# Patient Record
Sex: FEMALE | Race: OTHER | NOT HISPANIC OR LATINO | Employment: FULL TIME | URBAN - METROPOLITAN AREA
[De-identification: names, ages, dates, MRNs, and addresses within clinical notes are randomized per-mention and may not be internally consistent; named-entity substitution may affect disease eponyms.]

---

## 2023-04-20 ENCOUNTER — OFFICE VISIT (OUTPATIENT)
Dept: PRIMARY CARE | Facility: CLINIC | Age: 58
End: 2023-04-20
Payer: COMMERCIAL

## 2023-04-20 VITALS — HEART RATE: 80 BPM | SYSTOLIC BLOOD PRESSURE: 106 MMHG | DIASTOLIC BLOOD PRESSURE: 74 MMHG

## 2023-04-20 DIAGNOSIS — Z98.890 HX OF BREAST REDUCTION, ELECTIVE: ICD-10-CM

## 2023-04-20 DIAGNOSIS — L03.011 PARONYCHIA OF FINGER OF RIGHT HAND: Primary | ICD-10-CM

## 2023-04-20 DIAGNOSIS — L03.011 PARONYCHIA OF FINGER, RIGHT: ICD-10-CM

## 2023-04-20 DIAGNOSIS — K42.9 UMBILICAL HERNIA WITHOUT OBSTRUCTION AND WITHOUT GANGRENE: ICD-10-CM

## 2023-04-20 PROCEDURE — 99213 OFFICE O/P EST LOW 20 MIN: CPT | Performed by: INTERNAL MEDICINE

## 2023-04-20 RX ORDER — UBIDECARENONE 75 MG
500 CAPSULE ORAL DAILY
COMMUNITY
Start: 2017-04-28

## 2023-04-20 RX ORDER — DEXTROMETHORPHAN HYDROBROMIDE, GUAIFENESIN 5; 100 MG/5ML; MG/5ML
650 LIQUID ORAL EVERY 8 HOURS PRN
COMMUNITY
Start: 2015-04-20

## 2023-04-20 RX ORDER — AZELASTINE 1 MG/ML
2 SPRAY, METERED NASAL 2 TIMES DAILY
COMMUNITY
Start: 2020-04-02

## 2023-04-20 RX ORDER — SULFAMETHOXAZOLE AND TRIMETHOPRIM 800; 160 MG/1; MG/1
TABLET ORAL
Qty: 20 TABLET | Refills: 0 | Status: SHIPPED | OUTPATIENT
Start: 2023-04-20 | End: 2023-08-02 | Stop reason: SDUPTHER

## 2023-04-20 RX ORDER — CALCIUM CARB/VITAMIN D3/VIT K1 500-500-40
TABLET,CHEWABLE ORAL
COMMUNITY
Start: 2018-05-07

## 2023-04-20 RX ORDER — PRASTERONE 6.5 MG/1
1 INSERT VAGINAL NIGHTLY
COMMUNITY
Start: 2023-03-06 | End: 2024-03-11 | Stop reason: SDUPTHER

## 2023-04-20 RX ORDER — ESTRADIOL 0.1 MG/G
2 CREAM VAGINAL DAILY
COMMUNITY
Start: 2022-07-01 | End: 2023-04-20 | Stop reason: ALTCHOICE

## 2023-04-20 RX ORDER — FLIBANSERIN 100 MG/1
1 TABLET, FILM COATED ORAL DAILY
COMMUNITY
Start: 2023-04-10 | End: 2024-03-11 | Stop reason: SDUPTHER

## 2023-04-20 NOTE — PROGRESS NOTES
Subjective   Patient ID: Kasey Duarte is a 58 y.o. female who presents for evaluation of green fingernail    HPI   The patient reports a history of constant pain and swelling located over the lateral and medial surfaces of the nailbed of the right second finger since April 17.  She reports that she has noted greenish discoloration over the lateral surface of the nailbed.  She does report an increase in the intensity of pain with any movement of the finger and when pressure is applied to the distal end of the finger.  She reports associated feverishness.  She reports no drainage from the site.  She reports that the aforementioned symptoms developed 2 days after she had a manicure.  Review of Systems    Objective   There were no vitals taken for this visit.    Physical Exam  Skin  Greenish discoloration and swelling is noted over the lateral surface of the nailbed right second finger.  Erythema and swelling are noted over the medial surface of the nailbed of the right second finger and over the palmar surface of the distal end of the finger.  Palpation of the aforementioned regions revealed increased tenderness and a slight increase in warmth  Assessment/Plan        Assessment  Constant pain located over the medial and lateral surfaces of the nailbed of the right second finger with associated constant swelling, constant presence of erythema over the distal end of the palmar surface of the finger as well as the medial surface of the nailbed, greenish discoloration over the lateral surface of the nailbed-probably represents a bacterial paronychia.  Plan  I have urged the patient to soak the finger in warm water 10 minutes at a time 3 times per day.  I also started the patient on Bactrim DS 1 tablet p.o. twice daily x10 days.  The patient will call me in 5 days with her condition or sooner if symptoms worsen or if she is unable to tolerate the Bactrim DS

## 2023-06-14 ENCOUNTER — HOSPITAL ENCOUNTER (OUTPATIENT)
Dept: DATA CONVERSION | Facility: HOSPITAL | Age: 58
End: 2023-06-14
Attending: SURGERY | Admitting: SURGERY
Payer: COMMERCIAL

## 2023-06-14 DIAGNOSIS — R10.33 PERIUMBILICAL PAIN: ICD-10-CM

## 2023-06-14 DIAGNOSIS — D12.5 BENIGN NEOPLASM OF SIGMOID COLON: ICD-10-CM

## 2023-06-14 DIAGNOSIS — Z86.010 PERSONAL HISTORY OF COLONIC POLYPS: ICD-10-CM

## 2023-06-14 DIAGNOSIS — Z12.11 ENCOUNTER FOR SCREENING FOR MALIGNANT NEOPLASM OF COLON: ICD-10-CM

## 2023-06-14 DIAGNOSIS — Z88.0 ALLERGY STATUS TO PENICILLIN: ICD-10-CM

## 2023-06-22 LAB
COMPLETE PATHOLOGY REPORT: NORMAL
CONVERTED CLINICAL DIAGNOSIS-HISTORY: NORMAL
CONVERTED FINAL DIAGNOSIS: NORMAL
CONVERTED FINAL REPORT PDF LINK TO COPY AND PASTE: NORMAL
CONVERTED GROSS DESCRIPTION: NORMAL

## 2023-08-02 ENCOUNTER — TELEPHONE (OUTPATIENT)
Dept: PRIMARY CARE | Facility: CLINIC | Age: 58
End: 2023-08-02

## 2023-08-02 DIAGNOSIS — L03.011 PARONYCHIA OF FINGER, RIGHT: ICD-10-CM

## 2023-08-02 RX ORDER — SULFAMETHOXAZOLE AND TRIMETHOPRIM 800; 160 MG/1; MG/1
TABLET ORAL
Qty: 20 TABLET | Refills: 0 | Status: SHIPPED | OUTPATIENT
Start: 2023-08-02 | End: 2023-10-07 | Stop reason: ALTCHOICE

## 2023-08-02 NOTE — PROGRESS NOTES
Patient called to report a history of urine urinary urgency and urinary frequency over the past few weeks increasing within the past few days.  She reports passage of cloudy strong odored urine since yesterday.  She reports a history of constant low-grade lower back pain since yesterday.  She reports a history of malaise today..  The patient reports no fever, chills, abdominal pain, nausea, vomiting, dysuria, hematuria.  I would wonder whether the patient has a complicated urinary tract infection.  I have prescribed Bactrim DS 1 tablet p.o. twice daily x10 days.  The patient will call me in 72 hours with her condition or sooner if symptoms worsen

## 2023-09-07 VITALS — WEIGHT: 173.28 LBS | BODY MASS INDEX: 32.72 KG/M2 | HEIGHT: 61 IN

## 2023-10-02 ENCOUNTER — APPOINTMENT (OUTPATIENT)
Dept: RADIOLOGY | Facility: CLINIC | Age: 58
End: 2023-10-02
Payer: COMMERCIAL

## 2023-10-03 PROBLEM — H52.13 MYOPIA OF BOTH EYES WITH ASTIGMATISM AND PRESBYOPIA: Status: ACTIVE | Noted: 2023-10-03

## 2023-10-03 PROBLEM — N95.2 VAGINAL ATROPHY: Status: ACTIVE | Noted: 2023-10-03

## 2023-10-03 PROBLEM — R30.0 DYSURIA: Status: ACTIVE | Noted: 2023-10-03

## 2023-10-03 PROBLEM — N12 PYELONEPHRITIS: Status: ACTIVE | Noted: 2023-10-03

## 2023-10-03 PROBLEM — J34.89 RHINORRHEA: Status: ACTIVE | Noted: 2023-10-03

## 2023-10-03 PROBLEM — F52.0 HYPOACTIVE SEXUAL DESIRE DISORDER: Status: ACTIVE | Noted: 2023-10-03

## 2023-10-03 PROBLEM — K46.9 ABDOMINAL HERNIA: Status: ACTIVE | Noted: 2023-10-03

## 2023-10-03 PROBLEM — H61.23 BILATERAL IMPACTED CERUMEN: Status: ACTIVE | Noted: 2023-10-03

## 2023-10-03 PROBLEM — F52.31 FEMALE ORGASMIC DISORDER: Status: ACTIVE | Noted: 2023-10-03

## 2023-10-03 PROBLEM — J34.3 NASAL TURBINATE HYPERTROPHY: Status: ACTIVE | Noted: 2023-10-03

## 2023-10-03 PROBLEM — H52.4 MYOPIA OF BOTH EYES WITH ASTIGMATISM AND PRESBYOPIA: Status: ACTIVE | Noted: 2023-10-03

## 2023-10-03 PROBLEM — E55.9 VITAMIN D DEFICIENCY: Status: ACTIVE | Noted: 2023-10-03

## 2023-10-03 PROBLEM — H52.203 MYOPIA OF BOTH EYES WITH ASTIGMATISM AND PRESBYOPIA: Status: ACTIVE | Noted: 2023-10-03

## 2023-10-03 PROBLEM — Z90.711 HISTORY OF HYSTERECTOMY, SUPRACERVICAL: Status: ACTIVE | Noted: 2023-10-03

## 2023-10-03 PROBLEM — J31.0 CHRONIC RHINITIS: Status: ACTIVE | Noted: 2023-10-03

## 2023-10-03 PROBLEM — R68.82 DECREASED SEX DRIVE: Status: ACTIVE | Noted: 2023-10-03

## 2023-10-03 RX ORDER — CITRULL/ARGIN/PINE XT/ROSE HIP 400-400 MG
1 TABLET ORAL DAILY
COMMUNITY
Start: 2022-09-29

## 2023-10-03 RX ORDER — BIOTIN 5 MG
TABLET ORAL
COMMUNITY
Start: 2023-08-31

## 2023-10-03 RX ORDER — CALCIUM/MAGNESIUM/ZINC 333-133-5
1 TABLET ORAL DAILY
COMMUNITY
Start: 2022-09-29

## 2023-10-03 RX ORDER — METRONIDAZOLE 7.5 MG/G
1 GEL VAGINAL NIGHTLY
COMMUNITY
Start: 2022-06-29 | End: 2024-02-23 | Stop reason: ALTCHOICE

## 2023-10-04 ENCOUNTER — HOSPITAL ENCOUNTER (OUTPATIENT)
Dept: RADIOLOGY | Facility: HOSPITAL | Age: 58
Discharge: HOME | End: 2023-10-04
Payer: COMMERCIAL

## 2023-10-04 DIAGNOSIS — Z90.711 ACQUIRED ABSENCE OF UTERUS WITH REMAINING CERVICAL STUMP: ICD-10-CM

## 2023-10-04 PROCEDURE — 76830 TRANSVAGINAL US NON-OB: CPT

## 2023-10-05 ENCOUNTER — APPOINTMENT (OUTPATIENT)
Dept: PHYSICAL THERAPY | Facility: CLINIC | Age: 58
End: 2023-10-05
Payer: COMMERCIAL

## 2023-10-05 ENCOUNTER — OFFICE VISIT (OUTPATIENT)
Dept: OBSTETRICS AND GYNECOLOGY | Facility: CLINIC | Age: 58
End: 2023-10-05
Payer: COMMERCIAL

## 2023-10-05 VITALS
BODY MASS INDEX: 35.08 KG/M2 | DIASTOLIC BLOOD PRESSURE: 68 MMHG | HEART RATE: 88 BPM | SYSTOLIC BLOOD PRESSURE: 106 MMHG | WEIGHT: 179.6 LBS

## 2023-10-05 DIAGNOSIS — N95.0 POST-MENOPAUSAL BLEEDING: ICD-10-CM

## 2023-10-05 DIAGNOSIS — Z90.711 STATUS POST ABDOMINAL SUPRACERVICAL SUBTOTAL HYSTERECTOMY: ICD-10-CM

## 2023-10-05 DIAGNOSIS — N89.8 VAGINAL DISCHARGE: ICD-10-CM

## 2023-10-05 DIAGNOSIS — R30.0 DYSURIA: Primary | ICD-10-CM

## 2023-10-05 PROCEDURE — 87186 SC STD MICRODIL/AGAR DIL: CPT | Performed by: STUDENT IN AN ORGANIZED HEALTH CARE EDUCATION/TRAINING PROGRAM

## 2023-10-05 PROCEDURE — 99212 OFFICE O/P EST SF 10 MIN: CPT | Performed by: STUDENT IN AN ORGANIZED HEALTH CARE EDUCATION/TRAINING PROGRAM

## 2023-10-05 ASSESSMENT — PAIN SCALES - GENERAL: PAINLEVEL: 0-NO PAIN

## 2023-10-05 NOTE — PROGRESS NOTES
Division of Minimally Invasive Gynecologic Surgery  [facility]    10/05/23 Gynecology Visit    CC: Watery discharge, light pink spotting      Kasey Duarte is a 58 y.o.  who presents in follow up for watery discharge w/ intermittent light pink vaginal bleeding.     Discharge has come back, but is not as productive as it was previously. She does occasionally have light pink vaginal spotting. Though less heavy, it is very bothersome to her as sometimes it goes through her underwear and onto her clothes.     She still strongly wishes to prefer surgery. Continues to deny pelvic/abdominal pain.     Also reports UTI x 2 in the past 6 months and thinks she may currently have another.     Recent pelvic US did not show any evidence of cervical mass/fluid collection.     From last note:   57 yo s/p supracervical hyst w/ PMB and intermittent but persistent watery discharge presents in referral from Yenny Lau to discuss management options.     She reports both bleeding and discharge are intermittent and typical light, but bleeding can sometimes be like a light period. She is currently using vaginal estrogen which seems to have improved bleeding for the past 3 weeks. She reports discharge improved after antibiotic treatment for UTI, but is beginning to return. Minimal currently and no vagina irritation, pain, or itching.     She would prefer to avoid further surgery if possible, but is very bothered by these symptoms. She is concerned about sexual function w/ removal of cervix.     Last pap: 2/2023 cytology negative/HRHPV + (other), denies hx of abnormal pap; no GC/CT/trich on pap   Mammogram up to date   Colnosocopy 2023, f/u in 5 years   PMHx: none   PSHx: ?open supracervical hyst w/ [place] 2013, lap gastric sleeve 2015, umbilical hernia repair w/ mesh    PMHx, PSHx, SHx, Allergies, and Medications updated in Epic.    ROS: reviewed and negative    PE:/68   Pulse 88   Wt 81.5 kg  (179 lb 9.6 oz)   BMI 35.08 kg/m²   Constitutional:  No acute distress, well-nourished and well-developed  HEENT: EOM grossly intact, MMM, neck supple and with full ROM  Pulm:  Effort normal. No accessory muscle usage.  No respiratory distress.  Neurological:  She is alert and oriented to person place and time.  Skin: Warm, no pallor.  Psychiatric:  She has normal mood and affect.    A/P:    Problem List Items Addressed This Visit          Genitourinary and Reproductive    Dysuria - Primary    Relevant Orders    Urine Culture     Other Visit Diagnoses       Post-menopausal bleeding        Vaginal discharge        Status post abdominal supracervical subtotal hysterectomy              Kasey Duarte is a 58 y.o.  who presents in follow up for watery discharge w/ intermittent light pink vaginal bleeding.     Plan:  - Plan for repeat testing of GC/CT/trich. Negative on pap 2/2023, but given persistence and now bleeding, re-testing is reasonable.  - Trial of boric acid suppositories   - UTI: UA in office today negative, will send for culture    Yanira Machuca MD  Division of Minimally Invasive Gynecologic Surgery  [facility]

## 2023-10-06 ENCOUNTER — TELEPHONE (OUTPATIENT)
Dept: OBSTETRICS AND GYNECOLOGY | Facility: CLINIC | Age: 58
End: 2023-10-06
Payer: COMMERCIAL

## 2023-10-06 ENCOUNTER — LAB (OUTPATIENT)
Dept: LAB | Facility: LAB | Age: 58
End: 2023-10-06
Payer: COMMERCIAL

## 2023-10-06 DIAGNOSIS — N93.9 VAGINAL BLEEDING: ICD-10-CM

## 2023-10-06 DIAGNOSIS — N93.9 VAGINAL BLEEDING: Primary | ICD-10-CM

## 2023-10-06 PROCEDURE — 87800 DETECT AGNT MULT DNA DIREC: CPT

## 2023-10-06 PROCEDURE — 87661 TRICHOMONAS VAGINALIS AMPLIF: CPT

## 2023-10-06 NOTE — TELEPHONE ENCOUNTER
Called the patient and let her know that she needs to give a urine sample at any  lab for GC/CT and trich. Verbalized understanding.

## 2023-10-06 NOTE — TELEPHONE ENCOUNTER
Please let Kasey know that Dr. Machuca had me order GC/CT and Trich. She can do it at any  lab. Yenny Lau, APRN-CNP

## 2023-10-07 DIAGNOSIS — N30.00 ACUTE CYSTITIS WITHOUT HEMATURIA: Primary | ICD-10-CM

## 2023-10-07 LAB
C TRACH RRNA SPEC QL NAA+PROBE: NEGATIVE
N GONORRHOEA DNA SPEC QL PROBE+SIG AMP: NEGATIVE
T VAGINALIS RRNA SPEC QL NAA+PROBE: NEGATIVE

## 2023-10-07 RX ORDER — SULFAMETHOXAZOLE AND TRIMETHOPRIM 800; 160 MG/1; MG/1
1 TABLET ORAL 2 TIMES DAILY
Qty: 6 TABLET | Refills: 0 | Status: SHIPPED | OUTPATIENT
Start: 2023-10-07 | End: 2023-10-10

## 2023-10-08 LAB — BACTERIA UR CULT: ABNORMAL

## 2023-10-12 ENCOUNTER — LAB (OUTPATIENT)
Dept: LAB | Facility: LAB | Age: 58
End: 2023-10-12
Payer: COMMERCIAL

## 2023-10-12 ENCOUNTER — TREATMENT (OUTPATIENT)
Dept: PHYSICAL THERAPY | Facility: CLINIC | Age: 58
End: 2023-10-12
Payer: COMMERCIAL

## 2023-10-12 DIAGNOSIS — R30.0 DYSURIA: ICD-10-CM

## 2023-10-12 DIAGNOSIS — M54.9 BACK PAIN, UNSPECIFIED BACK LOCATION, UNSPECIFIED BACK PAIN LATERALITY, UNSPECIFIED CHRONICITY: Primary | ICD-10-CM

## 2023-10-12 DIAGNOSIS — R30.0 DYSURIA: Primary | ICD-10-CM

## 2023-10-12 LAB
APPEARANCE UR: ABNORMAL
BACTERIA #/AREA URNS AUTO: ABNORMAL /HPF
BILIRUB UR STRIP.AUTO-MCNC: NEGATIVE MG/DL
COLOR UR: YELLOW
GLUCOSE UR STRIP.AUTO-MCNC: NEGATIVE MG/DL
KETONES UR STRIP.AUTO-MCNC: NEGATIVE MG/DL
LEUKOCYTE ESTERASE UR QL STRIP.AUTO: ABNORMAL
MUCOUS THREADS #/AREA URNS AUTO: ABNORMAL /LPF
NITRITE UR QL STRIP.AUTO: NEGATIVE
PH UR STRIP.AUTO: 6 [PH]
PROT UR STRIP.AUTO-MCNC: NEGATIVE MG/DL
RBC # UR STRIP.AUTO: ABNORMAL /UL
RBC #/AREA URNS AUTO: ABNORMAL /HPF
SP GR UR STRIP.AUTO: 1.02
SQUAMOUS #/AREA URNS AUTO: ABNORMAL /HPF
UROBILINOGEN UR STRIP.AUTO-MCNC: <2 MG/DL
WBC #/AREA URNS AUTO: ABNORMAL /HPF

## 2023-10-12 PROCEDURE — 87086 URINE CULTURE/COLONY COUNT: CPT

## 2023-10-12 PROCEDURE — 81001 URINALYSIS AUTO W/SCOPE: CPT

## 2023-10-12 PROCEDURE — 97110 THERAPEUTIC EXERCISES: CPT | Mod: GP | Performed by: PHYSICAL MEDICINE & REHABILITATION

## 2023-10-12 ASSESSMENT — PAIN - FUNCTIONAL ASSESSMENT: PAIN_FUNCTIONAL_ASSESSMENT: 0-10

## 2023-10-12 ASSESSMENT — PAIN SCALES - GENERAL: PAINLEVEL_OUTOF10: 6

## 2023-10-12 NOTE — PROGRESS NOTES
"  Physical Therapy Treatment    Patient Name: Kasey Duarte  MRN: [id number]  Today's Date: 10/12/2023  Time Calculation  Start Time: 1523  Stop Time: 1605  Time Calculation (min): 42 min  Current Problem  1. Back pain, unspecified back location, unspecified back pain laterality, unspecified chronicity            Insurance:  Visit number: 8 of 60      Subjective   General  General Comment: Patient reports having slightly higher pain levels this visit. She attributes this to the weather outside.      Precautions  Precautions  Precautions Comment: None  Pain  Pain Assessment: 0-10  Pain Score: 6    Objective   Treatments:  Therapeutic Exercise  Therapeutic Exercise Activity 1: SciFit: L3x5'  Therapeutic Exercise Activity 2: Supine Shoulder flexion: x20 w dowel  Therapeutic Exercise Activity 3: Open Book Stretch: 10x10\" holds each side  Therapeutic Exercise Activity 4: Seated thoracic Ext: x20 against 1/2 foam roll  Therapeutic Exercise Activity 5: Pec Stretch: x1' low-mid  Therapeutic Exercise Activity 6: Lumbar rotations: x20 each side w legs on red ball  Therapeutic Exercise Activity 7: DKTC: x20 w legs on red ball  Therapeutic Exercise Activity 8: Seated Row: x20 w 10# at Matrix  Therapeutic Exercise Activity 9: Shoulder Ext: x20 w 10# at Matrix    Assessment:   Today's session focused on incorporating thoracic mobility activities to address patient's symptoms. Patient reported a slight increased in pain at the conclusion of the session. She was instructed to rest for the remainder of the day and monitor her response until her next visit.     Plan:   Continue with current POC. Progress strengthening, mobility and stability as tolerated.    Jomar Christianson, PT  "

## 2023-10-13 ENCOUNTER — TELEPHONE (OUTPATIENT)
Dept: OBSTETRICS AND GYNECOLOGY | Facility: CLINIC | Age: 58
End: 2023-10-13
Payer: COMMERCIAL

## 2023-10-13 LAB — BACTERIA UR CULT: NORMAL

## 2023-10-19 ENCOUNTER — TREATMENT (OUTPATIENT)
Dept: PHYSICAL THERAPY | Facility: CLINIC | Age: 58
End: 2023-10-19
Payer: COMMERCIAL

## 2023-10-19 DIAGNOSIS — M54.9 BACK PAIN, UNSPECIFIED BACK LOCATION, UNSPECIFIED BACK PAIN LATERALITY, UNSPECIFIED CHRONICITY: Primary | ICD-10-CM

## 2023-10-19 PROCEDURE — 97110 THERAPEUTIC EXERCISES: CPT | Mod: GP,CQ

## 2023-10-19 ASSESSMENT — PAIN SCALES - GENERAL: PAINLEVEL_OUTOF10: 4

## 2023-10-19 ASSESSMENT — PAIN - FUNCTIONAL ASSESSMENT: PAIN_FUNCTIONAL_ASSESSMENT: 0-10

## 2023-10-19 NOTE — PROGRESS NOTES
"  Physical Therapy Treatment    Patient Name: Kasey Duarte  MRN: [id number]  Today's Date: 10/19/2023  Time Calculation  Start Time: 1657  Stop Time: 1732  Time Calculation (min): 35 min  Current Problem  1. Back pain, unspecified back location, unspecified back pain laterality, unspecified chronicity            Insurance:  Number of Treatments Authorized: 9/60          Subjective   General  General Comment: PT STATES SHE IS DOING OK.  SHE KNOWS IT'S GOING TO RAIN.  NEW HEP IS GOING WELL. PT LATE    Performing HEP?: Yes    Precautions  Precautions  Precautions Comment: None  Pain  Pain Assessment: 0-10  Pain Score: 4    Objective   FWD POSTURE          Treatments:    Therapeutic Exercise  Therapeutic Exercise Activity 1: [place] L2 X 5 MIN  Therapeutic Exercise Activity 2: GASTROC STRETCH X 1 MIN  Therapeutic Exercise Activity 3: DYNAMICS  BUTT KICK, MARCH  Therapeutic Exercise Activity 4: Seated thoracic Ext: x 2 MIN against 1/2 foam roll  Therapeutic Exercise Activity 5: Pec Stretch: x1' low-mid  Therapeutic Exercise Activity 6: Lumbar rotations: x20 each side w legs on red ball  Therapeutic Exercise Activity 7: DKTC: x20 w legs on red ball  Therapeutic Exercise Activity 8: LIGHT BAND SH EXT AND ROWS X 1 MIN EACH  Therapeutic Exercise Activity 9: Open Book Stretch: HOLD 10\" X 1 MIN each side         Manual Therapy  Manual Therapy Performed: Yes  Manual Therapy Activity 1: SEATED LEANING ON TABLE GREEN ROLLER STM C-T-L PARA, RHOM                   Assessment:  PT Assessment  Assessment Comment: PT BETI EX'S WELL.  SHE IS PROGRESSING WITH HER FLEXIBILITY IN HER SPINE AND SHOULDERS. SHE WAS CHALLENGED WITH HER BALANCE DURING DYNAMICS. PT FELT BETTER AFTER SESSION.    Plan:  OP PT Plan  PT Plan: Skilled PT (Continue with current POC. Progress strengthening, mobility and stability as tolerated.)  Number of Treatments Authorized: 9/60    Goals:       Alexandre Toledo PTA  "

## 2023-10-30 ENCOUNTER — APPOINTMENT (OUTPATIENT)
Dept: PHYSICAL THERAPY | Facility: CLINIC | Age: 58
End: 2023-10-30
Payer: COMMERCIAL

## 2023-11-27 ENCOUNTER — APPOINTMENT (OUTPATIENT)
Dept: PLASTIC SURGERY | Facility: CLINIC | Age: 58
End: 2023-11-27
Payer: COMMERCIAL

## 2023-12-11 ENCOUNTER — OFFICE VISIT (OUTPATIENT)
Dept: PLASTIC SURGERY | Facility: CLINIC | Age: 58
End: 2023-12-11
Payer: COMMERCIAL

## 2023-12-11 ENCOUNTER — LAB (OUTPATIENT)
Dept: LAB | Facility: LAB | Age: 58
End: 2023-12-11
Payer: COMMERCIAL

## 2023-12-11 VITALS
HEIGHT: 60 IN | DIASTOLIC BLOOD PRESSURE: 66 MMHG | SYSTOLIC BLOOD PRESSURE: 98 MMHG | BODY MASS INDEX: 35.14 KG/M2 | HEART RATE: 59 BPM | WEIGHT: 179 LBS

## 2023-12-11 DIAGNOSIS — N30.00 ACUTE CYSTITIS WITHOUT HEMATURIA: Primary | ICD-10-CM

## 2023-12-11 DIAGNOSIS — N30.00 ACUTE CYSTITIS WITHOUT HEMATURIA: ICD-10-CM

## 2023-12-11 DIAGNOSIS — M54.9 BACK PAIN, UNSPECIFIED BACK LOCATION, UNSPECIFIED BACK PAIN LATERALITY, UNSPECIFIED CHRONICITY: Primary | ICD-10-CM

## 2023-12-11 PROCEDURE — 81001 URINALYSIS AUTO W/SCOPE: CPT

## 2023-12-11 PROCEDURE — 87086 URINE CULTURE/COLONY COUNT: CPT

## 2023-12-11 PROCEDURE — 99214 OFFICE O/P EST MOD 30 MIN: CPT | Performed by: PHYSICIAN ASSISTANT

## 2023-12-11 NOTE — PROGRESS NOTES
Department of Plastic and Reconstructive Surgery            Follow Up Visit    Date: 12/11/23    Subjective   Kasey Duarte is a 58 y.o. female who presents for follow up visit regarding mid to upper back pain secondary to macromastia.     She presents after the completion of physical therapy. She endorsed minimal improvement in her symptoms with physical therapy. She states that she is still having daily pain. She was last seen by Dr. Gonzalez on 7/31/23. She has a history of gastric sleeve bariatric surgery and endorsed that since her weight loss she noticed increased size and drooping of her breasts. She endorses bra strap grooving. She endorses having a difficult time finding bras that fit her due to the increased size of her breasts. She was being treated by her PCP with steroids for the rashes she was having under her breast folds. She presents now to discuss possible breast reduction after minimal improvement with physical therapy.     Objective   Vital Signs:   Vitals:    12/11/23 1422   BP: 98/66   Pulse: 59     Gen: interactive and pleasant  Head: NCAT  Eyes: EOMI, PERRLA  Mouth: MMM  Throat: trachea midline  Cor: RRR  Pulm: nonlabored breathing  Abd: s/nt/nd  Neuro: AAOx3  Ext: extremities perfused    Focused exam of the:                                 R                  L  SN:NAC             29cm              30cm  NAC:IMF            10cm             11cm         BW                    14cm              14cm        NAC diam         5cm              5cm                                                                                                                                  Ptosis Grade     3               3                                                       Bra Size         36DDD       Assessment/Plan     Kasey Duarte is a 58 y.o. female who presents for follow up visit regarding mid to upper back pain secondary to macromastia.     Today we discussed moving forward with  surgical intervention for continued symptomatic macromastia. I educated the patient on the Schnur scale as a use of measurement to determine to amount of breast tissue to take based on body surface area. Her BSA is 1.85. The schnur scale places her at the required amount per side of 482g.     We performed Vectra analysis of this patient and found to have estimated breast volumes of 613g right breast and 543g left breast. I discussed with her that she would likely not be a good candidate for breast reduction surgery due to the volumes of the breast. I advised that due to having to remove 482g from the left breast she would be left overall very small and this would likely cause cosmetic dissatisfaction. I advised that due to the ptotic nature of the breast from weight loss surgery mastopexy may help her to achieve the aesthetic result she is looking for while reducing the strain on the shoulders and back by reducing the ptosis of the breast.  She would like to consider this, our office will provide a quote to her for mastopexy. She additionally noted a history of keloid scarring, I advised that she will be susceptible to keloid scars with any surgical scar.     Plan:   Mastopexy quote  Patient to contact our office with her decision on how she would like to proceed.   3D vectra today    I spent 30 minutes with this patient. Greater than 50% of this time was spent in the counselling and/or coordination of care of this patient.  This note was created using voice recognition software and was not corrected for typographical or grammatical errors.    Signature: Raquel Guido PA-C  Date: 12/11/23

## 2023-12-12 DIAGNOSIS — R30.0 DYSURIA: Primary | ICD-10-CM

## 2023-12-12 LAB
APPEARANCE UR: ABNORMAL
BACTERIA #/AREA URNS AUTO: ABNORMAL /HPF
BACTERIA UR CULT: NORMAL
BILIRUB UR STRIP.AUTO-MCNC: NEGATIVE MG/DL
COLOR UR: YELLOW
GLUCOSE UR STRIP.AUTO-MCNC: NEGATIVE MG/DL
HOLD SPECIMEN: NORMAL
KETONES UR STRIP.AUTO-MCNC: NEGATIVE MG/DL
LEUKOCYTE ESTERASE UR QL STRIP.AUTO: NEGATIVE
NITRITE UR QL STRIP.AUTO: NEGATIVE
PH UR STRIP.AUTO: 5 [PH]
PROT UR STRIP.AUTO-MCNC: NEGATIVE MG/DL
RBC # UR STRIP.AUTO: ABNORMAL /UL
RBC #/AREA URNS AUTO: >20 /HPF
SP GR UR STRIP.AUTO: 1.01
SQUAMOUS #/AREA URNS AUTO: ABNORMAL /HPF
UROBILINOGEN UR STRIP.AUTO-MCNC: <2 MG/DL
WBC #/AREA URNS AUTO: >50 /HPF

## 2023-12-12 RX ORDER — NITROFURANTOIN 25; 75 MG/1; MG/1
100 CAPSULE ORAL 2 TIMES DAILY
Qty: 10 CAPSULE | Refills: 0 | Status: SHIPPED | OUTPATIENT
Start: 2023-12-12 | End: 2023-12-17

## 2024-01-17 DIAGNOSIS — R30.0 DYSURIA: Primary | ICD-10-CM

## 2024-02-06 ENCOUNTER — OFFICE VISIT (OUTPATIENT)
Dept: OBSTETRICS AND GYNECOLOGY | Facility: CLINIC | Age: 59
End: 2024-02-06
Payer: COMMERCIAL

## 2024-02-06 VITALS
BODY MASS INDEX: 35.5 KG/M2 | HEART RATE: 74 BPM | SYSTOLIC BLOOD PRESSURE: 130 MMHG | DIASTOLIC BLOOD PRESSURE: 84 MMHG | HEIGHT: 61 IN | WEIGHT: 188 LBS

## 2024-02-06 DIAGNOSIS — N89.8 VAGINAL ODOR: ICD-10-CM

## 2024-02-06 DIAGNOSIS — T83.712A: Primary | ICD-10-CM

## 2024-02-06 DIAGNOSIS — R30.0 DYSURIA: ICD-10-CM

## 2024-02-06 PROCEDURE — 1036F TOBACCO NON-USER: CPT | Performed by: NURSE PRACTITIONER

## 2024-02-06 PROCEDURE — 99213 OFFICE O/P EST LOW 20 MIN: CPT | Performed by: NURSE PRACTITIONER

## 2024-02-06 RX ORDER — METRONIDAZOLE 500 MG/1
500 TABLET ORAL
Qty: 14 TABLET | Refills: 0 | Status: SHIPPED | OUTPATIENT
Start: 2024-02-06 | End: 2024-02-13

## 2024-02-06 ASSESSMENT — PATIENT HEALTH QUESTIONNAIRE - PHQ9
1. LITTLE INTEREST OR PLEASURE IN DOING THINGS: NOT AT ALL
2. FEELING DOWN, DEPRESSED OR HOPELESS: NOT AT ALL
SUM OF ALL RESPONSES TO PHQ9 QUESTIONS 1 AND 2: 0

## 2024-02-06 ASSESSMENT — PAIN SCALES - GENERAL: PAINLEVEL: 0-NO PAIN

## 2024-02-06 NOTE — PROGRESS NOTES
[facility] Department of Urogynecology   QUINTON Sanchez  [phone]    ASSESSMENT AND PLAN:   58 y.o. female being assessed for sling erosion and related symptoms including foul smelling vaginal discharge and vaginal bleeding.     Diagnoses:   #1 Sling erosion  #2 Vaginal bleeding from sling erosion     Plan:   1. Sling erosion, vaginal odor, vaginal bleeding  - S/p combined surgical procedure in May 2013, including a supracervical hysterectomy and a mid-urethral sling for stress incontinence with Dr. Tanner and Dr. Lei.   - On exam, palpation of the anterior vaginal wall revealed left sling arm erosion through the vaginal rugae but was unable to visualize sling erosion because of the blood even though a cotton swab was used to remove the blood.   - She is having a foul smelling, bloody discharge due to the sling erosion.   - We reviewed the first line treatment for sling erosion is vaginal estrogen cream. For now, she will continue with Intrarosa which we discussed is DHEA. She has been on Estradiol before for atrophy, but discontinued and only uses Intrarosa now as she felt this worked better for her.   - To rule out mesh erosion into the bladder, a cystoscopy can also be done. If she did have mesh in the bladder, it can cause Nina or urinary leakage.   - She will be treated with Flagyl for her vaginal odor until she can be seen with Dr. Tucker.      Follow-up with Dr. Tucker for cystoscopy and further discussion of sling erosion.     Scribe Attestation:   INevaeh, am scribing for virtually, and in the presence of QUINTON Sanchez on 2/6/24 at 4:16 PM.   IZoila APRN-CNP, personally performed the services described in this documentation which was scribed virtually and I confirm that it is both accurate and complete.       Problem List Items Addressed This Visit          Genitourinary and Reproductive    Dysuria    Relevant Orders    Measure post void  "residual     Other Visit Diagnoses       Vaginal odor    -  Primary    Relevant Medications    metroNIDAZOLE (FlagyL) 500 mg tablet              [facility]    HISTORY OF PRESENT ILLNESS:     Kasey Duarte is a 58 y.o. female who presents for dysuria. Dr. Machuca is her GYN.     Record Review:   - On 12/11/23, another urine cx was sent because UA micro had >20 RBC/HPF, but culture was no growth.   - On 10/12/23, she had a no growth urine cx but had moderate blood on UA. Of note, RBC/HPF were 6-10 on UA micro.   - 10/4/23 Pelvic ultrasound: Uterus and ovaries are not visualized. No clear evidence for mass or fluid collection.   - 6/10/23 CT abdomen pelvis: Approximate 3.1 cm cyst in the lower pole of the left kidney. 4 mm left renal stone redemonstrated. No hydronephrosis or hydroureter. The bladder wall is normal thickness for degree of distention.  - S/p sleeve gastrectomy.   - S/p combined surgical procedure in May 2013, including a supracervical hysterectomy and a mid-urethral sling for stress incontinence with Dr. Tanner and Dr. Lei.      Sling History:    - In 2019, she began having light-colored, watery discharge which has progressively worsened to the point of requiring a full pad change midday and has developed an \"old urine smell\" over the last four to six months.    - Since 2016, she has been having Nina.   - Most recently she noticed the sxs stop if she is put on an antibiotic x10 days. The bleeding temporarily stops but then comes back gradually.    - Never had a cystoscopy.     OBGYN Hx:  - She has been using Intrarosa for vaginal atrophy. Previously used Estradiol, but she felt like Intrarosa worked better. Uses Intrarosa nightly.     Sexual Activity:   - She is sexually active and bleeds during intercourse. Her  did say he felt something in the vagina. Her new partner has not felt anything in the vagina.   - Uses lubricant during intercourse.     Past Medical History:   "     Past Medical History:   Diagnosis Date    Arthropathy, unspecified 2018    Arthropathy    Hypoactive sexual desire disorder 2020    Hypoactive sexual desire disorder    Postmenopausal atrophic vaginitis 2020    Vaginal atrophy    Status post abdominal supracervical subtotal hysterectomy           Past Surgical History:     - sleeve gastrectomy in 2015, supracervical hysterectomy and a mid-urethral sling for stress incontinence with Dr. Tanner and Dr. Lei in , umbilical hernia repair with mesh     Past Surgical History:   Procedure Laterality Date     SECTION, CLASSIC  2014     Section    HYSTERECTOMY  2014    Open supracervical hyst w/ Dr. Tanner     OTHER SURGICAL HISTORY  2020    Gastric bypass surgery    OTHER SURGICAL HISTORY  2014    Arthrotomy Of Knee    OTHER SURGICAL HISTORY  2014    Laparoscopic Sling Operation For Stress Incontinence    OTHER SURGICAL HISTORY  2014    Foot Surgery Left    OTHER SURGICAL HISTORY  2014    Ovarian Cystectomy Right    TONSILLECTOMY  2014    Tonsillectomy    UMBILICAL HERNIA REPAIR  2014    with mesh         Medications:       Prior to Admission medications    Medication Sig Start Date End Date Taking? Authorizing Provider   acetaminophen (Tylenol Arthritis Pain) 650 mg ER tablet Take 1 tablet (650 mg) by mouth every 8 hours if needed. 4/20/15  Yes Historical Provider, MD   Addyi 100 mg tablet 1 tablet once daily. 4/10/23  Yes Historical Provider, MD   azelastine (Astelin) 137 mcg (0.1 %) nasal spray Administer 2 sprays into affected nostril(s) twice a day. 20  Yes Historical Provider, MD   biotin 5 mg tablet Take by mouth. 23  Yes Historical Provider, MD   calcium-magnesium-zinc 333-133-5 mg tablet Take 1 tablet by mouth once daily. 22  Yes Historical Provider, MD   cyanocobalamin (Vitamin B-12) 500 mcg tablet Take 1 tablet (500 mcg) by mouth once daily. 17   "Yes Historical Provider, MD   Intrarosa 6.5 mg insert Insert 1 Application into the vagina once daily at bedtime. 3/6/23  Yes Historical Provider, MD   multivit-min-ferrous fumarate (Multi Vitamin) 9 mg iron/15 mL liquid Take by mouth. 5/7/18  Yes Historical Provider, MD   citrull-argin-pine xt-hellen hip (Ristela) 472-435- mg tablet Take 1 tablet by mouth once daily. 9/29/22   Historical Provider, MD   metroNIDAZOLE (FlagyL) 500 mg tablet Take 1 tablet (500 mg) by mouth 2 times a day with meals for 7 days. 2/6/24 2/13/24  Zoila Maloney, APRN-CNP   metroNIDAZOLE (Metrogel) 0.75 % (37.5mg/5 gram) vaginal gel Insert 1 Application into the vagina once daily at bedtime. 6/29/22   Historical Provider, MD HERNANDEZ  Review of Systems       PHYSICAL EXAM:      /84   Pulse 74   Ht 1.549 m (5' 1\")   Wt 85.3 kg (188 lb)   BMI 35.52 kg/m²      No LMP recorded.      Well developed, well nourished, in no apparent distress.   Neurologic/Psychiatric:  Awake, Alert and Oriented times 3.  Affect normal.     GENITAL/URINARY:       External Genitalia:  The patient has normal appearing external genitalia, normal skenes and bartholins glands, and a normal hair distribution.  Her vulva is without lesions, erythema or discharge.  It is non-tender with appropriate sensation.     Urethral Meatus:  Size normal, Location normal, Lesions absent, Prolapse absent,      Urethra:  Fullness absent, Masses absent,      Vagina:  General appearance normal, palpation of the anterior vaginal wall revealed left sling arm erosion through the vaginal rugae, unable to visualize sling erosion because of the blood even though a cotton swab was used to remove the blood    Cervix: Normal, no discharge.   Uterus:  surgically absent    Normal Perineum    "

## 2024-02-22 ENCOUNTER — APPOINTMENT (OUTPATIENT)
Dept: OBSTETRICS AND GYNECOLOGY | Facility: CLINIC | Age: 59
End: 2024-02-22
Payer: COMMERCIAL

## 2024-02-22 ENCOUNTER — PREP FOR PROCEDURE (OUTPATIENT)
Dept: OBSTETRICS AND GYNECOLOGY | Facility: CLINIC | Age: 59
End: 2024-02-22

## 2024-02-22 ENCOUNTER — PROCEDURE VISIT (OUTPATIENT)
Dept: OBSTETRICS AND GYNECOLOGY | Facility: CLINIC | Age: 59
End: 2024-02-22
Payer: COMMERCIAL

## 2024-02-22 VITALS — DIASTOLIC BLOOD PRESSURE: 66 MMHG | SYSTOLIC BLOOD PRESSURE: 125 MMHG | WEIGHT: 183 LBS | BODY MASS INDEX: 34.58 KG/M2

## 2024-02-22 DIAGNOSIS — N89.8 VAGINAL DISCHARGE: Primary | ICD-10-CM

## 2024-02-22 DIAGNOSIS — Z01.818 PREOP TESTING: ICD-10-CM

## 2024-02-22 DIAGNOSIS — Z98.890 HISTORY OF SUBURETHRAL SLING PROCEDURE: ICD-10-CM

## 2024-02-22 DIAGNOSIS — T83.712A: ICD-10-CM

## 2024-02-22 DIAGNOSIS — Z98.890 HISTORY OF CYSTOSCOPY: ICD-10-CM

## 2024-02-22 DIAGNOSIS — T83.711S: Primary | ICD-10-CM

## 2024-02-22 LAB
POC APPEARANCE, URINE: CLEAR
POC BILIRUBIN, URINE: NEGATIVE
POC BLOOD, URINE: ABNORMAL
POC COLOR, URINE: YELLOW
POC GLUCOSE, URINE: NEGATIVE MG/DL
POC KETONES, URINE: NEGATIVE MG/DL
POC LEUKOCYTES, URINE: NEGATIVE
POC NITRITE,URINE: NEGATIVE
POC PH, URINE: 6.5 PH
POC PROTEIN, URINE: NEGATIVE MG/DL
POC SPECIFIC GRAVITY, URINE: 1.01
POC UROBILINOGEN, URINE: 0.2 EU/DL

## 2024-02-22 PROCEDURE — 99214 OFFICE O/P EST MOD 30 MIN: CPT | Mod: 95,25 | Performed by: OBSTETRICS & GYNECOLOGY

## 2024-02-22 PROCEDURE — 99214 OFFICE O/P EST MOD 30 MIN: CPT | Performed by: OBSTETRICS & GYNECOLOGY

## 2024-02-22 PROCEDURE — 81003 URINALYSIS AUTO W/O SCOPE: CPT | Mod: QW | Performed by: OBSTETRICS & GYNECOLOGY

## 2024-02-22 PROCEDURE — 52000 CYSTOURETHROSCOPY: CPT | Performed by: OBSTETRICS & GYNECOLOGY

## 2024-02-22 PROCEDURE — 2500000005 HC RX 250 GENERAL PHARMACY W/O HCPCS: Performed by: OBSTETRICS & GYNECOLOGY

## 2024-02-22 PROCEDURE — 2500000002 HC RX 250 W HCPCS SELF ADMINISTERED DRUGS (ALT 637 FOR MEDICARE OP, ALT 636 FOR OP/ED): Performed by: OBSTETRICS & GYNECOLOGY

## 2024-02-22 RX ORDER — SODIUM CHLORIDE, SODIUM LACTATE, POTASSIUM CHLORIDE, CALCIUM CHLORIDE 600; 310; 30; 20 MG/100ML; MG/100ML; MG/100ML; MG/100ML
100 INJECTION, SOLUTION INTRAVENOUS CONTINUOUS
Status: CANCELLED | OUTPATIENT
Start: 2024-02-22

## 2024-02-22 RX ORDER — PHENAZOPYRIDINE HYDROCHLORIDE 200 MG/1
200 TABLET, FILM COATED ORAL ONCE
Status: CANCELLED | OUTPATIENT
Start: 2024-02-22 | End: 2024-02-22

## 2024-02-22 RX ADMIN — NITROFURANTOIN (MONOHYDRATE/MACROCRYSTALS) 100 MG: 75; 25 CAPSULE ORAL at 15:00

## 2024-02-22 RX ADMIN — LIDOCAINE HYDROCHLORIDE 1 APPLICATION: 20 JELLY TOPICAL at 15:05

## 2024-02-22 ASSESSMENT — ENCOUNTER SYMPTOMS
CONSTITUTIONAL NEGATIVE: 1
MUSCULOSKELETAL NEGATIVE: 1
PSYCHIATRIC NEGATIVE: 1
ENDOCRINE NEGATIVE: 1
EYES NEGATIVE: 1
RESPIRATORY NEGATIVE: 1
CARDIOVASCULAR NEGATIVE: 1
NEUROLOGICAL NEGATIVE: 1
GASTROINTESTINAL NEGATIVE: 1

## 2024-02-22 ASSESSMENT — PAIN SCALES - GENERAL: PAINLEVEL: 0-NO PAIN

## 2024-02-22 NOTE — PROGRESS NOTES
[facility] Department of Urogynecology   Megan Tucker MD, MPH   [phone]    ASSESSMENT AND PLAN:   58 year old female with erosion of suburethral sling that was placed in 2013 for DOMINICK, dyspareunia, and vaginal bleeding.     Diagnoses:  #1 Erosion of suburethral sling  #2 Vaginal bleeding  #3 Dyspareunia     Plan:  1. Erosion of suburethral sling, DOMINICK  - Hx of midurethral sling in 2013 with Dr. Lei and now endorsing VB with malodorous discharge and dyspareunia.   - Upon exam today the midurethral sling mesh erosion visualized with blue mesh noted on the left side anterior vaginal wall, mesh erosion is around 1cm in size. No mesh erosion on the right side of the anterior wall from the suburethral sling.   - We discussed the risks of the cystoscopy including UTI, dysuria, and hematuria. If she feels symptomatic of a UTI we advised her to call our office. There is a very small risk of injury to the bladder or urethra due to the cystoscope. We counseled that is a risk that there could be abnormal cells that are not visible to the naked eye that we are unable to detect by cystoscopy today. If we find something abnormal or suspicious we may take a biopsy of the mass or fluid and we would refer her to urology. The patient provided verbal understanding and signed her consent prior to the procedure today.   - Cystoscopy today demonstrated a normal bladder lining and mucosa, normal bladder neck upon retroflexion, normal urethra, and bilateral orthotopic ureters. No evidence of bladder polyps, stones, or masses were visualized.   - There is no evidence of mesh erosion from the sling on the bladder which we discussed is reassuring.     Mesh Erosion Management:  - We discussed treatment options moving forward such as vaginal mesh excision and we counseled her that the larger the piece of mesh we remove the higher the chances of her DOMINICK symptoms returning. Therefore we offered her L sided (site of eroded mesh)  mesh excision vs. entire mesh excision to eliminate the chance of mesh erosion from occurring again in the future although her DOMINICK sxs would likely return with this option however if DOMINICK returns she may consider Bulkamid injection. We reviewed the risks of surgery including a 30% risk of POUR for which she would wear a Claudio catheter for 24 hours until she passes her voiding trial the following day. She is amenable to removing a larger portion of the midurethral sling mesh that has eroded (bilaterally instead of just the L side).   - Will need PAT and does not require PCP clearance.   - Case request sent for excision suburethral sling excision and cystoscopy with Dr. Megan Tucker. Will plan on excising the vaginal portion of the sling bilaterally- She understands DOMINICK can return and that she may need additional treatment for this in the future.     2. Dyspareunia  - If she continues to endorse dyspareunia after sling excision we recommend going to PFPT for relaxation exercises.     Follow up for excision of MUS    Scribe Attestation   By signing my name below, I, Allegra Chowibluis antonio, attest that this documentation has been prepared under the direction and in the presence of Megan Tucker MD MPH on 02/22/2024 at 6:20 PM.     Problem List Items Addressed This Visit    None  Visit Diagnoses       Erosion of suburethral sling, initial encounter (CMS/[facility])        Relevant Orders    POCT UA Automated manually resulted (Completed)           I spent a total of 30 minutes in face to face and non face to face time.      Megan Tucker MD, MPH, FACOG     I Dr. Tucker, personally performed the services described in the documentation as scribed in my presence and confirm it is both complete and accurate.  Megan Tucker MD, MPH, FACOG    Established    HISTORY OF PRESENT ILLNESS:   58 year old female presenting for cystoscopy to work up possible suburethral sling erosion.     Record Review:   - 2/6/2024 Zoila Maloney  APRN-CNP note reviewed: TVT sling erosion with vaginal bleeding - Hx of TVT placed in  by Dr. Lei. The sling erosion was identified on pelvic exam with palpation located on the anterior wall on the left side but was unable to visualize erosion due to vaginal bleeding. She was endorsing a foul smelling vaginal discharge and started tx with Flagyl. Referred to Dr. Tucker for a cystoscopy to evaluate sling erosion for consideration of mesh excision.      Vaginal Symptoms:   - The patient reports having vaginal bleeding secondary to her midurethral sling that was placed in  for DOMINICK. She is amenable to getting a cystoscopy today to evaluate any further erosion from the sling.   - Her VB is characterized by a light flow in comparison to 2 weeks ago; she now only wears one pad per day.   - Endorses dyspareunia that has been ongoing for the past 2 years.   - She previously tried using tv estrogen cream to help the vaginal tissue heal over the mesh erosion with failure.   - Denies DOMINICK with coughing, laughing, and sneezing.   - Endorses occasional urinary urgency but largely denies UUI episodes from occurring.     OBGYN History:   - , x3  and x1   - Weight of largest baby: 9#  - Hx of OASI and pelvic fx during delivery of her baby that weighed 9 pounds.        Past Medical History:     Past Medical History:   Diagnosis Date    Arthropathy, unspecified 2018    Arthropathy    Hypoactive sexual desire disorder 2020    Hypoactive sexual desire disorder    Postmenopausal atrophic vaginitis 2020    Vaginal atrophy    Status post abdominal supracervical subtotal hysterectomy        Past Surgical History:    Past Surgical History:   Procedure Laterality Date     SECTION, CLASSIC  2014     Section    HYSTERECTOMY  2014    Open supracervical hyst w/ Dr. Tanner     OTHER SURGICAL HISTORY  2020    Gastric bypass surgery    OTHER SURGICAL HISTORY   06/17/2014    Arthrotomy Of Knee    OTHER SURGICAL HISTORY  06/17/2014    Laparoscopic Sling Operation For Stress Incontinence    OTHER SURGICAL HISTORY  06/17/2014    Foot Surgery Left    OTHER SURGICAL HISTORY  06/17/2014    Ovarian Cystectomy Right    TONSILLECTOMY  06/17/2014    Tonsillectomy    UMBILICAL HERNIA REPAIR  06/17/2014    with mesh       Medications:     Prior to Admission medications    Medication Sig Start Date End Date Taking? Authorizing Provider   acetaminophen (Tylenol Arthritis Pain) 650 mg ER tablet Take 1 tablet (650 mg) by mouth every 8 hours if needed. 4/20/15   Historical Provider, MD   Addyi 100 mg tablet 1 tablet once daily. 4/10/23   Historical Provider, MD   azelastine (Astelin) 137 mcg (0.1 %) nasal spray Administer 2 sprays into affected nostril(s) twice a day. 4/2/20   Historical Provider, MD   biotin 5 mg tablet Take by mouth. 8/31/23   Historical Provider, MD   calcium-magnesium-zinc 333-133-5 mg tablet Take 1 tablet by mouth once daily. 9/29/22   Historical Provider, MD   citrull-argin-pine xt-hellen hip (Ristela) 423-095- mg tablet Take 1 tablet by mouth once daily. 9/29/22   Historical Provider, MD   cyanocobalamin (Vitamin B-12) 500 mcg tablet Take 1 tablet (500 mcg) by mouth once daily. 4/28/17   Historical Provider, MD   Intrarosa 6.5 mg insert Insert 1 Application into the vagina once daily at bedtime. 3/6/23   Historical Provider, MD   metroNIDAZOLE (Metrogel) 0.75 % (37.5mg/5 gram) vaginal gel Insert 1 Application into the vagina once daily at bedtime. 6/29/22   Historical Provider, MD   multivit-min-ferrous fumarate (Multi Vitamin) 9 mg iron/15 mL liquid Take by mouth. 5/7/18   Historical Provider, MD        ROS  Review of Systems   Constitutional: Negative.    HENT: Negative.     Eyes: Negative.    Respiratory: Negative.     Cardiovascular: Negative.    Gastrointestinal: Negative.    Endocrine: Negative.    Genitourinary:  Positive for dyspareunia, vaginal bleeding  and vaginal discharge.   Musculoskeletal: Negative.    Neurological: Negative.    Psychiatric/Behavioral: Negative.            PHYSICAL EXAM:    /66   Wt 83 kg (183 lb)   BMI 34.58 kg/m²   No LMP recorded. Patient has had a hysterectomy.    Declines chaperone for physical exam.      Well developed, well nourished, in no apparent distress.   Neurologic/Psychiatric:  Awake, Alert and Oriented times 3.  Affect normal.     GENITAL/URINARY:     External Genitalia:  The patient has normal appearing external genitalia, normal skenes and bartholins glands, and a normal hair distribution.  Her vulva is without lesions, erythema or discharge.  It is non-tender with appropriate sensation.     Urethral Meatus:  Size normal, Location normal, Lesions absent, Prolapse absent,      Urethra:  Fullness absent, Masses absent.    Bladder:  Fullness absent, Masses absent, Tenderness absent.    Vagina:  General appearance normal, Discharge absent, Lesions absent. Hypoestrogenic vaginal epithelium. Midurethral sling mesh erosion visualized with blue mesh noted on the left side anterior vaginal wall, mesh erosion is around 1cm in size. No mesh erosion on the right side from the suburethral sling.     PROCEDURE:   Patient presents for Cystoscopy due to  midurethral sling erosion . UA results were reviewed and showed moderate blood.     Risks, benefits, and alternative were discussed in detail.  Patient appears to understand and agrees to proceed.  Patient has signed the procedure consent form.    Cystoscopy findings:  Introitus: unremarkable introitus finding  Urethra: unremarkable urethra finding  Bladder: unremarkable bladder finding, no mesh erosion from the sling    Normal urethral and bladder epithelium. Orthotopic ureters with bilateral efflux seen. No stones, tumors, lesions or evidence of injury seen.       Good hemostasis was obtained and the bladder was drained.       Data and DIAGNOSTIC STUDIES REVIEWED   Lab Results    Component Value Date    URINECULTURE No significant growth 12/11/2023      Lab Results   Component Value Date    GLUCOSE 91 09/29/2022    CALCIUM 9.2 09/29/2022     09/29/2022    K 4.0 09/29/2022    CO2 29 09/29/2022     09/29/2022    BUN 12 09/29/2022    CREATININE 0.96 09/29/2022     Lab Results   Component Value Date    WBC 5.4 09/29/2022    HGB 13.6 09/29/2022    HCT 42.2 09/29/2022    MCV 84 09/29/2022     09/29/2022

## 2024-02-23 ENCOUNTER — TELEPHONE (OUTPATIENT)
Dept: OBSTETRICS AND GYNECOLOGY | Facility: CLINIC | Age: 59
End: 2024-02-23
Payer: COMMERCIAL

## 2024-02-23 PROBLEM — T83.711A: Status: ACTIVE | Noted: 2024-02-22

## 2024-02-23 RX ORDER — METRONIDAZOLE 7.5 MG/G
GEL VAGINAL 2 TIMES DAILY
Qty: 70 G | Refills: 0 | Status: SHIPPED | OUTPATIENT
Start: 2024-02-23 | End: 2024-02-28

## 2024-02-23 RX ORDER — NITROFURANTOIN 25; 75 MG/1; MG/1
100 CAPSULE ORAL ONCE
Status: COMPLETED | OUTPATIENT
Start: 2024-02-23 | End: 2024-02-22

## 2024-02-23 RX ORDER — LIDOCAINE HYDROCHLORIDE 20 MG/ML
1 JELLY TOPICAL ONCE
Status: COMPLETED | OUTPATIENT
Start: 2024-02-23 | End: 2024-02-22

## 2024-02-23 NOTE — TELEPHONE ENCOUNTER
I spoke with Dr Duarte in regard to scheduling her surgery with Dr Tucker for revision or removal of sling. She is requesting procedure at [facility], despite the fact that I can get her in other locations much sooner. I explained that the first availability there will be April 17, 2024. She is adament about her surgery be there. Request sent to [facility]. She wants to be put on waiting list, if a sooner date becomes available.

## 2024-02-26 ENCOUNTER — PATIENT MESSAGE (OUTPATIENT)
Dept: OBSTETRICS AND GYNECOLOGY | Facility: CLINIC | Age: 59
End: 2024-02-26
Payer: COMMERCIAL

## 2024-02-26 DIAGNOSIS — T83.711S: Primary | ICD-10-CM

## 2024-02-26 DIAGNOSIS — N95.2 VAGINAL ATROPHY: ICD-10-CM

## 2024-02-29 ENCOUNTER — TELEPHONE (OUTPATIENT)
Dept: OBSTETRICS AND GYNECOLOGY | Facility: CLINIC | Age: 59
End: 2024-02-29
Payer: COMMERCIAL

## 2024-02-29 NOTE — TELEPHONE ENCOUNTER
Call to patient to let her know that the date of 3/20/2024 has become available if she would like to move her surgery to that date. She agrees.

## 2024-03-11 DIAGNOSIS — F52.0 HYPOACTIVE SEXUAL DESIRE DISORDER: Primary | ICD-10-CM

## 2024-03-11 RX ORDER — METRONIDAZOLE 500 MG/1
500 TABLET ORAL 2 TIMES DAILY
Qty: 14 TABLET | Refills: 0 | Status: SHIPPED | OUTPATIENT
Start: 2024-03-11 | End: 2024-03-20

## 2024-03-11 RX ORDER — PRASTERONE 6.5 MG/1
1 INSERT VAGINAL NIGHTLY
Qty: 28 EACH | Refills: 3 | Status: SHIPPED | OUTPATIENT
Start: 2024-03-11 | End: 2024-05-02 | Stop reason: SDUPTHER

## 2024-03-13 RX ORDER — FLIBANSERIN 100 MG/1
1 TABLET, FILM COATED ORAL DAILY
Qty: 30 TABLET | Refills: 11 | Status: SHIPPED | OUTPATIENT
Start: 2024-03-13

## 2024-03-19 ENCOUNTER — ANESTHESIA EVENT (OUTPATIENT)
Dept: OPERATING ROOM | Facility: HOSPITAL | Age: 59
End: 2024-03-19
Payer: COMMERCIAL

## 2024-03-19 NOTE — H&P
Preoperative History and Physical    60 yo with erosion of suburethral sling with vaginal bleeding and dyspareunia presenting for excision of suburethral sling and cystoscopy,     Pap hx: NILM hpv other + 2023,   Pre-op CBC/BMP pending     Obhx: , x3  and x1 , hx OASIS  Gynhx: supracervical hyst, last pap nilm hpv other + 2023    Surghx:   umbilical hernia repair with mesh,  section,  abdominal supracervical hyst,  R ovarian cystectomy, gastric bypass , knee arthrotomy , left foot surgery, tonsillectomy,  mid-urethral sling   All: PCN (anaphylaxis), NSAIDS (hives), Propoxyphene N-acetaminophen      Past Medical History  Past Medical History:   Diagnosis Date    Arthropathy, unspecified 2018    Arthropathy    Hypoactive sexual desire disorder 2020    Hypoactive sexual desire disorder    Postmenopausal atrophic vaginitis 2020    Vaginal atrophy    Status post abdominal supracervical subtotal hysterectomy         Past Surgical History   Past Surgical History:   Procedure Laterality Date     SECTION, CLASSIC  2014     Section    HYSTERECTOMY  2014    Open supracervical hyst w/ Dr. Tanner     OTHER SURGICAL HISTORY  2020    Gastric bypass surgery    OTHER SURGICAL HISTORY  2014    Arthrotomy Of Knee    OTHER SURGICAL HISTORY  2014    Laparoscopic Sling Operation For Stress Incontinence    OTHER SURGICAL HISTORY  2014    Foot Surgery Left    OTHER SURGICAL HISTORY  2014    Ovarian Cystectomy Right    TONSILLECTOMY  2014    Tonsillectomy    UMBILICAL HERNIA REPAIR  2014    with mesh       Social History  Social History     Tobacco Use    Smoking status: Never    Smokeless tobacco: Never   Substance Use Topics    Alcohol use: Not on file     Substance and Sexual Activity   Drug Use Not on file       Allergies  Penicillins, Nsaids (non-steroidal anti-inflammatory drug), and  Propoxyphene n-acetaminophen     Medications  No medications prior to admission.       Objective    Last Vitals  Temp Pulse Resp BP MAP O2 Sat                   Physical Examination  Gen: NAD  HEENT: NCAT  Resp: normal work of breathing on room air  Card: regular rate  Neuro: grossly intact   Psych: approipriate  Motor: moving all extremities spontaneously   Abdomen: Non distended      Lab Review  Labs in chart were reviewed.    A/P  58 yo with erosion of suburethral sling place 2013 presenting for scheduled excision     Excision of suburethral sling  - plan for surgery as scheduled, with cystoscopy     Dw Dr Caitlin Steele MD  OB/GYN PGY3

## 2024-03-20 ENCOUNTER — HOSPITAL ENCOUNTER (OUTPATIENT)
Facility: HOSPITAL | Age: 59
Setting detail: OUTPATIENT SURGERY
Discharge: HOME | End: 2024-03-20
Attending: OBSTETRICS & GYNECOLOGY | Admitting: OBSTETRICS & GYNECOLOGY
Payer: COMMERCIAL

## 2024-03-20 ENCOUNTER — ANESTHESIA (OUTPATIENT)
Dept: OPERATING ROOM | Facility: HOSPITAL | Age: 59
End: 2024-03-20
Payer: COMMERCIAL

## 2024-03-20 VITALS
RESPIRATION RATE: 15 BRPM | TEMPERATURE: 97.7 F | OXYGEN SATURATION: 94 % | BODY MASS INDEX: 35.05 KG/M2 | HEIGHT: 61 IN | DIASTOLIC BLOOD PRESSURE: 68 MMHG | WEIGHT: 185.63 LBS | SYSTOLIC BLOOD PRESSURE: 113 MMHG | HEART RATE: 81 BPM

## 2024-03-20 DIAGNOSIS — T83.711S: Primary | ICD-10-CM

## 2024-03-20 PROBLEM — G47.33 OSA (OBSTRUCTIVE SLEEP APNEA): Status: ACTIVE | Noted: 2024-03-20

## 2024-03-20 PROCEDURE — A57287 PR REMV/REVS SLING FOR STRES INCONTINENCE: Performed by: NURSE ANESTHETIST, CERTIFIED REGISTERED

## 2024-03-20 PROCEDURE — A4216 STERILE WATER/SALINE, 10 ML: HCPCS | Performed by: OBSTETRICS & GYNECOLOGY

## 2024-03-20 PROCEDURE — 2500000001 HC RX 250 WO HCPCS SELF ADMINISTERED DRUGS (ALT 637 FOR MEDICARE OP): Performed by: OBSTETRICS & GYNECOLOGY

## 2024-03-20 PROCEDURE — 3600000003 HC OR TIME - INITIAL BASE CHARGE - PROCEDURE LEVEL THREE: Performed by: OBSTETRICS & GYNECOLOGY

## 2024-03-20 PROCEDURE — 3700000001 HC GENERAL ANESTHESIA TIME - INITIAL BASE CHARGE: Performed by: OBSTETRICS & GYNECOLOGY

## 2024-03-20 PROCEDURE — 7100000002 HC RECOVERY ROOM TIME - EACH INCREMENTAL 1 MINUTE: Performed by: OBSTETRICS & GYNECOLOGY

## 2024-03-20 PROCEDURE — 88302 TISSUE EXAM BY PATHOLOGIST: CPT | Performed by: PATHOLOGY

## 2024-03-20 PROCEDURE — A57287 PR REMV/REVS SLING FOR STRES INCONTINENCE: Performed by: ANESTHESIOLOGY

## 2024-03-20 PROCEDURE — 57287 REVISE/REMOVE SLING REPAIR: CPT | Performed by: OBSTETRICS & GYNECOLOGY

## 2024-03-20 PROCEDURE — 3600000008 HC OR TIME - EACH INCREMENTAL 1 MINUTE - PROCEDURE LEVEL THREE: Performed by: OBSTETRICS & GYNECOLOGY

## 2024-03-20 PROCEDURE — 2500000001 HC RX 250 WO HCPCS SELF ADMINISTERED DRUGS (ALT 637 FOR MEDICARE OP): Performed by: ANESTHESIOLOGY

## 2024-03-20 PROCEDURE — 3700000002 HC GENERAL ANESTHESIA TIME - EACH INCREMENTAL 1 MINUTE: Performed by: OBSTETRICS & GYNECOLOGY

## 2024-03-20 PROCEDURE — 2500000004 HC RX 250 GENERAL PHARMACY W/ HCPCS (ALT 636 FOR OP/ED): Mod: JZ | Performed by: OBSTETRICS & GYNECOLOGY

## 2024-03-20 PROCEDURE — 2500000004 HC RX 250 GENERAL PHARMACY W/ HCPCS (ALT 636 FOR OP/ED): Performed by: NURSE ANESTHETIST, CERTIFIED REGISTERED

## 2024-03-20 PROCEDURE — 2720000007 HC OR 272 NO HCPCS: Performed by: OBSTETRICS & GYNECOLOGY

## 2024-03-20 PROCEDURE — 2500000004 HC RX 250 GENERAL PHARMACY W/ HCPCS (ALT 636 FOR OP/ED): Performed by: ANESTHESIOLOGY

## 2024-03-20 PROCEDURE — 0751T DGTZ GLS MCRSCP SLD LEVEL II: CPT | Mod: TC,AHULAB | Performed by: OBSTETRICS & GYNECOLOGY

## 2024-03-20 PROCEDURE — 7100000001 HC RECOVERY ROOM TIME - INITIAL BASE CHARGE: Performed by: OBSTETRICS & GYNECOLOGY

## 2024-03-20 PROCEDURE — 7100000010 HC PHASE TWO TIME - EACH INCREMENTAL 1 MINUTE: Performed by: OBSTETRICS & GYNECOLOGY

## 2024-03-20 PROCEDURE — 7100000009 HC PHASE TWO TIME - INITIAL BASE CHARGE: Performed by: OBSTETRICS & GYNECOLOGY

## 2024-03-20 RX ORDER — ALBUTEROL SULFATE 0.83 MG/ML
2.5 SOLUTION RESPIRATORY (INHALATION) ONCE AS NEEDED
Status: DISCONTINUED | OUTPATIENT
Start: 2024-03-20 | End: 2024-03-20 | Stop reason: HOSPADM

## 2024-03-20 RX ORDER — ONDANSETRON HYDROCHLORIDE 2 MG/ML
4 INJECTION, SOLUTION INTRAVENOUS ONCE AS NEEDED
Status: DISCONTINUED | OUTPATIENT
Start: 2024-03-20 | End: 2024-03-20 | Stop reason: HOSPADM

## 2024-03-20 RX ORDER — MIDAZOLAM HYDROCHLORIDE 1 MG/ML
INJECTION INTRAMUSCULAR; INTRAVENOUS AS NEEDED
Status: DISCONTINUED | OUTPATIENT
Start: 2024-03-20 | End: 2024-03-20

## 2024-03-20 RX ORDER — OXYCODONE HYDROCHLORIDE 5 MG/1
5 TABLET ORAL EVERY 6 HOURS PRN
Qty: 5 TABLET | Refills: 0 | Status: SHIPPED | OUTPATIENT
Start: 2024-03-20 | End: 2024-05-02 | Stop reason: WASHOUT

## 2024-03-20 RX ORDER — ACETAMINOPHEN 325 MG/1
975 TABLET ORAL ONCE
Status: COMPLETED | OUTPATIENT
Start: 2024-03-20 | End: 2024-03-20

## 2024-03-20 RX ORDER — DIPHENHYDRAMINE HYDROCHLORIDE 50 MG/ML
12.5 INJECTION INTRAMUSCULAR; INTRAVENOUS ONCE AS NEEDED
Status: DISCONTINUED | OUTPATIENT
Start: 2024-03-20 | End: 2024-03-20 | Stop reason: HOSPADM

## 2024-03-20 RX ORDER — ONDANSETRON HYDROCHLORIDE 2 MG/ML
INJECTION, SOLUTION INTRAVENOUS AS NEEDED
Status: DISCONTINUED | OUTPATIENT
Start: 2024-03-20 | End: 2024-03-20

## 2024-03-20 RX ORDER — IBUPROFEN 600 MG/1
600 TABLET ORAL 3 TIMES DAILY PRN
Qty: 15 TABLET | Refills: 0 | Status: SHIPPED | OUTPATIENT
Start: 2024-03-20

## 2024-03-20 RX ORDER — POLYETHYLENE GLYCOL 3350 17 G/17G
17 POWDER, FOR SOLUTION ORAL DAILY
Qty: 527 G | Refills: 1 | Status: SHIPPED | OUTPATIENT
Start: 2024-03-20 | End: 2024-05-02 | Stop reason: WASHOUT

## 2024-03-20 RX ORDER — HYDRALAZINE HYDROCHLORIDE 20 MG/ML
5 INJECTION INTRAMUSCULAR; INTRAVENOUS EVERY 30 MIN PRN
Status: DISCONTINUED | OUTPATIENT
Start: 2024-03-20 | End: 2024-03-20 | Stop reason: HOSPADM

## 2024-03-20 RX ORDER — SODIUM CHLORIDE, SODIUM LACTATE, POTASSIUM CHLORIDE, CALCIUM CHLORIDE 600; 310; 30; 20 MG/100ML; MG/100ML; MG/100ML; MG/100ML
100 INJECTION, SOLUTION INTRAVENOUS CONTINUOUS
Status: DISCONTINUED | OUTPATIENT
Start: 2024-03-20 | End: 2024-03-20 | Stop reason: HOSPADM

## 2024-03-20 RX ORDER — SODIUM CHLORIDE, SODIUM LACTATE, POTASSIUM CHLORIDE, CALCIUM CHLORIDE 600; 310; 30; 20 MG/100ML; MG/100ML; MG/100ML; MG/100ML
100 INJECTION, SOLUTION INTRAVENOUS CONTINUOUS
Status: DISCONTINUED | OUTPATIENT
Start: 2024-03-20 | End: 2024-03-20 | Stop reason: SDUPTHER

## 2024-03-20 RX ORDER — PROMETHAZINE HYDROCHLORIDE 25 MG/ML
6.25 INJECTION, SOLUTION INTRAMUSCULAR; INTRAVENOUS ONCE AS NEEDED
Status: DISCONTINUED | OUTPATIENT
Start: 2024-03-20 | End: 2024-03-20 | Stop reason: HOSPADM

## 2024-03-20 RX ORDER — FENTANYL CITRATE 50 UG/ML
INJECTION, SOLUTION INTRAMUSCULAR; INTRAVENOUS AS NEEDED
Status: DISCONTINUED | OUTPATIENT
Start: 2024-03-20 | End: 2024-03-20

## 2024-03-20 RX ORDER — CLINDAMYCIN PHOSPHATE 900 MG/50ML
900 INJECTION, SOLUTION INTRAVENOUS ONCE
Status: COMPLETED | OUTPATIENT
Start: 2024-03-20 | End: 2024-03-20

## 2024-03-20 RX ORDER — OXYCODONE HYDROCHLORIDE 5 MG/1
5 TABLET ORAL EVERY 4 HOURS PRN
Status: DISCONTINUED | OUTPATIENT
Start: 2024-03-20 | End: 2024-03-20 | Stop reason: HOSPADM

## 2024-03-20 RX ORDER — PROPOFOL 10 MG/ML
INJECTION, EMULSION INTRAVENOUS AS NEEDED
Status: DISCONTINUED | OUTPATIENT
Start: 2024-03-20 | End: 2024-03-20

## 2024-03-20 RX ORDER — PHENAZOPYRIDINE HYDROCHLORIDE 100 MG/1
200 TABLET, FILM COATED ORAL ONCE
Status: COMPLETED | OUTPATIENT
Start: 2024-03-20 | End: 2024-03-20

## 2024-03-20 RX ADMIN — FENTANYL CITRATE 50 MCG: 50 INJECTION, SOLUTION INTRAMUSCULAR; INTRAVENOUS at 13:41

## 2024-03-20 RX ADMIN — ONDANSETRON 4 MG: 2 INJECTION, SOLUTION INTRAMUSCULAR; INTRAVENOUS at 13:52

## 2024-03-20 RX ADMIN — PROPOFOL 150 MG: 10 INJECTION, EMULSION INTRAVENOUS at 13:47

## 2024-03-20 RX ADMIN — PHENAZOPYRIDINE 200 MG: 100 TABLET ORAL at 11:59

## 2024-03-20 RX ADMIN — HYDROMORPHONE HYDROCHLORIDE 0.5 MG: 1 INJECTION, SOLUTION INTRAMUSCULAR; INTRAVENOUS; SUBCUTANEOUS at 15:19

## 2024-03-20 RX ADMIN — MIDAZOLAM HYDROCHLORIDE 2 MG: 1 INJECTION, SOLUTION INTRAMUSCULAR; INTRAVENOUS at 13:41

## 2024-03-20 RX ADMIN — ACETAMINOPHEN 975 MG: 325 TABLET ORAL at 11:59

## 2024-03-20 RX ADMIN — DEXAMETHASONE SODIUM PHOSPHATE 4 MG: 4 INJECTION, SOLUTION INTRA-ARTICULAR; INTRALESIONAL; INTRAMUSCULAR; INTRAVENOUS; SOFT TISSUE at 13:52

## 2024-03-20 RX ADMIN — CLINDAMYCIN PHOSPHATE 900 MG: 900 INJECTION, SOLUTION INTRAVENOUS at 12:03

## 2024-03-20 RX ADMIN — FENTANYL CITRATE 50 MCG: 50 INJECTION, SOLUTION INTRAMUSCULAR; INTRAVENOUS at 13:47

## 2024-03-20 RX ADMIN — SODIUM CHLORIDE, POTASSIUM CHLORIDE, SODIUM LACTATE AND CALCIUM CHLORIDE 100 ML/HR: 600; 310; 30; 20 INJECTION, SOLUTION INTRAVENOUS at 11:59

## 2024-03-20 RX ADMIN — FENTANYL CITRATE 100 MCG: 50 INJECTION, SOLUTION INTRAMUSCULAR; INTRAVENOUS at 14:00

## 2024-03-20 RX ADMIN — GENTAMICIN SULFATE 420 MG: 40 INJECTION, SOLUTION INTRAMUSCULAR; INTRAVENOUS at 12:50

## 2024-03-20 RX ADMIN — FENTANYL CITRATE 50 MCG: 50 INJECTION, SOLUTION INTRAMUSCULAR; INTRAVENOUS at 14:48

## 2024-03-20 RX ADMIN — OXYCODONE HYDROCHLORIDE 5 MG: 5 TABLET ORAL at 15:46

## 2024-03-20 SDOH — HEALTH STABILITY: MENTAL HEALTH: CURRENT SMOKER: 0

## 2024-03-20 ASSESSMENT — PAIN SCALES - GENERAL
PAINLEVEL_OUTOF10: 2
PAINLEVEL_OUTOF10: 6
PAINLEVEL_OUTOF10: 2
PAINLEVEL_OUTOF10: 6
PAINLEVEL_OUTOF10: 9
PAINLEVEL_OUTOF10: 4

## 2024-03-20 ASSESSMENT — COLUMBIA-SUICIDE SEVERITY RATING SCALE - C-SSRS
2. HAVE YOU ACTUALLY HAD ANY THOUGHTS OF KILLING YOURSELF?: NO
6. HAVE YOU EVER DONE ANYTHING, STARTED TO DO ANYTHING, OR PREPARED TO DO ANYTHING TO END YOUR LIFE?: NO
1. IN THE PAST MONTH, HAVE YOU WISHED YOU WERE DEAD OR WISHED YOU COULD GO TO SLEEP AND NOT WAKE UP?: NO

## 2024-03-20 ASSESSMENT — PAIN - FUNCTIONAL ASSESSMENT
PAIN_FUNCTIONAL_ASSESSMENT: 0-10

## 2024-03-20 ASSESSMENT — PAIN DESCRIPTION - DESCRIPTORS: DESCRIPTORS: ACHING

## 2024-03-20 ASSESSMENT — LIFESTYLE VARIABLES
HAVE YOU OR SOMEONE ELSE BEEN INJURED AS A RESULT OF YOUR DRINKING: NO
HAS A RELATIVE, FRIEND, DOCTOR, OR ANOTHER HEALTH PROFESSIONAL EXPRESSED CONCERN ABOUT YOUR DRINKING OR SUGGESTED YOU CUT DOWN: NO
SKIP TO QUESTIONS 9-10: 1
AUDIT TOTAL SCORE: 2
AUDIT-C TOTAL SCORE: 2
HOW MANY STANDARD DRINKS CONTAINING ALCOHOL DO YOU HAVE ON A TYPICAL DAY: 1 OR 2
HOW OFTEN DO YOU HAVE SIX OR MORE DRINKS ON ONE OCCASION: NEVER
HOW OFTEN DO YOU HAVE A DRINK CONTAINING ALCOHOL: 2-4 TIMES A MONTH

## 2024-03-20 NOTE — OP NOTE
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, sequela [T83.711S] Post-op Diagnosis     * Erosion of implanted vaginal mesh and prosthetic materials, sequela [T83.711S]     Procedures  Bladder Sling Removal vs Revision  00447 - GA RMVL/REVJ SLING STRESS INCONTINENCE      Surgeons      * Megan Tucker - Primary    Resident/Fellow/Other Assistant:  Surgeon(s) and Role: SA    Procedure Summary  Anesthesia: General  ASA: II  Anesthesia Staff: Anesthesiologist: Markie Lenz MD; Florin Henriquez MD  CRNA: SHABBIR Berger-CRNA  Estimated Blood Loss: 50mL  Intra-op Medications:   Administrations occurring from 1230 to 1400 on 24:   Medication Name Total Dose   lactated Ringer's infusion Cannot be calculated   clindamycin in D5W (Cleocin) IVPB 900 mg Cannot be calculated   gentamicin (Garamycin) 420 mg in dextrose 5 % in water (D5W) 100 mL IV Cannot be calculated              Anesthesia Record               Intraprocedure I/O Totals       None           Specimen:   ID Type Source Tests Collected by Time   1 : MESH Tissue MESH SURGICAL PATHOLOGY EXAM Megan Tucker MD MPH 3/20/2024 1436        Staff:   Circulator: Nataly Boudreaux RN  Relief Circulator: Amaya Riojas RN  Relief Scrub: Taylor Mcwilliams  Scrub Person: Katie Talamantes; Catherine Zaman         Drains and/or Catheters:   Urethral Catheter Non-latex 16 Fr. (Active)       Tourniquet Times:         Implants:     Findings: Normal urethral and bladder epithelium. Orthotopic ureters with bilateral efflux seen at the end of the procedure. No stones, tumors, lesions or evidence of injury seen.       Indications: Kasey Duarte is an 59 y.o. female who is having surgery for Erosion of implanted vaginal mesh and prosthetic materials, sequela  [T84.836S]. Had a sling placed in 2013 now with vaginal erosion. Desires entire vaginal portion removed.     The patient was seen in the preoperative area. The risks, benefits, complications, treatment options, non-operative alternatives, expected recovery and outcomes were discussed with the patient. The possibilities of reaction to medication, pulmonary aspiration, injury to surrounding structures, bleeding, recurrent infection, the need for additional procedures, failure to diagnose a condition, and creating a complication requiring transfusion or operation were discussed with the patient. The patient concurred with the proposed plan, giving informed consent.  The site of surgery was properly noted/marked if necessary per policy. The patient has been actively warmed in preoperative area. Preoperative antibiotics have been ordered and given within 1 hours of incision. Venous thrombosis prophylaxis have been ordered including bilateral sequential compression devices    Procedure Details:     FINDINGS: Exam under anesthesia revealed a normal vagina. There was a palpable portion of mesh under the urethra and in the anterior left sulcus.  A cystoscopy was done and did not reveal any evidence of mesh erosion into the urethra or bladder. There was also no evidence of injury to the bladder following the removal of the mesh. Efflux of urine was seen from both ureteral orifices at the conclusion of the case.    PROCEDURE PERFORMED: After the patient's identity and intended procedures were confirmed, she was brought to the operating room. Anesthesia was then administered.  Patient was placed in the dorsal lithotomy position in candy cane stirrups and prepped and draped in the normal sterile fashion.     Cystourethroscopy was performed in the office confirming the absence of any mesh erosion in the urethra or bladder.     A speculum was placed in the posterior aspect of the vagina and a [place] retractor was used to  visualize the suburethral area well. The area to the right of the midline was again identified. An inverted U incision was made and the vaginal flap was developed. An area of mesh was appreciated and isolated. The mesh was divided in the midline with a curved thompson scissors.  This was dissected free from the underlying vaginal mucosa and the urethral and bladder muscularis. Bleeding from the underlying urethral and bladder muscularis was controlled with interrupted 4-0 Vicryl sutures. The area was copiously irrigated. The vaginal inverted U incision was then closed with running 4-0 Vicryl suture.    Cystoscopy was repeated after the procedure and there was found to be no efflux from the right UO. The 4-0 vicryl that was used to close this side of the vaginal wall was then cut and the vaginal epithelium was closed.     Cystoscopy was repeated after the procedure and there was no evidence of injury to the urethra or bladder base. Efflux of urine was seen from both ureteral orifices.    The patient tolerated the procedure well. Sponge, instrument, and needle counts were correct x 2. The patient was awakened from general anesthesia and brought to the recovery room in stable condition.   Complications:  None; patient tolerated the procedure well.    Disposition: PACU - hemodynamically stable.  Condition: stable             Megan Tucker  Phone Number: [phone]

## 2024-03-20 NOTE — DISCHARGE INSTRUCTIONS
Urogynecology Post Operative instructions  Clinic number: [phone]    Call your physicians office or go to the nearest emergency room if you have any of the following:   · Fever higher than 100.4 F, chills, sweats.   · Redness, tenderness, increased swelling or drainage at incision.   · Difficulty swallowing or eating.  · Nausea or vomiting.  · Increased pain or pain that is not controlled.   · Increased cough or productive cough of yellow or green colored phlegm.   · Vaginal bleeding soaking more than a pad/hour.  · Any other symptoms that are concerning to you  · Go IMMEDIATELY to the emergency department if you experience shortness of breath    Medications:     For Pain:   · Tylenol (acetaminophen) and ibuprofen are your first line therapies for pain. You can take each of these medications every 6 (six) hours. You can alternate taking doses of these medications so that you have a pain medication available to take every 3 hours. For example, you can take Tylenol at 9am, then ibuprofen at noon, then Tylenol again at 3 pm, etc.  · You have been given a prescription for a narcotic pain medication, oxycodone, to help with postoperative discomfort.  You can take this medication if you have taken Tylenol and ibuprofen and your pain is still greater than a 4 out of 10. The best way to wean off of narcotic pain medications is by alternating them with Ibuprofen and by slowly lengthening the time between your doses of narcotic pain medication.    · You may also try a heating pad to help relieve your pain. Be certain to protect your skin by placing a towel between you and the heating pad. DO NOT fall asleep with the heating pad in place.     For Constipation:   · Narcotic pain medications cause constipation, so you should take Miralax, once or twice daily as long as you are taking narcotic pain medication.    · If no bowel movement in 48 hours, try Milk of Magnesia.   · Drink 6 glasses of water per day.     Wound Care:    As you heal, your incision will look better and the soreness will go away. You may also feel numbness or a “pins and needle” sensation in the area of your incision.   · You may shower once you return home. Wash the incision(s) gently with soap and water during your regular shower and pat dry with a clean towel.   · DO NOT take a bath or submerge your incision in water (NO swimming or hot tubs) for three (3) weeks.   · Do not put any ointments or creams on your incision for 3 to 4 weeks (they can hinder healing).   · Some vaginal bleeding is normal after vaginal surgery. If you are soaking pads in 1-2 hours, please call the office.       It is very important to keep all of your post operative clinic appointments.

## 2024-03-20 NOTE — ANESTHESIA PREPROCEDURE EVALUATION
Patient: Kasey Duarte    Procedure Information       Date/Time: 03/20/24 1230    Procedure: Bladder Sling Removal vs Revision    Location: [facility] OR 06 / [facility]    Surgeons: Megan Tucker MD MPH     .anes       Relevant Problems   Anesthesia (within normal limits)      /Renal   (+) Pyelonephritis      Pulmonary   (+) STEF (obstructive sleep apnea) (Resolved since gastric bypass)      Infectious Disease   (+) Pyelonephritis       Clinical information reviewed:   Tobacco  Allergies  Meds   Med Hx  Surg Hx  OB Status  Fam Hx  Soc   Hx        NPO Detail:  NPO/Void Status  Carbohydrate Drink Given Prior to Surgery? : N  Date of Last Liquid: 03/20/24  Time of Last Liquid: 0500  Date of Last Solid: 03/19/24  Time of Last Solid: 2200  Last Intake Type: Clear fluids (water)  Time of Last Void: 1100         Physical Exam    Airway  Mallampati: III  TM distance: >3 FB  Neck ROM: full     Cardiovascular    Dental    Pulmonary    Abdominal            Anesthesia Plan    History of general anesthesia?: yes  History of complications of general anesthesia?: no    ASA 2     general     The patient is not a current smoker.    intravenous induction   Anesthetic plan and risks discussed with patient.    Plan discussed with CRNA.

## 2024-03-20 NOTE — ANESTHESIA PROCEDURE NOTES
Airway  Date/Time: 3/20/2024 1:48 PM  Urgency: elective    Airway not difficult    Staffing  Performed: CRNA   Authorized by: Markie Lenz MD    Performed by: SHABBIR Berger-CRNA  Patient location during procedure: OR    Indications and Patient Condition  Indications for airway management: anesthesia  Spontaneous ventilation: present  Sedation level: deep  Preoxygenated: yes  Patient position: sniffing  Mask difficulty assessment: 0 - not attempted    Final Airway Details  Final airway type: supraglottic airway      Successful airway: classic (I GEL)  Size 4     Number of attempts at approach: 1

## 2024-03-20 NOTE — PERIOPERATIVE NURSING NOTE
1506: Phase 1 care begins  1515: Pt family updated via message  1519: 0.5 dilaudid given per emr order  1530: Pt tolerating joe crackers and ginger ale, denies any nausea  1546: 5 oxy given per emr order  1630: Phase 2 care begins  1700: Void trial started  1710: Catheter pulled  1722: Pt successfully voided 280 mL with PVR 18 upon bladder scan  1735: Discharge instructions reviewed with pt and son, all questions answered at this time  1750: IV removed  1755: Pt transported to [facility]

## 2024-03-20 NOTE — ANESTHESIA POSTPROCEDURE EVALUATION
Patient: Kasey Duarte    Procedure Summary       Date: 03/20/24 Room / Location: [facility] OR 06 / Virtual [facility]    Anesthesia Start: 1341 Anesthesia Stop: 1509    Procedure: Bladder Sling Removal vs Revision Diagnosis:       Erosion of implanted vaginal mesh and prosthetic materials, sequela      (Erosion of implanted vaginal mesh and prosthetic materials, sequela [T83.711S])    Surgeons: Megan Tucker MD MPH Responsible Provider: SHABBIR Berger-CRNA    Anesthesia Type: general ASA Status: 2            Anesthesia Type: general    Vitals Value Taken Time   /63 03/20/24 1616   Temp 36.5 °C (97.7 °F) 03/20/24 1600   Pulse 80 03/20/24 1628   Resp 13 03/20/24 1615   SpO2 94 % 03/20/24 1628   Vitals shown include unvalidated device data.    Anesthesia Post Evaluation    Patient location during evaluation: PACU  Patient participation: complete - patient participated  Level of consciousness: awake and alert  Pain management: adequate  Airway patency: patent  Cardiovascular status: acceptable and hemodynamically stable  Respiratory status: acceptable, spontaneous ventilation and nonlabored ventilation  Hydration status: acceptable  Postoperative Nausea and Vomiting: none        There were no known notable events for this encounter.

## 2024-03-23 ENCOUNTER — TELEPHONE (OUTPATIENT)
Dept: OBSTETRICS AND GYNECOLOGY | Facility: CLINIC | Age: 59
End: 2024-03-23
Payer: COMMERCIAL

## 2024-03-23 NOTE — TELEPHONE ENCOUNTER
"Call to patient regarding follow up from surgery with Dr Tucker on 3/20/2024. She states that she is doing much better. She gets a \"little crampy\" when standing. She is just using Ibuprofen for discomfort and that is working well for her. She took double dose of Miralax yesterday and today and still has not had bowel movement. She is passing gas. I told her per Dr Tucker's protocol, the next step is milk of magnesia. Also suggested hot beverages; which she is planning to try. I told her that I would check with office to have a post op appointment scheduled for her.   "

## 2024-03-26 LAB
LABORATORY COMMENT REPORT: NORMAL
PATH REPORT.FINAL DX SPEC: NORMAL
PATH REPORT.GROSS SPEC: NORMAL
PATH REPORT.RELEVANT HX SPEC: NORMAL
PATH REPORT.TOTAL CANCER: NORMAL

## 2024-04-05 ENCOUNTER — OFFICE VISIT (OUTPATIENT)
Dept: OBSTETRICS AND GYNECOLOGY | Facility: CLINIC | Age: 59
End: 2024-04-05
Payer: COMMERCIAL

## 2024-04-05 VITALS
SYSTOLIC BLOOD PRESSURE: 105 MMHG | WEIGHT: 184 LBS | HEART RATE: 70 BPM | HEIGHT: 61 IN | BODY MASS INDEX: 34.74 KG/M2 | DIASTOLIC BLOOD PRESSURE: 66 MMHG

## 2024-04-05 DIAGNOSIS — Z98.890 HISTORY OF SUBURETHRAL SLING PROCEDURE: Primary | ICD-10-CM

## 2024-04-05 PROCEDURE — 99024 POSTOP FOLLOW-UP VISIT: CPT | Performed by: OBSTETRICS & GYNECOLOGY

## 2024-04-05 PROCEDURE — 1036F TOBACCO NON-USER: CPT | Performed by: OBSTETRICS & GYNECOLOGY

## 2024-04-05 ASSESSMENT — PAIN SCALES - GENERAL: PAINLEVEL: 4

## 2024-04-05 NOTE — PROGRESS NOTES
[facility] Department of Urogynecology   Megan Tucker MD, MPH   [phone]    ASSESSMENT AND PLAN:  59 y.o. female presenting in postop s/p removal of mid urethral sling on 3/20/24.      Diagnoses:   #1 Sling erosion, resolved     Plan:   1. Sling erosion, resolved, postop   - Hx of midurethral sling in 2013 with Dr. Lei and then developed TVT sling erosion with vaginal bleeding.   - 3/20/24: removal of mid urethral sling.   - Denies DOMINICK. Healing well on exam.    - She will return to work on April 15th, 2024. Her return to work form was filled out today.   - It takes about 1 month for stitches to fall out completely and she may notice discharge associated with the sutures until then.   - Avoid intercourse for another month. She should wait until she is 6 weeks postop.   - Patient can lift normal daily things (clothes basket, groceries), but avoid strenuous, heavy lifting.   - She can resume Intrarosa.      Follow-up in 1 month with Dr. Tucker.     Scribe Attestation:   INevaeh, am scribing for virtually, and in the presence of Megan Tucker MD MPH on 4/5/24 at 2:14 PM.     Problem List Items Addressed This Visit    None  Visit Diagnoses       History of suburethral sling procedure    -  Primary           I Dr. Tucker, personally performed the services described in the documentation as scribed in my presence and confirm it is both complete and accurate.  Megan Tucker MD, MPH, FACOG      Established    HISTORY OF PRESENT ILLNESS:     Kasey Duarte is a 59 y.o. female who presents in postop s/p removal of mid urethral sling on 3/20/24.    Record Review:   - 3/20/24: removal of mid urethral sling  - 2/22/24 note: Cysto showed no evidence of mesh erosion from the sling on the bladder. Case request sent for suburethral sling excision and cystoscopy. Dyspareunia - If she continues to endorse dyspareunia after sling excision we recommend going to PFPT.   - 2/6/2024 Zoila Maloney  APRN-CNP note reviewed: TVT sling erosion with vaginal bleeding - Hx of TVT placed in 2013 by Dr. Lei. The sling erosion was identified on pelvic exam with palpation located on the anterior wall on the left side but was unable to visualize erosion due to vaginal bleeding. She was endorsing a foul smelling vaginal discharge and started tx with Flagyl. Referred to Dr. Tucker for a cystoscopy to evaluate sling erosion for consideration of mesh excision.     Postop Symptoms:  - If she lays on one side, she may have some discomfort.   - Discomfort is improving. Last weekend when she was cooking, she felt some cramping. 2 days ago, she felt a stabbing pain. Pain is intermittent and she usually sits down when she has pain. The few times she had pain it was when she was more active.   - Endorses slightly bloody, watery discharge.   - Odor has resolved.   - Denies urine leakage. No DOMINICK.   - When she has the urge to void, she will go and urinate.      Past Medical History:     Past Medical History:   Diagnosis Date    Arthropathy, unspecified 2018    Arthropathy    Hypoactive sexual desire disorder 2020    Hypoactive sexual desire disorder    Postmenopausal atrophic vaginitis 2020    Vaginal atrophy    Status post abdominal supracervical subtotal hysterectomy           Past Surgical History:     Past Surgical History:   Procedure Laterality Date     SECTION, CLASSIC  2014     Section    HYSTERECTOMY  2014    Open supracervical hyst w/ Dr. Tanner     OTHER SURGICAL HISTORY  2020    Gastric bypass surgery    OTHER SURGICAL HISTORY  2014    Arthrotomy Of Knee    OTHER SURGICAL HISTORY  2014    Laparoscopic Sling Operation For Stress Incontinence    OTHER SURGICAL HISTORY  2014    Foot Surgery Left    OTHER SURGICAL HISTORY  2014    Ovarian Cystectomy Right    TONSILLECTOMY  2014    Tonsillectomy    UMBILICAL HERNIA REPAIR  2014     with mesh         Medications:     Prior to Admission medications    Medication Sig Start Date End Date Taking? Authorizing Provider   acetaminophen (Tylenol Arthritis Pain) 650 mg ER tablet Take 1 tablet (650 mg) by mouth every 8 hours if needed. 4/20/15  Yes Historical Provider, MD   Addyi 100 mg tablet Take 1 tablet by mouth once daily. 3/13/24  Yes Sarath Hopson, APRN-CNP   calcium-magnesium-zinc 333-133-5 mg tablet Take 1 tablet by mouth once daily. 9/29/22  Yes Historical Provider, MD   cyanocobalamin (Vitamin B-12) 500 mcg tablet Take 1 tablet (500 mcg) by mouth once daily. 4/28/17  Yes Historical Provider, MD   ibuprofen 600 mg tablet Take 1 tablet (600 mg) by mouth 3 times a day as needed for mild pain (1 - 3) (pain). 3/20/24  Yes Megan Tucker MD MPH   Intrarosa 6.5 mg insert Insert 1 Application into the vagina once daily at bedtime. 3/11/24  Yes Megan Tucker MD MPH   multivit-min-ferrous fumarate (Multi Vitamin) 9 mg iron/15 mL liquid Take by mouth. 5/7/18  Yes Historical Provider, MD   azelastine (Astelin) 137 mcg (0.1 %) nasal spray Administer 2 sprays into affected nostril(s) twice a day. 4/2/20   Historical Provider, MD   biotin 5 mg tablet Take by mouth. 8/31/23   Historical Provider, MD   citrull-argin-pine xt-hellen hip (Ristela) 329-759- mg tablet Take 1 tablet by mouth once daily. 9/29/22   Historical Provider, MD   oxyCODONE (Roxicodone) 5 mg immediate release tablet Take 1 tablet (5 mg) by mouth every 6 hours if needed for severe pain (7 - 10).  Patient not taking: Reported on 4/5/2024 3/20/24   Megan Tucker MD MPH   polyethylene glycol (Glycolax, Miralax) 17 gram/dose powder Take 17 g by mouth once daily.  Patient not taking: Reported on 4/5/2024 3/20/24   Megan Tucker MD MPH   polyethylene glycol (Glycolax, Miralax) 17 gram/dose powder Take 17 g by mouth once daily.  Patient not taking: Reported on 4/5/2024 3/20/24   Megan Tucker MD MPH        ROS  Review of Systems  "      PHYSICAL EXAM:    /66   Pulse 70   Ht 1.549 m (5' 1\")   Wt 83.5 kg (184 lb)   BMI 34.77 kg/m²   No LMP recorded. Patient has had a hysterectomy.    Declines chaperone for physical exam.      Well developed, well nourished, in no apparent distress.   Neurologic/Psychiatric:  Awake, Alert and Oriented times 3.  Affect normal.     GENITAL/URINARY:     External Genitalia:  The patient has normal appearing external genitalia, normal skenes and bartholins glands, and a normal hair distribution.  Her vulva is without lesions, erythema or discharge.  It is non-tender with appropriate sensation.     Urethral Meatus:  Size normal, Location normal, Lesions absent, Prolapse absent.    Urethra:  Fullness absent, Masses absent. Negative CST    Bladder:  Fullness absent, Masses absent, Tenderness absent.    Vagina:  General appearance normal, Discharge absent, Lesions absent, no mesh palpated, sutures in place     Anus/Perineum:  Lesions absent and Masses absent defer exam  Normal Perineum    Stress urinary incontinence not demonstrable.         Data and DIAGNOSTIC STUDIES REVIEWED   No results found.   Lab Results   Component Value Date    URINECULTURE No significant growth 12/11/2023      Lab Results   Component Value Date    GLUCOSE 91 09/29/2022    CALCIUM 9.2 09/29/2022     09/29/2022    K 4.0 09/29/2022    CO2 29 09/29/2022     09/29/2022    BUN 12 09/29/2022    CREATININE 0.96 09/29/2022     Lab Results   Component Value Date    WBC 5.4 09/29/2022    HGB 13.6 09/29/2022    HCT 42.2 09/29/2022    MCV 84 09/29/2022     09/29/2022        "

## 2024-05-01 ENCOUNTER — OFFICE VISIT (OUTPATIENT)
Dept: OBSTETRICS AND GYNECOLOGY | Facility: CLINIC | Age: 59
End: 2024-05-01
Payer: COMMERCIAL

## 2024-05-01 VITALS — SYSTOLIC BLOOD PRESSURE: 111 MMHG | DIASTOLIC BLOOD PRESSURE: 71 MMHG | HEART RATE: 86 BPM

## 2024-05-01 DIAGNOSIS — T83.711S: ICD-10-CM

## 2024-05-01 DIAGNOSIS — N95.2 VAGINAL ATROPHY: ICD-10-CM

## 2024-05-01 PROCEDURE — 99024 POSTOP FOLLOW-UP VISIT: CPT | Performed by: OBSTETRICS & GYNECOLOGY

## 2024-05-01 ASSESSMENT — PAIN SCALES - GENERAL: PAINLEVEL: 0-NO PAIN

## 2024-05-02 RX ORDER — PRASTERONE 6.5 MG/1
1 INSERT VAGINAL NIGHTLY
Qty: 28 EACH | Refills: 3 | Status: SHIPPED | OUTPATIENT
Start: 2024-05-02

## 2024-05-02 NOTE — PROGRESS NOTES
POST OPERATIVE VISIT  The patient is a 59 y.o. female who presents for a postoperative follow up visit.    She underwent a suburethral sling excision on 3/20/2024 and is now 6 week(s)  postop.    Records Review:  - 4/5/2024 Dr. Megan Tucker note RE: 2 week postop s/p removal of MUS due to sling erosion on 3/20/2024. At this time she was healing well and returned to work. She started using Intrarosa.   - 3/20/2024 surgery: Removal of midurethral sling.   - 2/22/24 note: Cystoscopy showed no evidence of mesh erosion from the sling on the bladder. Case request sent for suburethral sling excision and cystoscopy. Dyspareunia - If she continues to endorse dyspareunia after sling excision we recommend going to PFPT.   - 2/6/2024 SHABBIR Hendricks-CNP note reviewed: TVT sling erosion with vaginal bleeding - Hx of TVT placed in 2013 by Dr. Lei. The sling erosion was identified on pelvic exam with palpation located on the anterior wall on the left side but was unable to visualize erosion due to vaginal bleeding. She was endorsing a foul smelling vaginal discharge and started tx with Flagyl. Referred to Dr. Tucker for a cystoscopy to evaluate sling erosion for consideration of mesh excision.     INTERVAL HISTORY:  Postoperative Symptoms:  - She is overall healing very well from surgery without any issues or pain.   - No abnormal discharge or urinary incontinence.   - The patient is very satisfied with the outcome of her surgery.   - No recent UTI within the past month.   - She is not experiencing vulvovaginal irritation, itching, or burning.   - Denies fever, chills, nausea, or vomiting.       PHYSICAL EXAM:  /71   Pulse 86   Urine dip: No results found for this or any previous visit.     General Appearance: well developed, good nutrition, well groomed, no deformities, normal habitus  Head: normocephalic, and atraumatic  Neck: symmetric, midline trachea,  full range of motion  Respiratory: no dyspnea,  negative for intercostal retraction or uses of accessory muscles of respiration  Cardiovascular: peripheral pulses are normal, no swelling, edema or varicosities  Abdomen: Abdomen soft, non-tender, non-distended.     Pelvic:  CST: Negative in the supine position  External genitalia: normal appearance, normal Bartholin's glands and [place]'s glands   Urethra: normal meatus, non-tender, no periurethral mass  Vaginal mucosa: normal, no granulation tissue, no strictures, no mesh erosion visualized or palpated. Small piece of suture that fell out during her pelvic exam today. Scant amount of white discharge.           Impression/plan:  59 year old female presenting in postoperative follow up s/p MUS removal on 3/20/2024 due to the mesh eroding.     Diagnoses:  #1 Mesh erosion from midurethral sling (resolved)    Plan:  1. Mesh erosion from midurethral sling (resolved)  - 3/20/2024 surgery: Excision of suburethral sling.   - She is doing well postoperatively and is well-healed from surgery. The patient is not endorsing any urinary leakage or abnormal discharge.   - Preoperative symptoms resolved.  - We reviewed that the sutures lose their strength x1 month after surgery and begin to fall out. There was one suture that fell out during her pelvic exam today. Her vaginal discharge should slowly resolve with time as the sutures fall out.   - Refilled Rx of IntraRosa to use intravaginally to reduce GSM and this was sent to her pharmacy today.     Return in 1 year(s) with Dr. Megan Tucker. She can return sooner with new or worsening problems.     Scribe Attestation  By signing my name below, I, Wan Chow, attest that this documentation has been prepared under the direction and in the presence of Megan Tucker MD MPH on 05/01/2024 at 10:12 PM.       I Dr. Tucker, personally performed the services described in the documentation as scribed in my presence and confirm it is both complete and accurate.  Megan Tucker,  MD, MPH, FACOG

## 2025-01-05 DIAGNOSIS — T83.711S: ICD-10-CM

## 2025-01-05 DIAGNOSIS — N95.2 VAGINAL ATROPHY: ICD-10-CM

## 2025-01-06 RX ORDER — PRASTERONE 6.5 MG/1
INSERT VAGINAL
Qty: 28 EACH | Refills: 3 | Status: SHIPPED | OUTPATIENT
Start: 2025-01-06

## 2025-01-06 NOTE — TELEPHONE ENCOUNTER
Request received for   Requested Prescriptions     Pending Prescriptions Disp Refills    Intrarosa 6.5 mg insert [Pharmacy Med Name: INTRAROSA 6.5 MG VAG INSERT]  3     Sig: INSERT 1 INSERT VAGINALLY EVERY NIGHT AT BEDTIME       Kasey LYLY Gerald was last seen 5/1/2024 and has an appt for 3/7/2025.

## 2025-01-23 NOTE — PROGRESS NOTES
Subjective   Patient ID: Kasey Duarte is a 59 y.o. female who presents for joint pain    HPI   Over the past several years, the patient reports continued chronic intermittent episodes of aching pain in both knees.  She reports that the episodes still can be precipitated by weather changes as well as going up and down stairs.  Also, since May-June 2024, she has noted episodes of pain when walking.  She reports no recent associated instability.  She reports no other associated symptoms.  She does report that since May-June 2024, she has noted an increase in the frequency and intensity of the episodes.  The patient also informing today that she has purchased a GLP-1 agonist and will be starting the medication when it arrives in the mail.  Review of Systems    Objective   There were no vitals taken for this visit.    Physical Exam  Musculoskeletal  Right knee-no erythema or swelling.  Full range of motion in all directions of motion with no pain.  Palpation did reveal moderate tenderness over the medial surface of the knee but no increase in warmth or crepitus.  Negative Lachemann's test,?  Positive Manpreet's test with lateral rotation of the ankle.  Negative patellar apprehension test.  No pain or laxity of the collateral ligaments noted  Left knee-no erythema or swelling.  Full range of motion with soreness noted on flexion.  Full range of motion with no pain or soreness noted in all other directions of motion.  Palpation revealed no tenderness, increase in warmth, or crepitus.  Negative Lachemann's test, negative Manpreet's test, negative patellar apprehension test, no pain or laxity of the collateral ligaments noted.  Assessment/Plan   Problem List Items Addressed This Visit             ICD-10-CM    Vitamin D deficiency E55.9    Relevant Orders    CBC and Auto Differential    Comprehensive Metabolic Panel    Lipid Panel    Thyroid Stimulating Hormone    Vitamin B12    Vitamin D 25-Hydroxy,Total (for eval of  Vitamin D levels)    Arthritis Panel (CMS)    XR knee right 1-2 views    XR knee left 1-2 views    STEF (obstructive sleep apnea) G47.33    Relevant Orders    CBC and Auto Differential    Comprehensive Metabolic Panel    Lipid Panel    Thyroid Stimulating Hormone    Vitamin B12    Vitamin D 25-Hydroxy,Total (for eval of Vitamin D levels)    Arthritis Panel (CMS)    XR knee right 1-2 views    XR knee left 1-2 views     Other Visit Diagnoses         Codes    Arthralgia of both knees    -  Primary M25.561, M25.562    Relevant Orders    CBC and Auto Differential    Comprehensive Metabolic Panel    Lipid Panel    Thyroid Stimulating Hormone    Vitamin B12    Vitamin D 25-Hydroxy,Total (for eval of Vitamin D levels)    Arthritis Panel (CMS)    XR knee right 1-2 views    XR knee left 1-2 views    Arthralgia, unspecified joint     M25.50    Relevant Orders    CBC and Auto Differential    Comprehensive Metabolic Panel    Lipid Panel    Thyroid Stimulating Hormone    Vitamin B12    Vitamin D 25-Hydroxy,Total (for eval of Vitamin D levels)    Arthritis Panel (CMS)    XR knee right 1-2 views    XR knee left 1-2 views             Assessment  Chronic intermittent episodes of aching pain in both knees-likely secondary to osteoarthritis of the knees  Osteoarthritis of the knees  Obesity  Plan  Obtain x-rays of the knees as soon as possible.  Refer to orthopedic surgeon as soon as possible.  Obtain CBC differential, CMP, fasting lipid profile, TSH, vitamin D level, vitamin B12 level, cardiac CRP, arthritis panel as soon as possible.  Patient should return for a complete physical examination after having used the GLP-1 for 3 to 4 months.

## 2025-01-24 ENCOUNTER — APPOINTMENT (OUTPATIENT)
Dept: PRIMARY CARE | Facility: CLINIC | Age: 60
End: 2025-01-24
Payer: COMMERCIAL

## 2025-01-24 VITALS
TEMPERATURE: 96.9 F | SYSTOLIC BLOOD PRESSURE: 94 MMHG | HEART RATE: 68 BPM | BODY MASS INDEX: 34.93 KG/M2 | DIASTOLIC BLOOD PRESSURE: 68 MMHG | WEIGHT: 185 LBS | HEIGHT: 61 IN

## 2025-01-24 DIAGNOSIS — M25.561 ARTHRALGIA OF BOTH KNEES: Primary | ICD-10-CM

## 2025-01-24 DIAGNOSIS — M25.50 ARTHRALGIA, UNSPECIFIED JOINT: ICD-10-CM

## 2025-01-24 DIAGNOSIS — G47.33 OSA (OBSTRUCTIVE SLEEP APNEA): ICD-10-CM

## 2025-01-24 DIAGNOSIS — E55.9 VITAMIN D DEFICIENCY: ICD-10-CM

## 2025-01-24 DIAGNOSIS — M25.562 ARTHRALGIA OF BOTH KNEES: Primary | ICD-10-CM

## 2025-01-24 PROCEDURE — 99213 OFFICE O/P EST LOW 20 MIN: CPT | Performed by: INTERNAL MEDICINE

## 2025-01-24 PROCEDURE — 3008F BODY MASS INDEX DOCD: CPT | Performed by: INTERNAL MEDICINE

## 2025-02-01 LAB
ALBUMIN SERPL-MCNC: 4.2 G/DL (ref 3.6–5.1)
ALBUMIN/GLOB SERPL: 1.8 (CALC) (ref 1–2.5)
ALP SERPL-CCNC: 40 U/L (ref 37–153)
ALT SERPL-CCNC: 12 U/L (ref 6–29)
AST SERPL-CCNC: 14 U/L (ref 10–35)
BASOPHILS # BLD AUTO: 8 CELLS/UL (ref 0–200)
BASOPHILS NFR BLD AUTO: 0.2 %
BILIRUB SERPL-MCNC: 0.5 MG/DL (ref 0.2–1.2)
BUN SERPL-MCNC: 16 MG/DL (ref 7–25)
BUN/CREAT SERPL: NORMAL (CALC) (ref 6–22)
CALCIUM SERPL-MCNC: 9 MG/DL (ref 8.6–10.4)
CCP IGG SERPL-ACNC: <16 UNITS
CHLORIDE SERPL-SCNC: 107 MMOL/L (ref 98–110)
CHOLEST SERPL-MCNC: 244 MG/DL
CHOLEST/HDLC SERPL: 2.6 (CALC)
CO2 SERPL-SCNC: 27 MMOL/L (ref 20–32)
CREAT SERPL-MCNC: 0.86 MG/DL (ref 0.5–1.05)
EGFRCR SERPLBLD CKD-EPI 2021: 77 ML/MIN/1.73M2
EOSINOPHIL # BLD AUTO: 49 CELLS/UL (ref 15–500)
EOSINOPHIL NFR BLD AUTO: 1.2 %
ERYTHROCYTE [DISTWIDTH] IN BLOOD BY AUTOMATED COUNT: 14.6 % (ref 11–15)
GLOBULIN SER CALC-MCNC: 2.4 G/DL (CALC) (ref 1.9–3.7)
GLUCOSE SERPL-MCNC: 96 MG/DL (ref 65–99)
HCT VFR BLD AUTO: 40.7 % (ref 35–45)
HDLC SERPL-MCNC: 93 MG/DL
HGB BLD-MCNC: 13 G/DL (ref 11.7–15.5)
LDLC SERPL CALC-MCNC: 136 MG/DL (CALC)
LYMPHOCYTES # BLD AUTO: 1759 CELLS/UL (ref 850–3900)
LYMPHOCYTES NFR BLD AUTO: 42.9 %
Lab: NORMAL
MCH RBC QN AUTO: 26.4 PG (ref 27–33)
MCHC RBC AUTO-ENTMCNC: 31.9 G/DL (ref 32–36)
MCV RBC AUTO: 82.6 FL (ref 80–100)
MONOCYTES # BLD AUTO: 271 CELLS/UL (ref 200–950)
MONOCYTES NFR BLD AUTO: 6.6 %
NEUTROPHILS # BLD AUTO: 2013 CELLS/UL (ref 1500–7800)
NEUTROPHILS NFR BLD AUTO: 49.1 %
NONHDLC SERPL-MCNC: 151 MG/DL (CALC)
PLATELET # BLD AUTO: 294 THOUSAND/UL (ref 140–400)
PMV BLD REES-ECKER: 9.6 FL (ref 7.5–12.5)
POTASSIUM SERPL-SCNC: 4.3 MMOL/L (ref 3.5–5.3)
PROT SERPL-MCNC: 6.6 G/DL (ref 6.1–8.1)
RBC # BLD AUTO: 4.93 MILLION/UL (ref 3.8–5.1)
RHEUMATOID FACT SERPL-ACNC: <10 IU/ML
SODIUM SERPL-SCNC: 141 MMOL/L (ref 135–146)
TRIGL SERPL-MCNC: 61 MG/DL
TSH SERPL-ACNC: 0.56 MIU/L (ref 0.4–4.5)
VIT B12 SERPL-MCNC: 357 PG/ML (ref 200–1100)
WBC # BLD AUTO: 4.1 THOUSAND/UL (ref 3.8–10.8)

## 2025-02-07 ENCOUNTER — TELEPHONE (OUTPATIENT)
Dept: PRIMARY CARE | Facility: CLINIC | Age: 60
End: 2025-02-07
Payer: COMMERCIAL

## 2025-03-07 ENCOUNTER — OFFICE VISIT (OUTPATIENT)
Dept: OBSTETRICS AND GYNECOLOGY | Facility: CLINIC | Age: 60
End: 2025-03-07
Payer: COMMERCIAL

## 2025-03-07 VITALS
BODY MASS INDEX: 33.79 KG/M2 | HEIGHT: 61 IN | HEART RATE: 71 BPM | SYSTOLIC BLOOD PRESSURE: 119 MMHG | DIASTOLIC BLOOD PRESSURE: 72 MMHG | WEIGHT: 179 LBS

## 2025-03-07 DIAGNOSIS — N39.3 SUI (STRESS URINARY INCONTINENCE, FEMALE): Primary | ICD-10-CM

## 2025-03-07 DIAGNOSIS — R10.9 ABDOMINAL WALL PAIN: ICD-10-CM

## 2025-03-07 DIAGNOSIS — N39.0 RECURRENT UTI: ICD-10-CM

## 2025-03-07 LAB
POC APPEARANCE, URINE: CLEAR
POC BILIRUBIN, URINE: NEGATIVE
POC BLOOD, URINE: NEGATIVE
POC COLOR, URINE: YELLOW
POC GLUCOSE, URINE: NEGATIVE MG/DL
POC KETONES, URINE: ABNORMAL MG/DL
POC LEUKOCYTES, URINE: NEGATIVE
POC NITRITE,URINE: NEGATIVE
POC PH, URINE: 6 PH
POC PROTEIN, URINE: NEGATIVE MG/DL
POC SPECIFIC GRAVITY, URINE: >=1.03
POC UROBILINOGEN, URINE: 0.2 EU/DL

## 2025-03-07 PROCEDURE — 99214 OFFICE O/P EST MOD 30 MIN: CPT | Performed by: OBSTETRICS & GYNECOLOGY

## 2025-03-07 PROCEDURE — 81003 URINALYSIS AUTO W/O SCOPE: CPT | Performed by: OBSTETRICS & GYNECOLOGY

## 2025-03-07 PROCEDURE — 3008F BODY MASS INDEX DOCD: CPT | Performed by: OBSTETRICS & GYNECOLOGY

## 2025-03-07 ASSESSMENT — ENCOUNTER SYMPTOMS
FREQUENCY: 1
PSYCHIATRIC NEGATIVE: 1
CONSTITUTIONAL NEGATIVE: 1
MUSCULOSKELETAL NEGATIVE: 1
RESPIRATORY NEGATIVE: 1
GASTROINTESTINAL NEGATIVE: 1
CARDIOVASCULAR NEGATIVE: 1
NEUROLOGICAL NEGATIVE: 1
EYES NEGATIVE: 1
ENDOCRINE NEGATIVE: 1

## 2025-03-07 ASSESSMENT — PAIN SCALES - GENERAL: PAINLEVEL_OUTOF10: 0-NO PAIN

## 2025-03-07 NOTE — PROGRESS NOTES
[facility] Department of Urogynecology   Megan Tucker MD, MPH   [phone]    ASSESSMENT AND PLAN:   60 year old female with Nina, abdominal wall pain, and h/o MUS removal on 3/20/2024 due to mesh erosion. Comorbidities include: STEF.     Diagnoses:  #1 Stress urinary incontinence   #2 Abdominal wall pain    Plan:  1. DOMINICK  - Hx of midurethral sling in 2013 with Dr. Lei and then developed TVT sling erosion with vaginal bleeding.   - 3/20/2024 surgery: Excision of suburethral sling due to mesh erosion.   - Upon exam today there is no mesh visualized or palpated inside the vagina and she had a well-healed anterior vaginal wall. She was reassured that there was no mesh inside her vagina due to her recently experiencing scratchy discomfort with positional changes when laying in bed.  - Reviewed DOMINICK management options today since she is now experiencing DOMINICK leakage including starting PFPT to help strengthen the PF muscles around the urethra. She is amenable to this plan.   - PFPT requisition sent today and gave her the list of local physical therapists with their corresponding locations/contact information.    2. Abdominal wall pain  - Upon abdominal exam she had mild pain in the LLQ but negative Carnett's sign over the entire abdomen.   - Recommended PFPT to help reduce her abdominal wall pain vs trigger point.   - Discussed comfort measures such as gentle stretching, massage, heat/ice packs, OTC Tylenol or IBU, etc.     Follow up in 4 months with Dr. Megan Tucker.     Scribe Attestation  By signing my name below, ILucian Scribe, attest that this documentation has been prepared under the direction and in the presence of Megan Tucker MD MPH on 03/07/2025 at 5:22 PM.     Problem List Items Addressed This Visit    None  Visit Diagnoses       DOMINICK (stress urinary incontinence, female)    -  Primary    Relevant Orders    Referral to Physical Therapy    Recurrent UTI        Relevant Orders    POCT  UA Automated manually resulted (Completed)    Abdominal wall pain        Relevant Orders    Referral to Physical Therapy           I spent a total of *** minutes in face to face and non face to face time.      Megan Tucker MD, MPH, FACOG     Established    HISTORY OF PRESENT ILLNESS:   Kasey Duarte is a 61 yo female presenting for 1 year follow up after suburethral sling excision on 3/20/2024.    Records Review:  - 5/1/2024: Dr. Megan Tucker note: The patient was healing well, satisfied, no UTIs, no irritation, pain, itching, burning, discharge, or UI.   - 4/5/2024 Dr. Megan Tucker note RE: 2 week postop s/p removal of MUS due to sling erosion on 3/20/2024. At this time she was healing well and returned to work. She started using Intrarosa.   - 3/20/2024 surgery: Removal of midurethral sling.   - 2/22/24 note: Cystoscopy showed no evidence of mesh erosion from the sling on the bladder. Case request sent for suburethral sling excision and cystoscopy. Dyspareunia - If she continues to endorse dyspareunia after sling excision we recommend going to PFPT.   - 2/6/2024 SHABBIR Hendricks-CNP note reviewed: TVT sling erosion with vaginal bleeding - Hx of TVT placed in 2013 by Dr. Lei. The sling erosion was identified on pelvic exam with palpation located on the anterior wall on the left side but was unable to visualize erosion due to vaginal bleeding. She was endorsing a foul smelling vaginal discharge and started tx with Flagyl. Referred to Dr. Tucker for a cystoscopy to evaluate sling erosion for consideration of mesh excision.     Urinary Symptoms:   - She reports she was doing well until late fall when she started feeling some scraping abdominal/LLQ discomfort described as a scratchy pain with some positions while laying in bed. She says this feels similar to how the sling felt prior to removal in 3/2024.   - Patient is unsure if she has a UTI at present; endorses some urgency. Denies burning, dysuria,or  hematuria. She took azo last month to relieve urgency which helped.   - She reports recent DOMINICK leakage that started around x4-5 weeks ago.   - Patient began taking Minoxidil for hair thinning and has stopped taking Addyi due to fear of cross reaction. IntraRosa intravaginally to reduce GSM, which she says is working well for her.   - No flank pain or gross hematuria.     OBGYN History and Sexual Activity:   - She is currently sexually active and denies dyspareunia.     Bowel Symptoms:  - She has as BM daily that is soft/easy to pass.  - Denies constipation, pushing/straining to have a BM, or having a hard stool consistency (I.e. Billings scale #1-2 stools).   - No FI or ABL episodes at baseline.     Internal Medical History:  - Patient recently started GLP-1  around x5 weeks ago and has lost 12 pounds. No nausea, diarrhea, or other noticeable side effects.   - The patient endorses some scratchy pain near her prior  scar in her LLQ and this is exacerbated by positional changes/turning over in bed.        Past Medical History:     Past Medical History:   Diagnosis Date    Arthropathy, unspecified 2018    Arthropathy    Hypoactive sexual desire disorder 2020    Hypoactive sexual desire disorder    Postmenopausal atrophic vaginitis 2020    Vaginal atrophy    Status post abdominal supracervical subtotal hysterectomy           Past Surgical History:     Past Surgical History:   Procedure Laterality Date     SECTION, CLASSIC  2014     Section    HYSTERECTOMY  2014    Open supracervical hyst w/ Dr. Tanner     OTHER SURGICAL HISTORY  2020    Gastric bypass surgery    OTHER SURGICAL HISTORY  2014    Arthrotomy Of Knee    OTHER SURGICAL HISTORY  2014    Laparoscopic Sling Operation For Stress Incontinence    OTHER SURGICAL HISTORY  2014    Foot Surgery Left    OTHER SURGICAL HISTORY  2014    Ovarian Cystectomy Right    TONSILLECTOMY   "06/17/2014    Tonsillectomy    UMBILICAL HERNIA REPAIR  06/17/2014    with mesh         Medications:     Prior to Admission medications    Medication Sig Start Date End Date Taking? Authorizing Provider   acetaminophen (Tylenol Arthritis Pain) 650 mg ER tablet Take 1 tablet (650 mg) by mouth every 8 hours if needed. 4/20/15  Yes Historical Provider, MD   Addyi 100 mg tablet Take 1 tablet by mouth once daily. 3/13/24  Yes SHABBIR Khalil-CNP   biotin 5 mg tablet Take by mouth. 8/31/23  Yes Historical Provider, MD   calcium-magnesium-zinc 333-133-5 mg tablet Take 1 tablet by mouth once daily. 9/29/22  Yes Historical Provider, MD   cyanocobalamin (Vitamin B-12) 500 mcg tablet Take 1 tablet (500 mcg) by mouth once daily. 4/28/17  Yes Historical Provider, MD   ibuprofen 600 mg tablet Take 1 tablet (600 mg) by mouth 3 times a day as needed for mild pain (1 - 3) (pain). 3/20/24  Yes Megan Tucker MD MPH   Intrarosa 6.5 mg insert INSERT 1 INSERT VAGINALLY EVERY NIGHT AT BEDTIME 1/6/25  Yes Megan Tucker MD MPH   multivit-min-ferrous fumarate (Multi Vitamin) 9 mg iron/15 mL liquid Take by mouth. 5/7/18  Yes Historical Provider, MD HERNANDEZ  Review of Systems   Constitutional: Negative.    HENT: Negative.     Eyes: Negative.    Respiratory: Negative.     Cardiovascular: Negative.    Gastrointestinal: Negative.    Endocrine: Negative.    Genitourinary:  Positive for frequency and urgency.   Musculoskeletal: Negative.    Neurological: Negative.    Psychiatric/Behavioral: Negative.            PHYSICAL EXAM:    /72 (Patient Position: Sitting)   Pulse 71   Ht 1.549 m (5' 1\")   Wt 81.2 kg (179 lb)   BMI 33.82 kg/m²   No LMP recorded. Patient has had a hysterectomy.    Declines chaperone for physical exam.      Well developed, well nourished, in no apparent distress.   Neurologic/Psychiatric:  Awake, Alert and Oriented times 3.  Affect normal.     GENITAL/URINARY:     External Genitalia:  The patient has normal " appearing external genitalia, normal skenes and bartholins glands, and a normal hair distribution.  Her vulva is without lesions, erythema or discharge.  It is non-tender with appropriate sensation.     Urethral Meatus:  Size normal, Location normal, Lesions absent, Prolapse absent.    Urethra:  Fullness absent, Masses absent. Negative CST in the supine position.     Bladder:  Fullness absent, Masses absent, Tenderness absent.    Vagina:  General appearance normal, Discharge absent, Lesions absent. There is no mesh visualized or palpated inside the vagina, well-healed anterior vaginal wall.     Cervix: Normal, no discharge.   Uterus:  surgically absent  Adnexa:   no masses or tenderness over the B/L adnexa    Anus/Perineum:  Lesions absent and masses absent.     Stress urinary incontinence not demonstrable.       Physical Exam  Constitutional:       General: She is not in acute distress.     Appearance: Normal appearance.   Genitourinary:      Vulva, bladder and urethral meatus normal.      No lesions in the vagina.      Right Labia: No lesions.     Left Labia: No lesions.     Mild vaginal atrophy present.       Right Adnexa: not tender and no mass present.     Left Adnexa: no mass present.     No cervical motion tenderness.      Uterus is absent.      No urethral tenderness, mass or stress urinary incontinence with cough stress test present.   HENT:      Head: Normocephalic and atraumatic.   Pulmonary:      Effort: Pulmonary effort is normal.   Abdominal:      General: There is no distension.      Tenderness: There is abdominal tenderness in the left lower quadrant.      Comments: No pain with palpation over the LUQ, RUQ, or RLQ. Mild pain over the LLQ with negative Carnett's sign over the abdomen.    Psychiatric:         Mood and Affect: Mood normal.         Behavior: Behavior normal.         Thought Content: Thought content normal.         Judgment: Judgment normal.         The patient has {0-5 julissa:26776} out  of 5 pelvic floor muscle strength.    She {DOES/ DOES NOT:98267} have myofascial tenderness on exam.   Her highest pain score on exam is {NUMBERS 1-10:15875}       Rectal exam: Deferred.       Data and DIAGNOSTIC STUDIES REVIEWED   No results found.   Lab Results   Component Value Date    URINECULTURE No significant growth 12/11/2023      Lab Results   Component Value Date    GLUCOSE 96 01/27/2025    CALCIUM 9.0 01/27/2025     01/27/2025    K 4.3 01/27/2025    CO2 27 01/27/2025     01/27/2025    BUN 16 01/27/2025    CREATININE 0.86 01/27/2025     Lab Results   Component Value Date    WBC 4.1 01/27/2025    HGB 13.0 01/27/2025    HCT 40.7 01/27/2025    MCV 82.6 01/27/2025     01/27/2025

## 2025-04-23 NOTE — PROGRESS NOTES
Subjective   Patient ID: Kasey Duarte is a 60 y.o. female who presents for CPE    HPI   Since late February, the patient reports no episodes of aching pain in both knees.  She reports that she noted no episodes of pain in both knees after having lost 10 pounds in late February.  She has been using semaglutide since early February.  She does report a history of constant itching in the left ear x 3 weeks.  She reports no associated symptoms.    Since November, the patient reports a history of intermittent episodes of dysphagia.  She reports that the episodes are precipitated by eating meat, chicken, bulky food.  Recently, she has been experiencing pain after drinking water during an episode.  She does report that on a few occasions she has regurgitated.  Also, since November she reports passage of more mucus in her stools.  She reports no other associated symptoms.    Since December of last year, the patient reports experiencing occasional episodes of urinary incontinence.  She reports that the episodes occur when coughing with a full bladder.  Also, over the past several years, she reports continued chronic intermittent episodes of difficulty emptying the bladder.  Since her surgery 1 year ago, she reports that the episodes only occur when attempting to hold her urine for an extended period of time.  Since her surgery 1 year ago, the patient reports no urinary urgency and no urinary frequency.    Patient reports a history of occasional episodes of mild aching pain in both wrists x 4 to 6 months.  She reports that the episodes may be precipitated by barometric pressure changes.  She reports no other associated symptoms.    Over the past few years, the patient reports no insomnia.  She also reports that over the past few years it has taken much more fatigue and much more stress to precipitate episodes of palpitations.  She reports that her last episode was probably 2 years ago.  No other new complaints.      Review of  Systems  All systems have been reviewed and are normal except as previously noted  Objective   There were no vitals taken for this visit.    Physical Exam  Head - palpation revealed no tenderness over the maxillary or frontal sinuses  Ears- palpation of pinnas and traguses revealed no tenderness. External auditory canals not erythematous or swollen. Right TM not visualized secondary to impacted cerumen. Left TM not visualized secondary to impacted cerumen.  Nose- Turbinates are erythematous and slightly swollen.   No nasal septal deviation noted.   Mouth-posterior pharynx is not erythematous or swollen. Tonsillar pillars appeared normal no exudates  Neck -no lymphadenopathy. Thyroid gland not enlarged. , No bruits  Breasts-no asymmetry or nipple discharge. The patient's nipples are pierced. No erythema or drainage noted.. Palpation revealed no tenderness or masses, no axillary lymphadenopathy noted.  Lungs-clear to auscultation bilaterally  Cardiac-rate normal rhythm regular no murmurs no JVD  Abdomen-soft nondistended normal active bowel sounds. Palpation revealed no tenderness or masses  Extremities: No peripheral edema.   Pulses 2+ bilaterally   Musculoskeletal:  Wrists-no erythema or swelling, Full range of motion in all directions of motion with no pain. Palpation revealed no tenderness or increase in warmth    Neurologic  Mental status-alert and oriented x3   Cranial nerves-2 through 12 grossly intact, no visual field abnormalities  Motor-no pronator drift noted, strength-5/5 in all muscle groups tested, , no tremor noted.  No bradykinesia noted.  No rigidity noted.  Negative pull test  Sensory-Light touch sensation fully intact  Pinprick sensation fully intact  Vibratory sensation fully intact  Cerebellar-no truncal ataxia, good coordination finger-nose testing,, good coordination heel-to-shin testing, normal rapid alternating movements  Romberg negative, mildly decreased coordination in tandem  gait  Reflexes-upper extremities 2+/4 bilaterally, lower extremities 1+/4 bilaterally  Assessment/Plan   Problem List Items Addressed This Visit           ICD-10-CM    Chronic rhinitis - Primary J31.0    Nasal turbinate hypertrophy J34.3    Vaginal atrophy N95.2    STEF (obstructive sleep apnea) G47.33     Other Visit Diagnoses         Codes      Arthritis of knee     M17.10      Ear itching     L29.9    Relevant Medications    triamcinolone (Kenalog) 0.1 % cream    prednisoLONE sodium phosphate (Inflamase Forte) 1 % ophthalmic solution      Breast screening     Z12.39    Relevant Orders    BI mammo bilateral screening tomosynthesis             Assessment  Chronic infrequent episodes of palpitations-probably secondary to anxiety. Frequent PVCs are a possible etiology as well.  Frequent PVCs  Allergic rhinitis  TVT sling erosion with vaginal bleeding status post removal of mid urethral sling March 20, 2024  Status post hysterectomy in May of 2013 complicated by a wound hematoma and possible infection  Intermittent episodes of dysphagia-May be secondary to esophageal stricture.  Chronic  passage of mucus containing stool-may be secondary to IBS  Incarcerated left intra-umbilical hernia status post laparoscopic repair July 22, 2014  Status post gastric sleeve surgery July 2015  Colonic polyps.  Gluten sensitivity  Chronic intermittent episodes of difficulty emptying the bladder-unsure of etiology  Left renal cyst  Status post cystotomy May 2013.  Urinary tract infections-recurrent  Occasional episodes of urinary incontinence-likely secondary to stress incontinence  Atrophic vaginitis  Hypoactive sexual desire disorder  Occasional episodes of aching pain in both wrists-May be secondary to osteoarthritis  Osteoarthritis of the knees  Meniscal tear the right knee status post arthroscopic surgery November 2008  Torn tendon right ankle status post repair 2015  Vitamin D deficiency  Herpes zoster-recurrent  Iron deficiency  anemia-remote  Obstructive sleep apnea    Chronic poor balance-unsure of etiology. May be secondary to the patient's symptoms of osteoarthritis.   Plan  Obtain EKG and urinalysis today  I will speak with the patient's general surgeon regarding a referral for probable upper endoscopy given the intermittent episodes of dysphagia..  I also referred the patient back to her ear nose and throat specialist for removal of impacted cerumen  I referred the patient for surveillance mammogram  I have prescribed Pred sodium phosphate ophthalmic solution 2 drops to the left ear daily as needed itching and I recommended that the patient apply triamcinolone 0.1% cream twice daily to the opening of the left ear.  I recommend that the patient begin use of vitamin D3 6226-6025 units daily  She will continue all of her other supplements and prescriptive medication and she will return for a complete physical examination in 1 year

## 2025-04-24 ENCOUNTER — APPOINTMENT (OUTPATIENT)
Dept: PRIMARY CARE | Facility: CLINIC | Age: 60
End: 2025-04-24
Payer: COMMERCIAL

## 2025-04-24 VITALS
DIASTOLIC BLOOD PRESSURE: 80 MMHG | SYSTOLIC BLOOD PRESSURE: 100 MMHG | BODY MASS INDEX: 30.78 KG/M2 | WEIGHT: 163 LBS | HEART RATE: 86 BPM | HEIGHT: 61 IN

## 2025-04-24 DIAGNOSIS — L29.9 EAR ITCHING: ICD-10-CM

## 2025-04-24 DIAGNOSIS — J31.0 CHRONIC RHINITIS: Primary | ICD-10-CM

## 2025-04-24 DIAGNOSIS — G47.33 OSA (OBSTRUCTIVE SLEEP APNEA): ICD-10-CM

## 2025-04-24 DIAGNOSIS — M17.10 ARTHRITIS OF KNEE: ICD-10-CM

## 2025-04-24 DIAGNOSIS — N95.2 VAGINAL ATROPHY: ICD-10-CM

## 2025-04-24 DIAGNOSIS — J34.3 NASAL TURBINATE HYPERTROPHY: ICD-10-CM

## 2025-04-24 DIAGNOSIS — Z12.39 BREAST SCREENING: ICD-10-CM

## 2025-04-24 PROCEDURE — 99214 OFFICE O/P EST MOD 30 MIN: CPT | Performed by: INTERNAL MEDICINE

## 2025-04-24 PROCEDURE — 99396 PREV VISIT EST AGE 40-64: CPT | Performed by: INTERNAL MEDICINE

## 2025-04-24 PROCEDURE — 3008F BODY MASS INDEX DOCD: CPT | Performed by: INTERNAL MEDICINE

## 2025-04-24 RX ORDER — PREDNISOLONE SODIUM PHOSPHATE 10 MG/ML
SOLUTION/ DROPS OPHTHALMIC
Qty: 10 ML | Refills: 0 | Status: SHIPPED | OUTPATIENT
Start: 2025-04-24

## 2025-04-24 RX ORDER — MINOXIDIL 2.5 MG/1
2.5 TABLET ORAL DAILY
COMMUNITY

## 2025-04-24 RX ORDER — SEMAGLUTIDE 0.5 MG/0.1
0.5 SYRINGE (ML) SUBCUTANEOUS WEEKLY
COMMUNITY

## 2025-04-24 RX ORDER — TRIAMCINOLONE ACETONIDE 1 MG/G
CREAM TOPICAL 2 TIMES DAILY
Qty: 15 G | Refills: 1 | Status: SHIPPED | OUTPATIENT
Start: 2025-04-24

## 2025-05-02 ENCOUNTER — TELEPHONE (OUTPATIENT)
Dept: OTOLARYNGOLOGY | Facility: HOSPITAL | Age: 60
End: 2025-05-02
Payer: COMMERCIAL

## 2025-05-02 NOTE — TELEPHONE ENCOUNTER
Called patient and left a VM that she was inappropriately scheduled with Dr Delatorre for Monday. She is scheduled for an ear cleaning and should see her ENT she saw last back in 2022 for this. Also gave the phone number for general scheduling if she wanted to reschedule at  with a general ENT doctor. Explained that we are cancelling her appointment on Monday 5/5 with us and for her to reschedule with another doctor for ear cleaning.

## 2025-05-05 ENCOUNTER — APPOINTMENT (OUTPATIENT)
Dept: RADIOLOGY | Facility: CLINIC | Age: 60
End: 2025-05-05
Payer: COMMERCIAL

## 2025-05-05 ENCOUNTER — APPOINTMENT (OUTPATIENT)
Dept: OTOLARYNGOLOGY | Facility: CLINIC | Age: 60
End: 2025-05-05
Payer: COMMERCIAL

## 2025-05-05 VITALS — HEIGHT: 61 IN | WEIGHT: 162.92 LBS | BODY MASS INDEX: 30.76 KG/M2

## 2025-05-05 DIAGNOSIS — Z12.39 BREAST SCREENING: ICD-10-CM

## 2025-05-05 DIAGNOSIS — Z12.31 ENCOUNTER FOR SCREENING MAMMOGRAM FOR MALIGNANT NEOPLASM OF BREAST: ICD-10-CM

## 2025-05-05 PROCEDURE — 77067 SCR MAMMO BI INCL CAD: CPT | Performed by: RADIOLOGY

## 2025-05-05 PROCEDURE — 77063 BREAST TOMOSYNTHESIS BI: CPT | Performed by: RADIOLOGY

## 2025-05-05 PROCEDURE — 77067 SCR MAMMO BI INCL CAD: CPT

## 2025-05-07 ENCOUNTER — TELEPHONE (OUTPATIENT)
Dept: SURGERY | Facility: CLINIC | Age: 60
End: 2025-05-07
Payer: COMMERCIAL

## 2025-05-07 NOTE — TELEPHONE ENCOUNTER
Telephone call to patient to schedule. She is scheduled with Dr. Blount 5/28/25 at 3:30p. She had GBP in 2014. She has had weight loss since Feb 2025 and has been on semaglutide since this time. She has had intermittent dysphagia since 11/2024. Referral from Dr. Leiva.

## 2025-05-19 ENCOUNTER — HOSPITAL ENCOUNTER (OUTPATIENT)
Dept: RADIOLOGY | Facility: CLINIC | Age: 60
Discharge: HOME | End: 2025-05-19
Payer: COMMERCIAL

## 2025-05-19 DIAGNOSIS — R92.8 OTHER ABNORMAL AND INCONCLUSIVE FINDINGS ON DIAGNOSTIC IMAGING OF BREAST: ICD-10-CM

## 2025-05-19 PROCEDURE — 77065 DX MAMMO INCL CAD UNI: CPT | Mod: RT

## 2025-05-19 PROCEDURE — 77065 DX MAMMO INCL CAD UNI: CPT | Mod: RIGHT SIDE | Performed by: STUDENT IN AN ORGANIZED HEALTH CARE EDUCATION/TRAINING PROGRAM

## 2025-05-20 NOTE — PROGRESS NOTES
BARIATRIC SURGERY CLINIC  1 YEAR/ANNUAL FOLLOW UP NOTE    Clinic Date:  5/28/25    Kasey Duarte, 60 y.o.  MRN: [id number]    Date of Surgery: 7/2015   Surgical Procedure: Laparoscopic sleeve gastrectomy  90630    Extra Procedures: None      Initial weight: unknown  Pre-surgical weight: unknown  Today's Visit:   Wt Readings from Last 1 Encounters:   05/05/25 73.9 kg (162 lb 14.7 oz)    There is no height or weight on file to calculate BMI.   Total weight loss:     Surgeon: Darlene Blount MD    PROVIDER IMPRESSIONS Dr. Leiva, PCP 4/24/25: Since November, the patient reports a history of intermittent episodes of dysphagia. She reports that the episodes are precipitated by eating meat, chicken, bulky food. Recently, she has been experiencing pain after drinking water during an episode. She does report that on a few occasions she has regurgitated. Also, since November she reports passage of more mucus in her stools. She reports no other associated symptoms.     Provider Impressions Dr. Nilesh Hylton 5/2023:   57yo F w/ lap incisional infraumbilical hernia repair in 2014 who presents with a 9 month hx of worsening crampy R sided abdominal pain and nausea. Given her reports of similar sx to when she had a hernia in the past, possible hernia felt on exam, as well as her extensive surgical hx, a new abdominal wall hernia or recurrence of prior umbilical hernia are possible. Given her family history of colon cancer, prior polyps seen on colonoscopy, and recent changes in her bowel habits, further colonic pathology causing R sided abdominal pain should be ruled out with colonoscopy. The risks of colonoscopy were discussed with the patient, including bowel perforation, risk of bleeding with biopsies or polypectomy, and the risk of missing lesions due to inadequate bowel prep.   Plan:  # R sided abdominal pain and nausea i/s/o prior ventral hernia repair  -ordered CT A/P w/ contrast to evaluate for new or recurrent hernia   #Hx  of colonic polyps in 2014  -colonoscopy to rule out new colonic pathology causing her sx.     PRESENTING TODAY FOR BARIATRIC POST-OP VISIT:  Visit: -  Self Reports Concerns:  dysphagia with meats, pain when  drinking.    Symptoms:  sleeve in 2015 at [facility] and and weighed 238 lbs at that time.  Lowest weight was 159 lbs.  Today is 160 lbs.  She was higher but has been on semaglutide since February.  Since then she is down 30 lbs.  She is here because she has ongoing concerns.  She ate a sandwich in April and layed down.   She has been told she had a hiatal hernia and had some study showing it but nothing done about.  This time layed down after eating, vomited and it came out of her nose and chunks of meat came through her nose.  Then after that food sticks in mid chest.  [place] not go anywhere without water.   Deals with it by chewing well and eating slow.  Avoids hard food.  Even water sits there. This is affecting her lifestyle and quality of life.  Dry and chunky foods are worse.  Softer foods ok.  No hb unless eats late at night.  Keeps tums at home.  She saw Hylton in 2023 and he fixed her umbilical  hernia laparoscopically.  CT showed the hiatal hernia.  My review shows good portion of sleeve in chest then.     Abdominal pain:  No            Constipation: No    Diarrhea: No    Dumping Syndrome:  No    Experiencing hunger: No    Food Intolerances: No    Nausea/vomiting: Yes    Reflux: Yes   Are you on medications: No      Diet History:     Carbonated Beverages: No          Caffeinated Beverages: Yes    Time to eat meals: 30    Fluid intake: 64 oz/day      Protein Intake:  unsure grams/day    Breakfast: yogurt    Lunch: george salad with chili    Dinner: nothing     Snacks: non     EXERCISE:  No          GERD - Health Related Quality of Life Questionnaire (GERD- HRQL)  Off PPI for  (how long)    How bad is the heartburn? 0 = No symptoms  Heartburn when lying down? 0 = No symptoms  Heartburn when standing up? 0 =  No symptoms  Heartburn after meals? 0 = No symptoms  Does heartburn change your diet? 0 = No symptoms  Does heartburn wake you from sleep? 0 = No symptoms    Do you have difficulty swallowing? 0 = No symptoms  Do you have pain with swallowing? 0 = No symptoms  If you take medication, does this affect your daily life? 0 = No symptoms    How bad is the regurgitation? 0 = No symptoms  Regurgitation when lying down? 0 = No symptoms  Regurgitation when standing up? 0 = No symptoms  Regurgitation after meals? 0 = No symptoms  Does regurgitation change your diet? 0 = No symptoms  Does regurgitation wake you from sleep? 0 = No symptoms    How satisfied are you with your present condition? Neutral    Total score (calculated by summing the individual scores of questions 1-15): 0   Greatest possible score 75 (worst symptoms).   Lowest possible score 0 (no symptoms).    Heartburn score (calculated by summing the individual scores of questions 1-6): 0   Worst heartburn symptoms: 30.   No heartburn symptoms: 0.   Score less than or equal to 12 with each individual question not exceeding 2 indicate heartburn elimination.    Regurgitation score (calculated by summing the individual scores of questions 10-15): 0   Worst regurgitation symptoms: 30.   No regurgitation symptoms: 0.   Score less than or equal to 12 with each individual question not exceeding 2 indicate regurgitation elimination.       CT abd pelvis 6/2023 Dr. Hylton:   Show images for CT abdomen pelvis w IV contrast   INDICATION:  abdominal hernia  K46.9: Abdominal hernia.     Per clinical notes: Patient has a history of incisional  infraumbilical hernia repair in 2014, presents with right-sided  abdominal pain and nausea, similar to the symptoms she had prior to  hernia repair.  FINDINGS:  LOWER CHEST: no acute abnormality.     ABDOMEN/PELVIS:     ABDOMINAL WALL: Postsurgical changes of abdominal wall hernia repair  with mesh is noted. There is no definitive evidence of  defect in the  rectus abdominus fascia to suggest recurrent hernia.     LIVER: Diffuse hypoattenuation of the liver, consistent with  steatosis. No focal lesions.     BILE DUCTS: No significant intrahepatic or extrahepatic dilatation.     GALLBLADDER: No significant abnormality.     SPLEEN: No significant abnormality.     PANCREAS: No significant abnormality.     ADRENALS: No significant abnormality.     KIDNEYS, URETERS, BLADDER: Approximate 3.1 cm cyst in the lower pole  of the left kidney.4 mm left renal stone redemonstrated. No  hydronephrosis or hydroureter. The bladder wall is normal thickness  for degree of distention.     REPRODUCTIVE ORGANS: The uterus is present. No suspicious adnexal  lesions.     VESSELS: No significant abnormality.     RETROPERITONEUM/LYMPH NODES: No enlarged lymph nodes.     BOWEL/MESENTERY/PERITONEUM:There is a moderate-sized paraesophageal  hiatal hernia with associated thickening of the distal thoracic  esophagus and the herniated stomach cardia, overall similar to prior  examination on 01/26/2017. Postsurgical changes of sleeve gastrectomy  is noted. The remainder of the bowel loops are normal in course and  caliber. No inflammatory bowel wall thickening or dilatation. The  appendix is not definitively visualized, however, there are no  secondary signs of appendicitis.     No ascites, free air, or fluid collection.     MUSCULOSKELETAL: No acute osseous abnormality. Minimal degenerative  changes of lumbar spine noted.      Impression   1. No acute abnormality within the abdomen or pelvis.  2. Postsurgical changes of anterior abdominal hernia repair with  mesh. No definitive evidence of recurrent hernia or bowel obstruction  noted.  3. Postsurgical changes of sleeve gastrectomy with unchanged moderate  paraesophageal hiatal hernia and thickening of the distal thoracic  esophagus as well as herniated stomach. Correlate with  gastroesophageal reflux. Correlation with endoscopy  suggested for  further assessment.  4.  Diffuse hepatic steatosis. Correlate with LFTs.  5.  Additional findings as above.      Colonoscopy 6/2023:  Findings:       The perianal and digital rectal examinations were normal.       A 5 mm polyp was found in the sigmoid colon. The polyp was sessile. The        polyp was removed with a cold snare. Resection and retrieval were        complete. Verification of patient identification for the specimen was        done. Estimated blood loss was minimal.       The exam was otherwise without abnormality.       The retroflexed view of the distal rectum and anal verge was normal and        showed no anal or rectal abnormalities.  Moderate Sedation:       Moderate (conscious) sedation was administered by the nurse and        supervised by the endoscopist. The following parameters were monitored:        oxygen saturation, heart rate, blood pressure, and response to care.        Total physician intraservice time was 32 minutes.  Impression:            - One 5 mm polyp in the sigmoid colon, removed with a                          cold snare. Resected and retrieved.                         - The examination was otherwise normal.                         - The distal rectum and anal verge are normal on                          retroflexion view.  Last Visit:    Wt Readings from Last 3 Encounters:   05/05/25 73.9 kg (162 lb 14.7 oz)   04/24/25 73.9 kg (163 lb)   03/07/25 81.2 kg (179 lb)        Current Medications[1]    Comorbidities:  No problem-specific Assessment & Plan notes found for this encounter.      DAILY SUPPLEMENTS:  Calcium: Calcium Citrate w/ vitamin D (1200 - 1500mg)  Multivitamin & Minerals: 2 per day  Iron Supplement: included in multi-vitamin  Vitamin B12: 1000 mcg   Vitamin D3: 3000 units    REVIEW OF SYSTEMS:  CONSTITUTIONAL: Patient denies fevers, chills, sweats and weight changes.  EYES: Patient denies any visual symptoms.  EARS, NOSE, AND THROAT: No difficulties  with hearing. No symptoms of rhinitis or sore throat.  CARDIOVASCULAR: Patient denies chest pains, palpitations, orthopnea and paroxysmal nocturnal dyspnea.  RESPIRATORY: No dyspnea on exertion, no wheezing or cough.  GI: No nausea, vomiting, diarrhea, constipation, abdominal pain, hematochezia or melena.  : No urinary hesitancy or dribbling. No nocturia or urinary frequency. No abnormal urethral discharge.  MUSCULOSKELETAL: No myalgias or arthralgias.  NEUROLOGIC: No chronic headaches, no seizures. Patient denies numbness, tingling or weakness.  PSYCHIATRIC: Patient denies problems with mood disturbance. No problems with anxiety.  ENDOCRINE: No excessive urination or excessive thirst.  DERMATOLOGIC: Patient denies any rashes or skin changes.    PHYSICAL EXAM:  There were no vitals taken for this visit. There is no height or weight on file to calculate BMI.  GENERAL: Obese. No apparent distress. Pt is alert and oriented x3.  HEENT: Head is normocephalic and atraumatic. Extraocular muscles are intact. Pupils are equal, round, and reactive to light and accommodation. Nares appeared normal. Mouth is well hydrated and without lesions. Mucous membranes are moist. Posterior pharynx clear of any exudate or lesions.  NECK: Supple. No carotid bruits. No lymphadenopathy or thyromegaly.  LUNGS: Clear to auscultation.  HEART: Regular rate and rhythm without murmur.  ABDOMEN: Soft, nontender, and nondistended. Positive bowel sounds. No hepatosplenomegaly was noted.  EXTREMITIES: Without any cyanosis, clubbing, rash, lesions or edema.  NEUROLOGIC: Cranial nerves II through XII are grossly intact.  PSYCHIATRIC: Flat affect, but denies suicidal or homicidal ideations.  SKIN: No ulceration or induration present.      TODAY'S PROVIDER VISIT IMPRESSIONS: Sleeve in 2015 at [facility] and and weighed 238 lbs at that time.  Lowest weight was 159 lbs.  Today is 160 lbs.  She was higher but has been on semaglutide since February.  Since  then she is down 30 lbs.  She is here because she has ongoing concerns.  She ate a sandwich in April and layed down.   She has been told she had a hiatal hernia and had some study showing it but nothing done about.  This time layed down after eating, vomited and it came out of her nose and chunks of meat came through her nose.  Then after that food sticks in mid chest.  [place] not go anywhere without water.   Deals with it by chewing well and eating slow.  Avoids hard food.  Even water sits there. This is affecting her lifestyle and quality of life.  Dry and chunky foods are worse.  Softer foods ok.  No hb unless eats late at night.  Keeps tums at home.  She saw Hylton in 2023 and he fixed her umbilical  hernia laparoscopically.  CT showed the hiatal hernia.  My review shows good portion of sleeve in chest then.  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  We will do an upper endoscopy and we will do an upper GI study.  Counseled on diet and exercise.  We discussed once we do the tests then can determine if hiatal hernia repair needed.     Plan:  We will for an endoscopy  We will get imaging    Follow the pouch rules:    - - Eat 5 small meals per day (3 meals and 2 snacks)  - Take in at least 60 g protein daily (try to get through lean meats, dairy, legumes)  - Eat 5 vegetables per day  - No sweets, fruits are fine.  Berries and citrus are lower glycemic index fruits  -Limit rice, bread, pasta and potatoes.  These should only be consumed after having your protein and vegetables  - Drink at least 60 oz fluid daily, (non caffeinated, no carbonated beverages, sugar free)  - Get 60 minutes of exercise daily   Follow up after the studies.   Please have your yearly lab work done (if you have not already) - We will call you with any abnormal lab results.    Be sure to continue with exercise, goal should be 3-5 times a week 60 minutes at a time.    Increase variety and  intensity of your work out routine.    Make sure you are taking your multivitamin, B12 and calcium.    See the Dietician as needed.    Dr. Darlene Blount M.D., MPH  Director of Bariatric & Minimally Invasive Surgery  [address] [address]  T:  [phone]  F:  [phone]     Shannon Zavaleta, RN Assistant Nurse Manager, Care Coordinator Patient Nursing Contact  T: [phone]  F: [phone]                      [1]   Current Outpatient Medications   Medication Sig Dispense Refill    calcium-magnesium-zinc 333-133-5 mg tablet Take 1 tablet by mouth once daily.      Intrarosa 6.5 mg insert INSERT 1 INSERT VAGINALLY EVERY NIGHT AT BEDTIME 28 each 3    minoxidil (Loniten) 2.5 mg tablet Take 1 tablet (2.5 mg) by mouth once daily.      prednisoLONE sodium phosphate (Inflamase Forte) 1 % ophthalmic solution Instill 2 drops into the left ear daily 10 mL 0    semaglutide 0.5 mg/0.1 mL syringe Inject 0.5 mg under the skin 1 (one) time per week.      triamcinolone (Kenalog) 0.1 % cream Apply topically 2 times a day. Apply to affected area 1-2 times daily as needed. 15 g 1     No current facility-administered medications for this visit.

## 2025-05-28 ENCOUNTER — OFFICE VISIT (OUTPATIENT)
Dept: SURGERY | Facility: CLINIC | Age: 60
End: 2025-05-28
Payer: COMMERCIAL

## 2025-05-28 VITALS
BODY MASS INDEX: 30.25 KG/M2 | WEIGHT: 160 LBS | DIASTOLIC BLOOD PRESSURE: 75 MMHG | HEART RATE: 96 BPM | SYSTOLIC BLOOD PRESSURE: 117 MMHG

## 2025-05-28 DIAGNOSIS — R13.19 ESOPHAGEAL DYSPHAGIA: ICD-10-CM

## 2025-05-28 DIAGNOSIS — Z98.84 BARIATRIC SURGERY STATUS: ICD-10-CM

## 2025-05-28 DIAGNOSIS — Z13.21 ENCOUNTER FOR VITAMIN DEFICIENCY SCREENING: ICD-10-CM

## 2025-05-28 DIAGNOSIS — Z98.84 STATUS POST BARIATRIC SURGERY: ICD-10-CM

## 2025-05-28 DIAGNOSIS — K44.9 HIATAL HERNIA: Primary | ICD-10-CM

## 2025-05-28 PROCEDURE — 99205 OFFICE O/P NEW HI 60 MIN: CPT | Performed by: SURGERY

## 2025-05-28 PROCEDURE — 99215 OFFICE O/P EST HI 40 MIN: CPT | Performed by: SURGERY

## 2025-05-28 NOTE — PATIENT INSTRUCTIONS
Plan:  We will for an endoscopy  We will get imaging    Follow the pouch rules:    - - Eat 5 small meals per day (3 meals and 2 snacks)  - Take in at least 60 g protein daily (try to get through lean meats, dairy, legumes)  - Eat 5 vegetables per day  - No sweets, fruits are fine.  Berries and citrus are lower glycemic index fruits  -Limit rice, bread, pasta and potatoes.  These should only be consumed after having your protein and vegetables  - Drink at least 60 oz fluid daily, (non caffeinated, no carbonated beverages, sugar free)  - Get 60 minutes of exercise daily   Follow up after the studies.   Please have your yearly lab work done (if you have not already) - We will call you with any abnormal lab results.    Be sure to continue with exercise, goal should be 3-5 times a week 60 minutes at a time.    Increase variety and intensity of your work out routine.    Make sure you are taking your multivitamin, B12 and calcium.    See the Dietician as needed.    Dr. Darlene Blount M.D., MPH  Director of Bariatric & Minimally Invasive Surgery  [place] [address]  T:  [phone]  F:  [phone]     Shannon Zavaleta, RN Assistant Nurse Manager, Care Coordinator Patient Nursing Contact  T: [phone]  F: [phone]

## 2025-05-29 DIAGNOSIS — Z98.84 STATUS POST BARIATRIC SURGERY: ICD-10-CM

## 2025-05-29 DIAGNOSIS — R13.19 ESOPHAGEAL DYSPHAGIA: ICD-10-CM

## 2025-05-29 NOTE — PROGRESS NOTES
Kasey Duarte female   1965 60 y.o.   [id number]      Chief Complaint    New Patient Visit          HPI  Kasey Duarte is a 60 y.o. AA female [facility] by Sant[facility]. She denies breast surgery or biopsy. She has bilateral nipple piercing. She has no family history of breast or ovarian cancer.     BREAST IMAGIN2025 bilateral screening mammogram at [facility], BI-RADS Category 0, right breast calcifications in the inferior medial breast warranting additional views.  2025 right diagnostic mammogram, BI-RADS Category 4, right breast indeterminate calcifications in the superior medial breast at anterior depth warranting stereotactic core biopsy, there are at least 2 additional groupings of calcifications in the superior central and superior lateral breast probably benign.  Biopsy concerning group of calcifications warranted.  If biopsy demonstrates benign findings, short-term follow-up diagnostic mammogram in 6 months is recommended.  If biopsy demonstrates malignant findings additional biopsies should be performed.    REPRODUCTIVE HISTORY: menarche age 11, , first birth age 24, , OCPs, hysterectomy menopause age unknown due to hysterectomy, no HRT, scattered tissue    FAMILY CANCER HISTORY:   Father: prostate cancer  Sister: throat cancer  Daughter: tonsil cancer  Maternal grandmother: stomach cancer  Paternal grandfather: lymphoma    REVIEW OF SYSTEMS  Review of Systems   Constitutional: Negative.    HENT:  Negative.     Eyes: Negative.    Respiratory: Negative.     Cardiovascular: Negative.    Gastrointestinal: Negative.    Endocrine: Negative.    Genitourinary: Negative.     Musculoskeletal: Negative.    Skin: Negative.    Neurological: Negative.    Hematological: Negative.              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    Chronic rhinitis 10/03/2023    Bilateral impacted cerumen 10/03/2023    Decreased sex drive 10/03/2023    Dysuria 10/03/2023    Hypoactive sexual desire disorder 10/03/2023    Myopia of both eyes with astigmatism and presbyopia 10/03/2023    Nasal turbinate hypertrophy 10/03/2023    Pyelonephritis 10/03/2023    Rhinorrhea 10/03/2023    Vaginal atrophy 10/03/2023    Vitamin D deficiency 10/03/2023    History of hysterectomy, supracervical 10/03/2023    Erosion of implanted vaginal mesh and prosthetic materials 02/22/2024     Medical History[2]   Surgical History[3]   Family History[4]       SOCIAL HISTORY      Social History     Tobacco Use    Smoking status: Never     Passive exposure: Never    Smokeless tobacco: Never   Substance Use Topics    Alcohol use: Yes     Alcohol/week: 4.0 standard drinks of alcohol     Types: 4 Glasses of wine per week     Comment: 4 glasses of wine a week per pt        VITALS  Vitals:    06/02/25 1046   BP: 110/66   Pulse: 63   Resp: 16   Temp: 36.6 °C (97.9 °F)   SpO2: 99%        PHYSICAL EXAM  Patient is alert and oriented x3, with appropriate mood. The gait is steady and hand grasps are equal. Sclera clear. The breasts are nearly symmetrical. The tissue is soft without palpable abnormalities, discrete nodules or masses. The skin and nipples appear normal with bilateral piercing. There is no cervical, supraclavicular, or axillary lymphadenopathy palpable. Heart rate and rhythm normal, S1 and S2 appreciated. The lungs are clear bilaterally.     Physical Exam     IMAGING    Time was spent viewing digital images of the  "radiology testing with the patient.       ORDERS  Right stereotactic core biopsy       ASSESSMENT/PLAN  1. Breast calcification, right        2. Abnormal finding on breast imaging               Follow up for is to proceed to biopsy.  The breast radiology physician will perform the biopsy. Results are typically available in about 7-10 business days and directly placed in patient's MyChart. I will call with results once I have reviewed them and instruct on next steps and referrals if warranted.           Keerthi Sepulveda, APRN-[facility]         [1]   Allergies  Allergen Reactions    Nsaids (Non-Steroidal Anti-Inflammatory Drug) Hives    Penicillins Anaphylaxis    Propoxyphene N-Acetaminophen Other     Violent migraine per pt    \"Darvocet\"   [2]   Past Medical History:  Diagnosis Date    Arthropathy, unspecified 2018    Arthropathy    Hypoactive sexual desire disorder 2020    Hypoactive sexual desire disorder    Postmenopausal atrophic vaginitis 2020    Vaginal atrophy    Status post abdominal supracervical subtotal hysterectomy    [3]   Past Surgical History:  Procedure Laterality Date     SECTION, CLASSIC  2014     Section    HYSTERECTOMY  2014    Open supracervical hy w/ Dr. Tanner     OTHER SURGICAL HISTORY  2020    Gastric bypass surgery    OTHER SURGICAL HISTORY  2014    Arthrotomy Of Knee    OTHER SURGICAL HISTORY  2014    Laparoscopic Sling Operation For Stress Incontinence    OTHER SURGICAL HISTORY  2014    Foot Surgery Left    OTHER SURGICAL HISTORY  2014    Ovarian Cystectomy Right    TONSILLECTOMY  2014    Tonsillectomy    UMBILICAL HERNIA REPAIR  2014    with mesh   [4]   Family History  Problem Relation Name Age of Onset    Coronary artery disease Mother      Diabetes Mother      Hypertension Mother          essential    Kidney disease Mother      Other (myocardial infarction) " Mother      Diabetes type II Mother      Prostate cancer Father      Coronary artery disease Father      Hypertension Father          essential    Stroke Father      Hypertension Sister          essential    Throat cancer Sister      Hypertension Brother Isai 20 - 29    Hypertension Daughter      Other (malignant neoplasm of tonsil) Daughter      Diabetes Maternal Grandmother      Other (malignant neoplasm of stomach) Maternal Grandmother      Lymphoma Paternal Grandfather

## 2025-05-31 LAB
25(OH)D3+25(OH)D2 SERPL-MCNC: 24 NG/ML (ref 30–100)
BASOPHILS # BLD AUTO: 30 CELLS/UL (ref 0–200)
BASOPHILS NFR BLD AUTO: 0.5 %
CHOLEST SERPL-MCNC: 188 MG/DL
CHOLEST/HDLC SERPL: 3.3 (CALC)
COPPER BLD-MCNC: NORMAL UG/DL
EOSINOPHIL # BLD AUTO: 102 CELLS/UL (ref 15–500)
EOSINOPHIL NFR BLD AUTO: 1.7 %
ERYTHROCYTE [DISTWIDTH] IN BLOOD BY AUTOMATED COUNT: 14.5 % (ref 11–15)
FERRITIN SERPL-MCNC: 75 NG/ML (ref 16–288)
FOLATE SERPL-MCNC: 10.4 NG/ML
HCT VFR BLD AUTO: 41.2 % (ref 35–45)
HDLC SERPL-MCNC: 57 MG/DL
HGB BLD-MCNC: 13.1 G/DL (ref 11.7–15.5)
IRON SATN MFR SERPL: 7 % (CALC) (ref 16–45)
IRON SERPL-MCNC: 21 MCG/DL (ref 45–160)
LDLC SERPL CALC-MCNC: 114 MG/DL (CALC)
LYMPHOCYTES # BLD AUTO: 1074 CELLS/UL (ref 850–3900)
LYMPHOCYTES NFR BLD AUTO: 17.9 %
MCH RBC QN AUTO: 26.6 PG (ref 27–33)
MCHC RBC AUTO-ENTMCNC: 31.8 G/DL (ref 32–36)
MCV RBC AUTO: 83.6 FL (ref 80–100)
MONOCYTES # BLD AUTO: 708 CELLS/UL (ref 200–950)
MONOCYTES NFR BLD AUTO: 11.8 %
NEUTROPHILS # BLD AUTO: 4086 CELLS/UL (ref 1500–7800)
NEUTROPHILS NFR BLD AUTO: 68.1 %
NONHDLC SERPL-MCNC: 131 MG/DL (CALC)
PLATELET # BLD AUTO: 289 THOUSAND/UL (ref 140–400)
PMV BLD REES-ECKER: 9.7 FL (ref 7.5–12.5)
PTH-INTACT SERPL-MCNC: 49 PG/ML (ref 16–77)
RBC # BLD AUTO: 4.93 MILLION/UL (ref 3.8–5.1)
TIBC SERPL-MCNC: 293 MCG/DL (CALC) (ref 250–450)
TRIGL SERPL-MCNC: 71 MG/DL
VIT A SERPL-MCNC: NORMAL UG/ML
VIT B1 BLD-SCNC: NORMAL NMOL/L
VIT B12 SERPL-MCNC: 354 PG/ML (ref 200–1100)
WBC # BLD AUTO: 6 THOUSAND/UL (ref 3.8–10.8)
ZINC SERPL-MCNC: NORMAL UG/ML

## 2025-06-02 ENCOUNTER — HOSPITAL ENCOUNTER (OUTPATIENT)
Dept: RADIOLOGY | Facility: CLINIC | Age: 60
Discharge: HOME | End: 2025-06-02
Payer: COMMERCIAL

## 2025-06-02 ENCOUNTER — PROCEDURE VISIT (OUTPATIENT)
Dept: SURGICAL ONCOLOGY | Facility: CLINIC | Age: 60
End: 2025-06-02
Payer: COMMERCIAL

## 2025-06-02 VITALS
OXYGEN SATURATION: 99 % | WEIGHT: 152.8 LBS | TEMPERATURE: 97.9 F | BODY MASS INDEX: 28.89 KG/M2 | SYSTOLIC BLOOD PRESSURE: 110 MMHG | HEART RATE: 63 BPM | RESPIRATION RATE: 16 BRPM | DIASTOLIC BLOOD PRESSURE: 66 MMHG

## 2025-06-02 DIAGNOSIS — R92.1 BREAST CALCIFICATION, RIGHT: ICD-10-CM

## 2025-06-02 DIAGNOSIS — R92.8 OTHER ABNORMAL AND INCONCLUSIVE FINDINGS ON DIAGNOSTIC IMAGING OF BREAST: ICD-10-CM

## 2025-06-02 DIAGNOSIS — R92.1 BREAST CALCIFICATION, RIGHT: Primary | ICD-10-CM

## 2025-06-02 DIAGNOSIS — R92.8 ABNORMAL FINDING ON BREAST IMAGING: ICD-10-CM

## 2025-06-02 PROCEDURE — C1739 HC OR 278 NO HCPCS: HCPCS

## 2025-06-02 PROCEDURE — 2500000004 HC RX 250 GENERAL PHARMACY W/ HCPCS (ALT 636 FOR OP/ED): Performed by: RADIOLOGY

## 2025-06-02 PROCEDURE — 2780000003 HC OR 278 NO HCPCS

## 2025-06-02 PROCEDURE — 2720000007 HC OR 272 NO HCPCS

## 2025-06-02 PROCEDURE — 99214 OFFICE O/P EST MOD 30 MIN: CPT | Performed by: NURSE PRACTITIONER

## 2025-06-02 PROCEDURE — 19081 BX BREAST 1ST LESION STRTCTC: CPT | Mod: RT

## 2025-06-02 PROCEDURE — 19081 BX BREAST 1ST LESION STRTCTC: CPT | Mod: RIGHT SIDE | Performed by: RADIOLOGY

## 2025-06-02 PROCEDURE — 0761T DGTZ GLS MCRSCP SL IMM EA 1: CPT | Mod: TC,AHULAB | Performed by: NURSE PRACTITIONER

## 2025-06-02 PROCEDURE — 77065 DX MAMMO INCL CAD UNI: CPT | Mod: RIGHT SIDE | Performed by: RADIOLOGY

## 2025-06-02 RX ADMIN — Medication 10 ML: at 11:22

## 2025-06-02 ASSESSMENT — PATIENT HEALTH QUESTIONNAIRE - PHQ9
SUM OF ALL RESPONSES TO PHQ9 QUESTIONS 1 & 2: 0
1. LITTLE INTEREST OR PLEASURE IN DOING THINGS: NOT AT ALL
2. FEELING DOWN, DEPRESSED OR HOPELESS: NOT AT ALL

## 2025-06-02 ASSESSMENT — ENCOUNTER SYMPTOMS
CONSTITUTIONAL NEGATIVE: 1
NEUROLOGICAL NEGATIVE: 1
OCCASIONAL FEELINGS OF UNSTEADINESS: 0
CARDIOVASCULAR NEGATIVE: 1
DEPRESSION: 0
RESPIRATORY NEGATIVE: 1
ENDOCRINE NEGATIVE: 1
EYES NEGATIVE: 1
LOSS OF SENSATION IN FEET: 0
MUSCULOSKELETAL NEGATIVE: 1
GASTROINTESTINAL NEGATIVE: 1
HEMATOLOGIC/LYMPHATIC NEGATIVE: 1

## 2025-06-02 ASSESSMENT — PAIN SCALES - GENERAL
PAINLEVEL_OUTOF10: 1
PAINLEVEL_OUTOF10: 0-NO PAIN
PAINLEVEL_OUTOF10: 0 - NO PAIN
PAINLEVEL_OUTOF10: 1

## 2025-06-02 NOTE — PATIENT INSTRUCTIONS
Thank you for coming to see me today at [facility]. I recommend biopsy. A breast radiology physician will perform the biopsy. Possible diagnoses include benign, atypical, or cancer as we discussed.  Bruising and mild discomfort after the biopsy is normal and will improve. I normally have results in 7-10 business days. I will call you with results, please have your phone handy to take my call.    IMPORTANT INFORMATION REGARDING YOUR RESULTS  If you receive medical information from My [facility] Personal Health Record (online chart) your results will be released into your chart. This means you may view or see results of your biopsy or procedure before I contact you directly. If this occurs, please call the office and we will discuss your results over the phone.     You can see your health information, review clinical summaries from office visits & test results online when you follow your health with MY  Chart, a personal health record. To sign up go to [email or web address]. If you need assistance with signing up or trouble getting into your account call [facility] Patient Line 24/7 at [phone].     Should you have any questions or concerns after biopsy, please do not hesitate to call my office at [phone]. If it has been more than a week since your biopsy was performed and you have not been given the results, please call my office [phone]. Thank you for choosing [facility] and trusting me as your healthcare provider. I am honored to be a provider on your health care team and I remain dedicated to helping you achieve your health goals.

## 2025-06-02 NOTE — DISCHARGE INSTRUCTIONS
AFTER THE TEST  A steri-strip and bandage will be placed over the incision. You may shower after 24 hours. Remove bandage after 24 hours. Remove bandage after the shower. Leave the steri-strips in place to fall off on their own. If after 1 week the steri-strips are still on, you may remove them. Avoid swimming or soaking in tub for 3 days.     You may have mild discomfort at the test site. If needed, you may take Tylenol (Acetaminophen) for pain. Please avoid taking NSAIDs, Motrin, Advil, Aleve, or ibuprofen for 24 hours following the biopsy. After 24 hours you may resume NSAIDSs.     If you take aspirin, Plavix, Coumadin, Xarelto or Eliquis please tell us. If these medications were stopped by your provider, please ask them when to resume.     You may have some tenderness, bruising or slight bleeding at the site. Please apply ice packs to the site for 15 minutes on and 15 minutes off for a 2 hour minimum.     Most people can return to their usual routine after the procedure. Avoid Strenuous activity for 24 hours.     Sleep in a bra the night after your biopsy. Continue to do so for comfort.     Call your provider if you have any of the following symptoms :  Fever  Increased pain  Increased bleeding  Redness  Increased swelling  Yellowish drainage  Your provider will get the biopsy results within 5 - 7 days. Call your provider with any questions.     Patient education brochure and pain/comfort measures have been reviewed.   Phone number provided to contact Breast Center if problems arise.     Patient verbalized understanding of home going instructions.  Patient left breast center ambulatory at 1205.

## 2025-06-04 ENCOUNTER — APPOINTMENT (OUTPATIENT)
Facility: CLINIC | Age: 60
End: 2025-06-04
Payer: COMMERCIAL

## 2025-06-04 ENCOUNTER — TELEPHONE (OUTPATIENT)
Dept: SURGERY | Facility: CLINIC | Age: 60
End: 2025-06-04

## 2025-06-04 DIAGNOSIS — Z01.818 PREOPERATIVE CLEARANCE: ICD-10-CM

## 2025-06-04 DIAGNOSIS — E55.9 VITAMIN D INSUFFICIENCY: ICD-10-CM

## 2025-06-04 DIAGNOSIS — E66.01 MORBID OBESITY (MULTI): ICD-10-CM

## 2025-06-04 DIAGNOSIS — Z98.84 HISTORY OF BARIATRIC SURGERY: ICD-10-CM

## 2025-06-04 DIAGNOSIS — Z98.84 BARIATRIC SURGERY STATUS: ICD-10-CM

## 2025-06-04 DIAGNOSIS — E61.1 IRON DEFICIENCY: ICD-10-CM

## 2025-06-04 DIAGNOSIS — Z13.21 ENCOUNTER FOR VITAMIN DEFICIENCY SCREENING: ICD-10-CM

## 2025-06-04 RX ORDER — ASCORBIC ACID 250 MG
250 TABLET ORAL
Qty: 30 TABLET | Refills: 2 | Status: SHIPPED | OUTPATIENT
Start: 2025-06-04 | End: 2025-09-02

## 2025-06-04 RX ORDER — CHOLECALCIFEROL (VITAMIN D3) 50 MCG
50 TABLET ORAL DAILY
Qty: 30 TABLET | Refills: 2 | Status: SHIPPED | OUTPATIENT
Start: 2025-06-04 | End: 2025-09-02

## 2025-06-04 RX ORDER — FERROUS SULFATE 325(65) MG
65 TABLET ORAL
Qty: 30 TABLET | Refills: 2 | Status: SHIPPED | OUTPATIENT
Start: 2025-06-04 | End: 2025-09-02

## 2025-06-05 LAB
25(OH)D3+25(OH)D2 SERPL-MCNC: 24 NG/ML (ref 30–100)
BASOPHILS # BLD AUTO: 30 CELLS/UL (ref 0–200)
BASOPHILS NFR BLD AUTO: 0.5 %
CHOLEST SERPL-MCNC: 188 MG/DL
CHOLEST/HDLC SERPL: 3.3 (CALC)
COPPER BLD-MCNC: 100 MCG/DL
EOSINOPHIL # BLD AUTO: 102 CELLS/UL (ref 15–500)
EOSINOPHIL NFR BLD AUTO: 1.7 %
ERYTHROCYTE [DISTWIDTH] IN BLOOD BY AUTOMATED COUNT: 14.5 % (ref 11–15)
FERRITIN SERPL-MCNC: 75 NG/ML (ref 16–288)
FOLATE SERPL-MCNC: 10.4 NG/ML
HCT VFR BLD AUTO: 41.2 % (ref 35–45)
HDLC SERPL-MCNC: 57 MG/DL
HGB BLD-MCNC: 13.1 G/DL (ref 11.7–15.5)
IRON SATN MFR SERPL: 7 % (CALC) (ref 16–45)
IRON SERPL-MCNC: 21 MCG/DL (ref 45–160)
LDLC SERPL CALC-MCNC: 114 MG/DL (CALC)
LYMPHOCYTES # BLD AUTO: 1074 CELLS/UL (ref 850–3900)
LYMPHOCYTES NFR BLD AUTO: 17.9 %
MCH RBC QN AUTO: 26.6 PG (ref 27–33)
MCHC RBC AUTO-ENTMCNC: 31.8 G/DL (ref 32–36)
MCV RBC AUTO: 83.6 FL (ref 80–100)
MONOCYTES # BLD AUTO: 708 CELLS/UL (ref 200–950)
MONOCYTES NFR BLD AUTO: 11.8 %
NEUTROPHILS # BLD AUTO: 4086 CELLS/UL (ref 1500–7800)
NEUTROPHILS NFR BLD AUTO: 68.1 %
NONHDLC SERPL-MCNC: 131 MG/DL (CALC)
PLATELET # BLD AUTO: 289 THOUSAND/UL (ref 140–400)
PMV BLD REES-ECKER: 9.7 FL (ref 7.5–12.5)
PTH-INTACT SERPL-MCNC: 49 PG/ML (ref 16–77)
RBC # BLD AUTO: 4.93 MILLION/UL (ref 3.8–5.1)
TIBC SERPL-MCNC: 293 MCG/DL (CALC) (ref 250–450)
TRIGL SERPL-MCNC: 71 MG/DL
VIT A SERPL-MCNC: 33 MCG/DL (ref 38–98)
VIT B1 BLD-SCNC: NORMAL NMOL/L
VIT B12 SERPL-MCNC: 354 PG/ML (ref 200–1100)
WBC # BLD AUTO: 6 THOUSAND/UL (ref 3.8–10.8)
ZINC SERPL-MCNC: 60 MCG/DL (ref 60–130)

## 2025-06-06 ENCOUNTER — TELEPHONE (OUTPATIENT)
Dept: SURGICAL ONCOLOGY | Facility: CLINIC | Age: 60
End: 2025-06-06
Payer: COMMERCIAL

## 2025-06-06 LAB
LAB AP ASR DISCLAIMER: NORMAL
LAB AP BLOCK FOR ADDITIONAL STUDIES: NORMAL
LABORATORY COMMENT REPORT: NORMAL
PATH REPORT.FINAL DX SPEC: NORMAL
PATH REPORT.GROSS SPEC: NORMAL
PATH REPORT.RELEVANT HX SPEC: NORMAL
PATH REPORT.TOTAL CANCER: NORMAL
PATHOLOGY SYNOPTIC REPORT: NORMAL

## 2025-06-06 NOTE — TELEPHONE ENCOUNTER
Attempted to contact patient for scheduling surgical consultation however received voicemail. Unable to leave message as mailbox is full and not accepting messages. Ovuline message sent to patient.

## 2025-06-06 NOTE — TELEPHONE ENCOUNTER
Result Communication    Resulted Orders   Surgical Pathology Exam   Result Value Ref Range    Case Report       Surgical Pathology                                Case: [id number]                                  Authorizing Provider:  QUINTON Gallo  Collected:           06/02/2025 1128              Ordering Location:     [facility]       Received:            06/02/2025 1158                                     Center                                                                       Pathologist:           Nickie Fairchild MD PhD                                                               Specimen:    BREAST CORE BIOPSY RIGHT, Right breast calcifications                                      FINAL DIAGNOSIS       A. BREAST CALCIFICATIONS, RIGHT, UPPER INNER QUADRANT, CORE BIOPSY:      -- Ductal carcinoma in situ, intermediate to high nuclear grade, solid, cribriform, papillary and micropapillary patterns with comedonecrosis and microcalcifications, see note and synoptic biomarker summary.    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              By the signature on this report, the individual or group listed as making the Final Interpretation/Diagnosis certifies that they have reviewed this case.       Case Summary Report       Breast Biomarker Reporting Template   BREAST BIOMARKER REPORTING TEMPLATE - A   Protocol posted: 12/13/2023         Test(s) Performed:            Estrogen Receptor (ER) Status:    Positive (greater than 10% of cells demonstrate nuclear positivity)           Percentage of Cells with Nuclear Positivity:    %           Average Intensity of Staining:    Moderate         Test Type:    Food and Drug Administration (FDA) cleared (test / vendor): Roche CONFIRM anti-(ER) (SP1) Rabbit Monoclonal Primary Antibody, Roche Multimer Detection         Primary Antibody:    SP1         Scoring System:    No  "separate scoring system used       Cold Ischemia and Fixation Times:    Meet requirements specified in latest version of the ASCO / CAP Guidelines       Testing Performed on Block Number(s):    A1       METHODS      Fixative:    Formalin       Image Analysis:    Not performed       Block for Additional Biomarkers/Molecular Studies       Normal Block: N/A  Tumor Block: A1      Clinical History       Right breast stereotactic vacuum assisted core biopsy of calcifications.  Breast calcification, right [R92.1]      Gross Description       A: Received in formalin, labeled with the patient's name and hospital number and \"right breast calcification\", are multiple irregular/cylindrical segments of yellow-white fatty soft tissue aggregating to 1.8 x 1.5 x 0 point cm.  The specimen is submitted in toto in 3 cassettes.  SSD    NOTE:  Ischemia time: 6/2/2025 1128.  This specimen was placed into formalin at: 6/2/2025 1130.      Disclaimer       One or more of the reagents used to perform assays on this specimen MAY have contained components considered to be analyte specific reagents (ASR's).  ASR's have not been cleared or approved by the U.S. Food and Drug Administration.  These assays were developed and their performance characteristics determined by the Department of Pathology at [facility]. The FDA does not require this test to go through premarket FDA review. This test is used for clinical purposes. It should not be regarded as investigational or for research. This laboratory is certified under the Clinical Laboratory Improvement Amendments (CLIA) as qualified to perform high complexity clinical laboratory testing.  The assays were performed with appropriate positive and negative controls which stained appropriately.         12:06 PM      Results were successfully communicated with the patient and they acknowledged their understanding. Informed biopsy shows cancer. She will meet with " breast surgical oncologist and plan for care. There is a 2nd area of calcs that may need to be biopsy given the results of DCIS.

## 2025-06-09 PROBLEM — D05.11 DUCTAL CARCINOMA IN SITU (DCIS) OF RIGHT BREAST: Status: ACTIVE | Noted: 2025-06-09

## 2025-06-09 NOTE — PROGRESS NOTES
BREAST SURGICAL ONCOLOGY FOLLOW UP     Subjective   aKsey Duarte is a 60 y.o. female presents today for follow up with newly diagnosed ductal carcinoma in situ of the right breast.   She is referred by Dr. Santosh Leiva and Keerthi Sepulveda, CNP  She denies breast pain, breast lumps or nipple discharge. She denies any change in the skin of the breast or the contour of the breast.    She had a stereotactic core biopsy of a 1.9 cm area of calcifications in the right breast, upper inner aspect at anterior depth on 2025.   Pathology showed:   FINAL DIAGNOSIS   A. BREAST CALCIFICATIONS, RIGHT, UPPER INNER QUADRANT, CORE BIOPSY:      -- Ductal carcinoma in situ, intermediate to high nuclear grade, solid, cribriform, papillary and micropapillary patterns with comedonecrosis and microcalcifications        Cancer Staging   Ductal carcinoma in situ (DCIS) of right breast  Staging form: Breast, AJCC 8th Edition  - Clinical stage from 2025: Stage 0 - Signed by Michelle Dupree MD on 2025  cTis (DCIS)  cN0  cM0  ER Status: Positive  ID Status: Not assessed  HER2 Status: Not assessed      She is here today to discuss these results and develop a treatment plan.    PAST MEDICAL HISTORY: none; gastric sleeve surgery 2015  Non-smoker  Recent issues with swallowing.  She has been found to have a hiatal hernia.  She is seeing Dr. Darlene Blount for further workup.      BREAST IMAGIN2025 bilateral screening mammogram at [facility], BI-RADS Category 0, right breast calcifications in the inferior medial breast warranting additional views.  2025 right diagnostic mammogram, BI-RADS Category 4, right breast indeterminate calcifications in the superior medial breast at anterior depth warranting stereotactic core biopsy, there are at least 2 additional groupings of calcifications in the superior central and superior lateral breast probably benign.  Biopsy concerning group of calcifications warranted.  If biopsy  demonstrates benign findings, short-term follow-up diagnostic mammogram in 6 months is recommended.  If biopsy demonstrates malignant findings additional biopsies should be performed.     REPRODUCTIVE HISTORY: menarche age 11, , first birth age 24, , OCPs, hysterectomy menopause age unknown due to hysterectomy, no HRT, scattered tissue     FAMILY CANCER HISTORY:   Father: prostate cancer  Sister: throat cancer  Daughter: tonsil cancer  Maternal grandmother: stomach cancer  Paternal grandfather: lymphoma    PHYSICAL EXAM:    General: Alert and oriented x 3.  Mood and affect are appropriate.  Breast: Lymph node exam shows no cervical, supraclavicular, or axillary lymphadenopathy.  Breast exam shows symmetric breasts bilaterally with no skin changes, no dominant masses and no nipple discharge in either breast.        Assessment/Plan     Clinical stage 0 right breast cancer  -cTis   Ductal carcinoma in situ / DCIS   ER %    ** There are 2 other areas of calcifications on her mammogram which have not been evaluated.  From her initial diagnostic mammogram report, it noted that if the area of calcifications biopsied showed cancer, these additional areas would need to be biopsied.  I have discussed this with the patient.  We will proceed with additional stereotactic core biopsies of the right breast.    Ductal carcinoma in situ (DCIS) is a form of breast cancer that has been caught very early and the cancer has not spread outside of the milk ducts. DCIS is also called non-invasive breast cancer.  We reviewed your pathology and diagnosis. We discussed your treatment options in detail. We discussed the surgical options for DCIS, which would include a lumpectomy (remove only the cancer in the breast with a small area of normal breast tissue around it) followed by radiation therapy or mastectomy (remove the entire breast) with or without reconstruction.  The survival rate after a lumpectomy with radiation or  a mastectomy are the same.  The chance of a recurrence (the cancer coming back) is slightly higher with a lumpectomy (4-6%) than with a mastectomy (2%).    After a mastectomy, there will be loss of sensation (numbness) across the chest.  If you do not have reconstruction, you will have a straight incision on the chest wall which would be as flat as possible.  You can be fit for a breast prosthesis in the future that you could wear in a bra or bathing suit.  We have discussed options for reconstruction briefly. If you choose to have reconstruction, you will meet with a plastic surgeon who will discuss this more. We discussed how smoking impacts options for breast reconstruction.   We have discussed the typical risks of radiation treatment. More common risks are 'sunburn' like changes in the breast, fatigue (feeling tired) and change in size of the breast. Much less common risks are skin cancers, rib fracture, damage to the heart (if the cancer is on the left side) and long-term skin changes. If you have radiation therapy, you will meet with a radiation oncologist who will discuss this more.   If you are having a lumpectomy and the cancer is not able to be felt in your breast, a small magnetic marking clip called a Magseed will be placed in the breast to help locate the breast cancer tissue that will be removed at surgery. This is a minor procedure which will be done by a radiologist sometime before surgery. It is similar to having the marker placed when you had the biopsy.  You will likely need to have 2 Magseeds placed in the breast.  Because the area of calcifications may span a large area of the breast, possibly up to 6 cm if the additional biopsies show cancer. This is a fair amount of breast tissue that will need to be removed to get out all of the cancer with clear margins.  There may be deformity in the breast following surgery.  The right breast may be smaller than the left breast following surgery and  radiation.   We also discussed that a sentinel lymph node biopsy (remove a few lymph nodes under the arm) may be done if you choose to have a mastectomy.  If we proceed with a lumpectomy, lymph nodes will not be addressed.  If the final pathology shows invasive cancer, you may need to have another surgery for a sentinel lymph node biopsy.    The risks, benefits and procedures of all of these were discussed, including bleeding, infection, scar tissue formation, deformity of the area, additional surgery to get clear margins, and the risk of general anesthesia. I also discussed the possibility that invasive cancer may be seen on the final surgical pathology report. Additional surgery may be needed.  We discussed typical postoperative recovery and restrictions on activities. She understood all of this and her questions were answered.      I have discussed with the patient the pathophysiology of the disease process and the rationale for the planned surgery. I have explained the anticipated risks and possible complications, the incidences and consequences of those risks and complications, and the expected postoperative course. Alternatives have been discussed including the alternative of no surgery. The patient has been given the opportunity to ask questions and all her questions have been answered to her satisfaction.  Individual shared decision making was implemented.  Surgery has been recommended. The risks, benefits and procedure have been reviewed with you today.   You may be scheduled for pre-surgical testing and detailed instructions will be given to you at that appointment.  The pathology results from your surgery should be available about 14 days after the procedure. I will call you with the results. If you do not hear from me within 21 days, please call the office at [phone] for your results.    Schedule additional stereotactic core biopsies of the right breast.    The patient does not want to have a  mastectomy.  Even if the additional biopsies show cancer, she would like to proceed with a lumpectomy.  We would schedule a bracketed Magseed localized right lumpectomy.    We will await final pathology from her additional stereotactic core biopsies before scheduling surgery.    Michelle Dupree MD

## 2025-06-13 ENCOUNTER — APPOINTMENT (OUTPATIENT)
Dept: RADIOLOGY | Facility: HOSPITAL | Age: 60
End: 2025-06-13
Payer: COMMERCIAL

## 2025-06-13 DIAGNOSIS — Z98.84 STATUS POST BARIATRIC SURGERY: ICD-10-CM

## 2025-06-13 DIAGNOSIS — R13.19 ESOPHAGEAL DYSPHAGIA: ICD-10-CM

## 2025-06-13 PROCEDURE — 74240 X-RAY XM UPR GI TRC 1CNTRST: CPT

## 2025-06-13 PROCEDURE — 2500000005 HC RX 250 GENERAL PHARMACY W/O HCPCS: Performed by: STUDENT IN AN ORGANIZED HEALTH CARE EDUCATION/TRAINING PROGRAM

## 2025-06-13 RX ADMIN — BARIUM SULFATE 285 ML: 0.6 SUSPENSION ORAL at 10:20

## 2025-06-17 ENCOUNTER — ANESTHESIA EVENT (OUTPATIENT)
Dept: OPERATING ROOM | Facility: HOSPITAL | Age: 60
End: 2025-06-17
Payer: COMMERCIAL

## 2025-06-17 ENCOUNTER — OFFICE VISIT (OUTPATIENT)
Dept: SURGICAL ONCOLOGY | Facility: CLINIC | Age: 60
End: 2025-06-17
Payer: COMMERCIAL

## 2025-06-17 VITALS
RESPIRATION RATE: 18 BRPM | WEIGHT: 156 LBS | HEART RATE: 81 BPM | DIASTOLIC BLOOD PRESSURE: 66 MMHG | BODY MASS INDEX: 30.63 KG/M2 | TEMPERATURE: 96.8 F | HEIGHT: 60 IN | SYSTOLIC BLOOD PRESSURE: 110 MMHG

## 2025-06-17 DIAGNOSIS — D05.11 DUCTAL CARCINOMA IN SITU (DCIS) OF RIGHT BREAST: Primary | ICD-10-CM

## 2025-06-17 PROCEDURE — 3008F BODY MASS INDEX DOCD: CPT | Performed by: SURGERY

## 2025-06-17 PROCEDURE — 99215 OFFICE O/P EST HI 40 MIN: CPT | Performed by: SURGERY

## 2025-06-17 PROCEDURE — 1036F TOBACCO NON-USER: CPT | Performed by: SURGERY

## 2025-06-17 ASSESSMENT — PAIN SCALES - GENERAL: PAINLEVEL_OUTOF10: 0-NO PAIN

## 2025-06-17 NOTE — LETTER
June 17, 2025     Patient: Kasey Duarte   YOB: 1965   Date of Visit: 6/17/2025       To Whom It May Concern:     Kasey Duarte  was seen in our office today. 6/17/25.    If you have any questions or concerns, please don't hesitate to call.     Sincerely,        Laurence Yoo RN, BSN  Breast Surgical Oncology Care Coordinator  Department of Surgery  Division of Surgical Oncology  [facility]  [address] [place] [address]  [phone] (phone)  [phone] (fax)      CC: No Recipients

## 2025-06-17 NOTE — PATIENT INSTRUCTIONS
Schedule biopsies as discussed  Education material, black binder and surgical book given to patient

## 2025-06-20 ENCOUNTER — HOSPITAL ENCOUNTER (OUTPATIENT)
Dept: OPERATING ROOM | Facility: HOSPITAL | Age: 60
Setting detail: OUTPATIENT SURGERY
Discharge: HOME | End: 2025-06-20
Payer: COMMERCIAL

## 2025-06-20 ENCOUNTER — ANESTHESIA (OUTPATIENT)
Dept: OPERATING ROOM | Facility: HOSPITAL | Age: 60
End: 2025-06-20
Payer: COMMERCIAL

## 2025-06-20 VITALS
WEIGHT: 154.98 LBS | SYSTOLIC BLOOD PRESSURE: 102 MMHG | DIASTOLIC BLOOD PRESSURE: 57 MMHG | HEIGHT: 60 IN | OXYGEN SATURATION: 97 % | BODY MASS INDEX: 30.43 KG/M2 | TEMPERATURE: 96.8 F | HEART RATE: 77 BPM | RESPIRATION RATE: 16 BRPM

## 2025-06-20 DIAGNOSIS — R13.19 ESOPHAGEAL DYSPHAGIA: ICD-10-CM

## 2025-06-20 DIAGNOSIS — Z98.84 STATUS POST BARIATRIC SURGERY: ICD-10-CM

## 2025-06-20 PROCEDURE — 7100000009 HC PHASE TWO TIME - INITIAL BASE CHARGE: Performed by: ANESTHESIOLOGY

## 2025-06-20 PROCEDURE — 3700000001 HC GENERAL ANESTHESIA TIME - INITIAL BASE CHARGE: Performed by: ANESTHESIOLOGY

## 2025-06-20 PROCEDURE — 2500000004 HC RX 250 GENERAL PHARMACY W/ HCPCS (ALT 636 FOR OP/ED): Performed by: SURGERY

## 2025-06-20 PROCEDURE — 2500000004 HC RX 250 GENERAL PHARMACY W/ HCPCS (ALT 636 FOR OP/ED): Performed by: NURSE ANESTHETIST, CERTIFIED REGISTERED

## 2025-06-20 PROCEDURE — 3600000007 HC OR TIME - EACH INCREMENTAL 1 MINUTE - PROCEDURE LEVEL TWO: Performed by: ANESTHESIOLOGY

## 2025-06-20 PROCEDURE — 7100000002 HC RECOVERY ROOM TIME - EACH INCREMENTAL 1 MINUTE: Performed by: ANESTHESIOLOGY

## 2025-06-20 PROCEDURE — 7100000010 HC PHASE TWO TIME - EACH INCREMENTAL 1 MINUTE: Performed by: ANESTHESIOLOGY

## 2025-06-20 PROCEDURE — 3600000002 HC OR TIME - INITIAL BASE CHARGE - PROCEDURE LEVEL TWO: Performed by: ANESTHESIOLOGY

## 2025-06-20 PROCEDURE — 7100000001 HC RECOVERY ROOM TIME - INITIAL BASE CHARGE: Performed by: ANESTHESIOLOGY

## 2025-06-20 PROCEDURE — 43239 EGD BIOPSY SINGLE/MULTIPLE: CPT | Performed by: SURGERY

## 2025-06-20 PROCEDURE — 3700000002 HC GENERAL ANESTHESIA TIME - EACH INCREMENTAL 1 MINUTE: Performed by: ANESTHESIOLOGY

## 2025-06-20 RX ORDER — MIDAZOLAM HYDROCHLORIDE 1 MG/ML
INJECTION, SOLUTION INTRAMUSCULAR; INTRAVENOUS AS NEEDED
Status: DISCONTINUED | OUTPATIENT
Start: 2025-06-20 | End: 2025-06-20

## 2025-06-20 RX ORDER — PROPOFOL 10 MG/ML
INJECTION, EMULSION INTRAVENOUS AS NEEDED
Status: DISCONTINUED | OUTPATIENT
Start: 2025-06-20 | End: 2025-06-20

## 2025-06-20 RX ORDER — LIDOCAINE HCL/PF 100 MG/5ML
SYRINGE (ML) INTRAVENOUS AS NEEDED
Status: DISCONTINUED | OUTPATIENT
Start: 2025-06-20 | End: 2025-06-20

## 2025-06-20 RX ORDER — ONDANSETRON HYDROCHLORIDE 2 MG/ML
INJECTION, SOLUTION INTRAVENOUS AS NEEDED
Status: DISCONTINUED | OUTPATIENT
Start: 2025-06-20 | End: 2025-06-20

## 2025-06-20 RX ORDER — FENTANYL CITRATE 50 UG/ML
INJECTION, SOLUTION INTRAMUSCULAR; INTRAVENOUS AS NEEDED
Status: DISCONTINUED | OUTPATIENT
Start: 2025-06-20 | End: 2025-06-20

## 2025-06-20 RX ORDER — SUCCINYLCHOLINE CHLORIDE 20 MG/ML
INJECTION INTRAMUSCULAR; INTRAVENOUS AS NEEDED
Status: DISCONTINUED | OUTPATIENT
Start: 2025-06-20 | End: 2025-06-20

## 2025-06-20 RX ORDER — SODIUM CHLORIDE, SODIUM LACTATE, POTASSIUM CHLORIDE, CALCIUM CHLORIDE 600; 310; 30; 20 MG/100ML; MG/100ML; MG/100ML; MG/100ML
50 INJECTION, SOLUTION INTRAVENOUS CONTINUOUS
Status: DISCONTINUED | OUTPATIENT
Start: 2025-06-20 | End: 2025-06-21 | Stop reason: HOSPADM

## 2025-06-20 RX ADMIN — DEXAMETHASONE SODIUM PHOSPHATE 4 MG: 4 INJECTION INTRA-ARTICULAR; INTRALESIONAL; INTRAMUSCULAR; INTRAVENOUS; SOFT TISSUE at 10:33

## 2025-06-20 RX ADMIN — MIDAZOLAM 2 MG: 1 INJECTION INTRAMUSCULAR; INTRAVENOUS at 10:20

## 2025-06-20 RX ADMIN — SODIUM CHLORIDE, SODIUM LACTATE, POTASSIUM CHLORIDE, AND CALCIUM CHLORIDE 50 ML/HR: .6; .31; .03; .02 INJECTION, SOLUTION INTRAVENOUS at 10:03

## 2025-06-20 RX ADMIN — ONDANSETRON 4 MG: 2 INJECTION, SOLUTION INTRAMUSCULAR; INTRAVENOUS at 10:35

## 2025-06-20 RX ADMIN — SUCCINYLCHOLINE CHLORIDE 140 MG: 20 INJECTION, SOLUTION INTRAMUSCULAR; INTRAVENOUS at 10:28

## 2025-06-20 RX ADMIN — PROPOFOL 140 MG: 10 INJECTION, EMULSION INTRAVENOUS at 10:28

## 2025-06-20 RX ADMIN — LIDOCAINE HYDROCHLORIDE 50 MG: 20 INJECTION INTRAVENOUS at 10:28

## 2025-06-20 RX ADMIN — FENTANYL CITRATE 50 MCG: 50 INJECTION, SOLUTION INTRAMUSCULAR; INTRAVENOUS at 10:28

## 2025-06-20 SDOH — HEALTH STABILITY: MENTAL HEALTH: CURRENT SMOKER: 0

## 2025-06-20 ASSESSMENT — COLUMBIA-SUICIDE SEVERITY RATING SCALE - C-SSRS
6. HAVE YOU EVER DONE ANYTHING, STARTED TO DO ANYTHING, OR PREPARED TO DO ANYTHING TO END YOUR LIFE?: NO
1. IN THE PAST MONTH, HAVE YOU WISHED YOU WERE DEAD OR WISHED YOU COULD GO TO SLEEP AND NOT WAKE UP?: NO
2. HAVE YOU ACTUALLY HAD ANY THOUGHTS OF KILLING YOURSELF?: NO

## 2025-06-20 ASSESSMENT — PAIN SCALES - GENERAL
PAINLEVEL_OUTOF10: 0 - NO PAIN
PAIN_LEVEL: 2
PAINLEVEL_OUTOF10: 0 - NO PAIN

## 2025-06-20 ASSESSMENT — PAIN - FUNCTIONAL ASSESSMENT
PAIN_FUNCTIONAL_ASSESSMENT: 0-10
PAIN_FUNCTIONAL_ASSESSMENT: 0-10

## 2025-06-20 NOTE — ANESTHESIA PROCEDURE NOTES
Airway  Date/Time: 6/20/2025 10:32 AM  Reason: elective    Airway not difficult    Staffing  Performed: CRNA   Authorized by: Jr Jane MD    Performed by: SHABBIR Toro-MARLON  Patient location during procedure: OR    Patient Condition  Indications for airway management: anesthesia  Patient position: reverse Trendelenburg  Planned trial extubation  Sedation level: deep     Final Airway Details   Preoxygenated: yes  Final airway type: endotracheal airway  Successful airway: ETT   Successful intubation technique: video laryngoscopy  Adjuncts used in placement: intubating stylet  Endotracheal tube insertion site: oral  Blade type: BLANK.  Blade size: #3  ETT size (mm): 7.0  Cormack-Lehane Classification: grade I - full view of glottis  Placement verified by: chest auscultation and capnometry   Measured from: lips  ETT to lips (cm): 21  Number of attempts at approach: 1

## 2025-06-20 NOTE — ANESTHESIA POSTPROCEDURE EVALUATION
Patient: Kasey Duarte    Procedure Summary       Date: 06/20/25 Room / Location: [facility] OR    Anesthesia Start: 1019 Anesthesia Stop: 1051    Procedure: EGD Diagnosis:       Esophageal dysphagia      Status post bariatric surgery    Scheduled Providers: Darlene Blount MD MPH; Jr Jane MD Responsible Provider: Jr Jane MD    Anesthesia Type: general ASA Status: 2            Anesthesia Type: general    Vitals Value Taken Time   /67 06/20/25 11:05   Temp 36 °C (96.8 °F) 06/20/25 10:49   Pulse 74 06/20/25 11:05   Resp 15 06/20/25 11:05   SpO2 98 % 06/20/25 11:05       Anesthesia Post Evaluation    Patient location during evaluation: PACU  Patient participation: complete - patient participated  Level of consciousness: awake  Pain score: 2  Pain management: adequate  Multimodal analgesia pain management approach  Airway patency: patent  Two or more strategies used to mitigate risk of obstructive sleep apnea  Cardiovascular status: acceptable  Respiratory status: acceptable  Hydration status: acceptable  Postoperative Nausea and Vomiting: none        No notable events documented.

## 2025-06-20 NOTE — ANESTHESIA PREPROCEDURE EVALUATION
Patient: Kasey Duarte    Procedure Information       Date/Time: 25 0950    Scheduled providers: Darlene Blount MD MPH; Jr Jane MD    Procedure: EGD    Location: [facility] OR            Relevant Problems   Pulmonary   (+) STEF (obstructive sleep apnea)      GI   (+) Hiatal hernia      /Renal   (+) Pyelonephritis      ID   (+) Pyelonephritis       Clinical information reviewed:    Allergies  Meds               NPO Detail:  NPO/Void Status  Date of Last Liquid: 25  Time of Last Liquid:   Date of Last Solid: 25  Time of Last Solid:          Physical Exam    Airway  Mallampati: II  TM distance: >3 FB  Mouth openin finger widths     Cardiovascular - normal exam   Dental    Pulmonary - normal exam   Abdominal - normal exam         Anesthesia Plan    History of general anesthesia?: yes  History of complications of general anesthesia?: no    ASA 2     general   (GETA RSI DT pt taking semaglutide w/I 24 hrs and not on clear liquids)  The patient is not a current smoker.    Anesthetic plan and risks discussed with patient.    Plan discussed with CRNA and attending.

## 2025-06-20 NOTE — H&P
History Of Present Illness  Kasey Duarte is a 60 y.o. female presenting with   Sleeve in 2015 at [facility] and and weighed 238 lbs at that time.  Lowest weight was 159 lbs.  Today is 160 lbs.  She was higher but has been on semaglutide since February.  Since then she is down 30 lbs.  She is here because she has ongoing concerns.  She ate a sandwich in April and layed down.   She has been told she had a hiatal hernia and had some study showing it but nothing done about.  This time layed down after eating, vomited and it came out of her nose and chunks of meat came through her nose.  Then after that food sticks in mid chest.  Isabel not go anywhere without water.   Deals with it by chewing well and eating slow.  Avoids hard food.  Even water sits there. This is affecting her lifestyle and quality of life.  Dry and chunky foods are worse.  Softer foods ok.  No hb unless eats late at night.  Keeps tums at home.  She saw Hylton in 2023 and he fixed her umbilical  hernia laparoscopically.  CT showed the hiatal hernia.  My review shows good portion of sleeve in chest then.  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  We will do an upper endoscopy and we will do an upper GI study.  Counseled on diet and exercise.  We discussed once we do the tests then can determine if hiatal hernia repair needed.      Plan:  We will for an endoscopy  We will get imaging     Follow the pouch rules:     - - Eat 5 small meals per day (3 meals and 2 snacks)  - Take in at least 60 g protein daily (try to get through lean meats, dairy, legumes)  - Eat 5 vegetables per day  - No sweets, fruits are fine.  Berries and citrus are lower glycemic index fruits  -Limit rice, bread, pasta and potatoes.  These should only be consumed after having your protein and vegetables  - Drink at least 60 oz fluid daily, (non caffeinated, no carbonated beverages, sugar free)  - Get 60 minutes of exercise daily    Follow up after the studies.   Please have your yearly lab work done (if you have not already) - We will call you with any abnormal lab results.     Be sure to continue with exercise, goal should be 3-5 times a week 60 minutes at a time.     Increase variety and intensity of your work out routine.     Make sure you are taking your multivitamin, B12 and calcium.     See the Dietician as needed.     Dr. Darlene Blount M.D., MPH  Director of Bariatric & Minimally Invasive Surgery  [place] [address]  T:  [phone]  F:  [phone]      Shannon Zavaleta, RN Assistant Nurse Manager, Care Coordinator Patient Nursing Contact  T: [phone]  F: [phone]                          .     Past Medical History  Medical History[1]    Surgical History  Surgical History[2]     Social History  She reports that she has never smoked. She has never been exposed to tobacco smoke. She has never used smokeless tobacco. She reports current alcohol use of about 4.0 standard drinks of alcohol per week. She reports that she does not use drugs.    Family History  Family History[3]     Allergies  Nsaids (non-steroidal anti-inflammatory drug), Penicillins, and Propoxyphene n-acetaminophen    Review of Systems   All other systems reviewed and are negative.       Physical Exam  Vitals and nursing note reviewed.   Constitutional:       Appearance: Normal appearance.   HENT:      Head: Normocephalic.      Nose: Nose normal.      Mouth/Throat:      Mouth: Mucous membranes are moist.   Cardiovascular:      Rate and Rhythm: Normal rate and regular rhythm.   Abdominal:      General: Abdomen is flat. Bowel sounds are normal.      Palpations: Abdomen is soft.   Musculoskeletal:         General: Normal range of motion.      Cervical back: Normal range of motion.   Skin:     General: Skin is warm.   Neurological:      Mental Status: She is alert.          Last Recorded Vitals  not currently  breastfeeding.    Relevant Results    Current Medications[4]     To or for egd     Assessment & Plan  Esophageal dysphagia    Status post bariatric surgery      Plan egd    I spent 30 minutes in the professional and overall care of this patient.      Darlene Blount MD MPH         [1]   Past Medical History:  Diagnosis Date    Arthropathy, unspecified 2018    Arthropathy    Erosion of implanted vaginal mesh and prosthetic materials     Esophageal dysphagia     Hiatal hernia     Hypoactive sexual desire disorder 2020    Hypoactive sexual desire disorder    Iron deficiency     Postmenopausal atrophic vaginitis 2020    Vaginal atrophy    Status post abdominal supracervical subtotal hysterectomy     Status post bariatric surgery    [2]   Past Surgical History:  Procedure Laterality Date     SECTION, CLASSIC  2014     Section    HYSTERECTOMY  2014    Open supracervical hyst w/ Dr. Tanner     OTHER SURGICAL HISTORY  2020    OTHER SURGICAL HISTORY  2014    Arthrotomy Of Knee    OTHER SURGICAL HISTORY  2014    Laparoscopic Sling Operation For Stress Incontinence    OTHER SURGICAL HISTORY  2014    Foot Surgery Left    OTHER SURGICAL HISTORY  2014    Ovarian Cystectomy Right    TONSILLECTOMY  2014    Tonsillectomy    UMBILICAL HERNIA REPAIR  2014    with mesh   [3]   Family History  Problem Relation Name Age of Onset    Coronary artery disease Mother      Diabetes Mother      Hypertension Mother          essential    Kidney disease Mother      Other (myocardial infarction) Mother      Diabetes type II Mother      Prostate cancer Father      Coronary artery disease Father      Hypertension Father          essential    Stroke Father      Hypertension Sister          essential    Throat cancer Sister      Hypertension Brother Isai 20 - 29    Hypertension Daughter      Other (malignant neoplasm of tonsil) Daughter      Diabetes Maternal  Grandmother      Other (malignant neoplasm of stomach) Maternal Grandmother      Lymphoma Paternal Grandfather     [4]   Current Outpatient Medications:     ascorbic acid (Vitamin C) 250 mg tablet, Take 1 tablet (250 mg) by mouth once daily in the morning. Take before meals. Take with iron tablet daily, Disp: 30 tablet, Rfl: 2    cholecalciferol (Vitamin D-3) 50 mcg (2,000 units) tablet, Take 1 tablet (50 mcg) by mouth once daily., Disp: 30 tablet, Rfl: 2    ferrous sulfate (Iron, ferrous sulfate,) 325 mg (65 mg elemental) tablet, Take 1 tablet by mouth once daily with breakfast., Disp: 30 tablet, Rfl: 2    Intrarosa 6.5 mg insert, INSERT 1 INSERT VAGINALLY EVERY NIGHT AT BEDTIME, Disp: 28 each, Rfl: 3    minoxidil (Loniten) 2.5 mg tablet, Take 1 tablet (2.5 mg) by mouth once daily., Disp: , Rfl:     semaglutide 0.5 mg/0.1 mL syringe, Inject 0.5 mg under the skin 1 (one) time per week., Disp: , Rfl:     Current Facility-Administered Medications:     lactated Ringer's infusion, 50 mL/hr, intravenous, Continuous, Darlene Blount MD MPH

## 2025-06-25 ENCOUNTER — HOSPITAL ENCOUNTER (OUTPATIENT)
Dept: RADIOLOGY | Facility: CLINIC | Age: 60
Discharge: HOME | End: 2025-06-25
Payer: COMMERCIAL

## 2025-06-25 DIAGNOSIS — R92.1 CALCIFICATION OF RIGHT BREAST: ICD-10-CM

## 2025-06-25 DIAGNOSIS — R92.8 OTHER ABNORMAL AND INCONCLUSIVE FINDINGS ON DIAGNOSTIC IMAGING OF BREAST: ICD-10-CM

## 2025-06-25 PROCEDURE — 19081 BX BREAST 1ST LESION STRTCTC: CPT | Mod: RT

## 2025-06-25 PROCEDURE — 2500000004 HC RX 250 GENERAL PHARMACY W/ HCPCS (ALT 636 FOR OP/ED): Performed by: STUDENT IN AN ORGANIZED HEALTH CARE EDUCATION/TRAINING PROGRAM

## 2025-06-25 PROCEDURE — 19081 BX BREAST 1ST LESION STRTCTC: CPT | Mod: RIGHT SIDE | Performed by: STUDENT IN AN ORGANIZED HEALTH CARE EDUCATION/TRAINING PROGRAM

## 2025-06-25 PROCEDURE — C1739 HC OR 272 NO HCPCS: HCPCS

## 2025-06-25 PROCEDURE — 2720000007 HC OR 272 NO HCPCS

## 2025-06-25 PROCEDURE — 77065 DX MAMMO INCL CAD UNI: CPT | Mod: RIGHT SIDE | Performed by: STUDENT IN AN ORGANIZED HEALTH CARE EDUCATION/TRAINING PROGRAM

## 2025-06-25 RX ADMIN — Medication 20 ML: at 13:10

## 2025-06-25 ASSESSMENT — PAIN SCALES - GENERAL
PAINLEVEL_OUTOF10: 1
PAINLEVEL_OUTOF10: 3
PAINLEVEL_OUTOF10: 1
PAINLEVEL_OUTOF10: 0 - NO PAIN

## 2025-06-25 NOTE — Clinical Note
Pressure held x 10 minutes, incision dry, steri strips intact and compression dressing applied. Ice pack and surgi bra applied prior to discharge.

## 2025-06-25 NOTE — DISCHARGE INSTRUCTIONS
AFTER THE TEST  A steri-strip and bandage will be placed over the incision. You may shower after 24 hours. Remove bandage after 24 hours. Remove bandage after the shower. Leave the steri-strips in place to fall off on their own. If after 1 week the steri-strips are still on, you may remove them. Avoid swimming or soaking in tub for 3 days.     You may have mild discomfort at the test site. If needed, you may take Tylenol (Acetaminophen) for pain. Please avoid taking NSAIDs, Motrin, Advil, Aleve, or ibuprofen for 24 hours following the biopsy. After 24 hours you may resume NSAIDSs.     If you take aspirin, Plavix, Coumadin, Xarelto or Eliquis please tell us. If these medications were stopped by your provider, please ask them when to resume.     You may have some tenderness, bruising or slight bleeding at the site. Please apply ice packs to the site for 15 minutes on and 15 minutes off for a 2 hour minimum.     Most people can return to their usual routine after the procedure. Avoid Strenuous activity for 24 hours.     Sleep in a bra the night after your biopsy. Continue to do so for comfort.     Call your provider if you have any of the following symptoms :  Fever  Increased pain  Increased bleeding  Redness  Increased swelling  Yellowish drainage  Your provider will get the biopsy results within 5 - 7 days. Call your provider with any questions.     Patient education brochure and pain/comfort measures have been reviewed.   Phone number provided to contact [facility] if problems arise.     Patient verbalized understanding of home going instructions.  Reviewed hematoma care.  Warm packs provided for 24 post procedure if patient prefers to alternate ice and heat tomorrow.

## 2025-06-26 ENCOUNTER — TELEPHONE (OUTPATIENT)
Dept: RADIOLOGY | Facility: CLINIC | Age: 60
End: 2025-06-26
Payer: COMMERCIAL

## 2025-07-01 NOTE — PROGRESS NOTES
BARIATRIC SURGICAL WEIGHT LOSS   POST DIAGNOSTIC TESTING FOLLOW UP VISIT    CLINIC DATE:  7/9/25    NAME: Kasey Duarte 60 y.o.  MRN: [id number]    Date of Surgery: 7/2015   Surgical Procedure: Laparoscopic sleeve gastrectomy    Extra Procedures: None      INITIAL WEIGHT:  unknown   PRE-SURGICAL WEIGHT:  unknown    THIS VISIT WEIGHT / BMI:    Wt Readings from Last 1 Encounters:   07/10/25 71.2 kg (157 lb)      BMI Readings from Last 1 Encounters:   07/10/25 30.66 kg/m²     WEIGHT / BMI HX:    Wt Readings from Last 3 Encounters:   07/10/25 71.2 kg (157 lb)   06/20/25 70.3 kg (154 lb 15.7 oz)   06/17/25 70.8 kg (156 lb)      BMI Readings from Last 3 Encounters:   07/10/25 30.66 kg/m²   06/20/25 30.27 kg/m²   06/17/25 30.23 kg/m²      PROVIDER LAST IMPRESSION VISIT NOTE 5/28/25: Sleeve in 2015 at [facility] and and weighed 238 lbs at that time.  Lowest weight was 159 lbs.  Today is 160 lbs.  She was higher but has been on semaglutide since February.  Since then she is down 30 lbs.  She is here because she has ongoing concerns.  She ate a sandwich in April and layed down.   She has been told she had a hiatal hernia and had some study showing it but nothing done about.  This time layed down after eating, vomited and it came out of her nose and chunks of meat came through her nose.  Then after that food sticks in mid chest.  Cross not go anywhere without water.   Deals with it by chewing well and eating slow.  Avoids hard food.  Even water sits there. This is affecting her lifestyle and quality of life.  Dry and chunky foods are worse.  Softer foods ok.  No hb unless eats late at night.  Keeps tums at home.  She saw Hylton in 2023 and he fixed her umbilical  hernia laparoscopically.  CT showed the hiatal hernia.  My review shows good portion of sleeve in chest then.  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  We will do an upper endoscopy and we will do an upper GI  study.  Counseled on diet and exercise.  We discussed once we do the tests then can determine if hiatal hernia repair needed.   Plan:  We will for an endoscopy  We will get imaging       PRESENTING FOR Bariatric Surgical Weight Loss Post Diagnostic Testing Results Review FUV: is a 60 y.o. female who is 10 yrs S/P:  sleeve & has completed previously ordered EGD and esophagram.   No new things other than still vomiting. Tried eating smaller meals and sitting up for 30 min and still vomiting.  Would like to address surgically.   Would be ok to do the hernia repair end of August as we time with the breast cancer treatment.  No dysphagia and pain when food sticks.  Dryer and bigger pieces of dry meat needed to be chewed well.   TEST RESULTS:  Esophagram 6/13/25:   Show images for FL upper GI w KUB   FINDINGS:  KUB demonstrate evidence of previous gastric sleeve surgery and  fundoplication. There is also evidence of previous abdominal hernia  repair with mesh.      Initiation of swallowing mechanism is intact. There is no aspiration.  The esophagus is normal in course and caliber, without permanent  filling defects or strictures. Sliding hiatal hernia is present  measuring 4.2 cm. There is associated gastroesophageal reflux  extending to the cervical esophagus. The opacified stomach, duodenal  bulb and duodenal C-loop appear normal in configuration.      IMPRESSION:  1. Sliding hiatal hernia measuring 4.2 cm, with moderate  gastroesophageal reflux.  2. Evidence of previous gastric sleeve surgery, fundoplication  previous abdominal ventral hernia repair.    EGD 6/20/25:  Impression  The esophagus appeared normal.  5 cm type III hiatal hernia  The stomach appeared normal. Performed random biopsy.  Erythematous mucosa in the antrum  The duodenum appeared normal.  Findings  The esophagus appeared normal.  5 cm herniation of both GE junction and stomach (type III hiatal hernia) without Darron lesions present - GE junction 34  cm from the incisors, diaphragmatic impression 39 cm from the incisors, confirmed by retroflexion  The stomach appeared normal. Performed random biopsy using biopsy forceps.  Erythematous mucosa in the antrum  The duodenum appeared normal.  Recommendation  Await pathology results   Follow up with me in clinic   See me after studies done  FINAL DIAGNOSIS   A. STOMACH ANTRUM BIOPSY:   Antral mucosa with features of reactive gastropathy.  No morphologic evidence of Helicobacter pylori-like organisms.                    CURRENT SYMPTOMS:      Abdominal pain:  No            Constipation: No    Diarrhea: No    Dumping Syndrome:  No    Experiencing hunger: No    Food Intolerances: Yes, solids- if she doesn't chew foods very well    Nausea/vomiting: Yes vomiting after eating    Reflux:  No    Regurgitation:  No    Weight Regain: No       DIET HISTORY:       Carbonated Beverages: No          Caffeinated Beverages: Yes    Time to eat meals: 30-45 Minutes    Fluid intake: 64 oz/day      Protein Intake:  40 grams/day    Breakfast: oikos zero yogurt or breakfast bar    Lunch: sandwich or leftovers    Dinner: salad    Snacks: berries, apple with PB    GERD - Health Related Quality of Life Questionnaire (GERD- HRQL)  Off PPI for   (how long)    How bad is the heartburn? 0 = No symptoms  Heartburn when lying down? 0 = No symptoms  Heartburn when standing up? 0 = No symptoms  Heartburn after meals? 0 = No symptoms  Does heartburn change your diet? 0 = No symptoms  Does heartburn wake you from sleep? 0 = No symptoms    Do you have difficulty swallowing? 0 = No symptoms  Do you have pain with swallowing? 0 = No symptoms  If you take medication, does this affect your daily life? 0 = No symptoms    How bad is the regurgitation? 0 = No symptoms  Regurgitation when lying down? 0 = No symptoms  Regurgitation when standing up? 0 = No symptoms  Regurgitation after meals? 0 = No symptoms  Does regurgitation change your diet? 0 = No  symptoms  Does regurgitation wake you from sleep? 0 = No symptoms    How satisfied are you with your present condition? Satisfied    Total score (calculated by summing the individual scores of questions 1-15): 0   Greatest possible score 75 (worst symptoms).   Lowest possible score 0 (no symptoms).    Heartburn score (calculated by summing the individual scores of questions 1-6): 0   Worst heartburn symptoms: 30.   No heartburn symptoms: 0.   Score less than or equal to 12 with each individual question not exceeding 2 indicate heartburn elimination.    Regurgitation score (calculated by summing the individual scores of questions 10-15): 0   Worst regurgitation symptoms: 30.   No regurgitation symptoms: 0.   Score less than or equal to 12 with each individual question not exceeding 2 indicate regurgitation elimination.    EXERCISE:  Yes     CURRENT MEDICATIONS:  Current Medications[1]    PAST MEDICAL HISTORY:    Medical History[2]     PAST SURGICAL HISTORY:  Surgical History[3]    FAMILY HISTORY:    Family History[4]     SOCIAL HISTORY:    Social History[5]    ALLERGIES:    Allergies[6]    REVIEW OF SYSTEMS:  GENERAL: Negative for malaise, significant weight loss and fever  NECK: Negative for lumps, goiter, pain and significant neck swelling  RESPIRATORY: Negative for cough, wheezing or shortness of breath.  CARDIOVASCULAR: Negative for chest pain, leg swelling or palpitations.  GI: Negative for abdominal discomfort, blood in stools or black stools or change in bowel habits  : No history of dysuria, frequency or incontinence  MUSCULOSKELETAL: Negative for joint pain or swelling, back pain or muscle pain.  SKIN: Negative for lesions, rash, and itching.  PSYCH: Negative for sleep disturbance, mood disorder and recent psychosocial stressors.  ENDOCRINE: Negative for cold or heat intolerance, polyuria, polydipsia and goiter.    PHYSICAL EXAM  There were no vitals taken for this visit.  General appearance: obese  Skin:  warm, no erythema or rashes  Lungs: clear to percussion and auscultation  Heart: regular rhythm and S1, S2 normal  Abdomen: soft, nt/nd  Extremities: Normal exam of the extremities. No swelling or pain.    IMPRESSION:  Kasey Duarte is a 60 y.o. female with hiatal hernia post sleeve. Sleeve in 2015 at [facility] and and weighed 238 lbs at that time.  Lowest weight was 159 lbs.  Today is 160 lbs.  She was higher but has been on semaglutide since February.  Since then she is down 30 lbs.  She is here because she has ongoing concerns.  She ate a sandwich in April and layed down.   She has been told she had a hiatal hernia and had some study showing it but nothing done about.  This time layed down after eating, vomited and it came out of her nose and chunks of meat came through her nose.  Then after that food sticks in mid chest.  Elwood not go anywhere without water.   Deals with it by chewing well and eating slow.  Avoids hard food.  Even water sits there. This is affecting her lifestyle and quality of life.  Dry and chunky foods are worse.  Softer foods ok.  No hb unless eats late at night.  Keeps tums at home.  She saw Hylton in 2023 and he fixed her umbilical  hernia laparoscopically.  CT showed the hiatal hernia.  My review shows good portion of sleeve in chest then.  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     During the time the patient was going through a workup, she also was diagnosed with breast cancer.  Knowing that she has so many health issues the patient would like all of these addressed.  She is due to have a lumpectomy for her breast in August.  Going out August 7 for lumpectomy and then will get radiation starting 5-6 weeks later.   We will plan to do a hiatal hernia repair in August later in the month.  Her hiatal hernia is extremely symptomatic causing her pain and vomiting with chunks of food coming out of her nose.  The patient does not want  to continue living like this.  We discussed the upper endoscopy and the esophagram that both confirmed the sizable hiatal hernia that was also seen on CT previous.  We will plan to do a hiatal hernia repair with possible mesh.  We discussed the risks and benefits of the procedure at length.  We discussed that the procedure may be somewhat complicated because of her prior operations.  We will get this procedure done before she starts her radiation.  I think this will work well for the patient.  We discussed the risks and benefits at length and all questions were answered.  PLAN:  We will plan to fix your hiatal hernia after your lumpectomy is completed  You will be meeting with anesthesia prior to the procedure  He will meet with the dietitian to go over dietary instructions after the procedure  You will need to stop the semaglutide 2 weeks before the procedure      45 minutes spent with patient on face-to-face interaction, history/documentation, education, and coordination of care.    Dr. Darlene Blount M.D., MPH  Director of Bariatric & Minimally Invasive Surgery  [place] [address]  T:  [phone]  F:  [phone]     Shannon Zavaleta RN Assistant Nurse Manager, Care Coordinator Patient Nursing Contact  T: [phone] F: [phone]          [1]   Current Outpatient Medications   Medication Sig Dispense Refill    ascorbic acid (Vitamin C) 250 mg tablet Take 1 tablet (250 mg) by mouth once daily in the morning. Take before meals. Take with iron tablet daily 30 tablet 2    cholecalciferol (Vitamin D-3) 50 mcg (2,000 units) tablet Take 1 tablet (50 mcg) by mouth once daily. 30 tablet 2    ferrous sulfate (Iron, ferrous sulfate,) 325 mg (65 mg elemental) tablet Take 1 tablet by mouth once daily with breakfast. 30 tablet 2    Intrarosa 6.5 mg insert INSERT 1 INSERT VAGINALLY EVERY NIGHT AT BEDTIME 28 each 3    minoxidil (Loniten) 2.5 mg tablet Take 1 tablet (2.5 mg) by  mouth once daily.      semaglutide 0.5 mg/0.1 mL syringe Inject 0.5 mg under the skin 1 (one) time per week.       No current facility-administered medications for this visit.   [2]   Past Medical History:  Diagnosis Date    Arthropathy, unspecified 2018    Arthropathy    Breast cancer 24    Erosion of implanted vaginal mesh and prosthetic materials     Esophageal dysphagia     Hiatal hernia     Hypoactive sexual desire disorder 2020    Hypoactive sexual desire disorder    Iron deficiency     Postmenopausal atrophic vaginitis 2020    Vaginal atrophy    Status post abdominal supracervical subtotal hysterectomy     Status post bariatric surgery    [3]   Past Surgical History:  Procedure Laterality Date     SECTION, CLASSIC  2014     Section    HYSTERECTOMY  2014    Open supracervical hyst w/ Dr. Tanner     OTHER SURGICAL HISTORY  2020    OTHER SURGICAL HISTORY  2014    Arthrotomy Of Knee    OTHER SURGICAL HISTORY  2014    Laparoscopic Sling Operation For Stress Incontinence    OTHER SURGICAL HISTORY  2014    Foot Surgery Left    OTHER SURGICAL HISTORY  2014    Ovarian Cystectomy Right    TONSILLECTOMY  2014    Tonsillectomy    UMBILICAL HERNIA REPAIR  2014    with mesh   [4]   Family History  Problem Relation Name Age of Onset    Coronary artery disease Mother      Diabetes Mother      Hypertension Mother          essential    Kidney disease Mother      Other (myocardial infarction) Mother      Diabetes type II Mother      Prostate cancer Father      Coronary artery disease Father      Hypertension Father          essential    Stroke Father      Hypertension Sister          essential    Throat cancer Sister      Hypertension Brother Isai 20 - 29    Hypertension Daughter      Other (malignant neoplasm of tonsil) Daughter      Diabetes Maternal Grandmother      Other (malignant neoplasm of stomach) Maternal Grandmother       "Lymphoma Paternal Grandfather     [5]   Social History  Tobacco Use    Smoking status: Never     Passive exposure: Never    Smokeless tobacco: Never   Vaping Use    Vaping status: Never Used   Substance Use Topics    Alcohol use: Yes     Alcohol/week: 4.0 standard drinks of alcohol     Types: 4 Glasses of wine per week     Comment: 4 glasses of wine a week per pt    Drug use: Never   [6]   Allergies  Allergen Reactions    Nsaids (Non-Steroidal Anti-Inflammatory Drug) Hives    Penicillins Anaphylaxis    Propoxyphene N-Acetaminophen Other     Violent migraine per pt    \"Darvocet\"     "

## 2025-07-02 ENCOUNTER — TELEPHONE (OUTPATIENT)
Dept: SURGICAL ONCOLOGY | Facility: CLINIC | Age: 60
End: 2025-07-02
Payer: COMMERCIAL

## 2025-07-02 LAB
LAB AP ASR DISCLAIMER: NORMAL
LAB AP BLOCK FOR ADDITIONAL STUDIES: NORMAL
LABORATORY COMMENT REPORT: NORMAL
PATH REPORT.COMMENTS IMP SPEC: NORMAL
PATH REPORT.FINAL DX SPEC: NORMAL
PATH REPORT.GROSS SPEC: NORMAL
PATH REPORT.RELEVANT HX SPEC: NORMAL
PATH REPORT.TOTAL CANCER: NORMAL
PATHOLOGY SYNOPTIC REPORT: NORMAL

## 2025-07-02 NOTE — TELEPHONE ENCOUNTER
Result Communication    Resulted Orders   Surgical Pathology Exam   Result Value Ref Range    Case Report       Surgical Pathology                                Case: [id number]                                  Authorizing Provider:  Michelle Dupree MD            Collected:           06/25/2025 1321              Ordering Location:     [facility]       Received:            06/25/2025 1430                                     Center                                                                       Pathologist:           Oracio Jarrett MD                                                            Specimen:    BREAST CORE BIOPSY RIGHT, Right breast calcifications                                      FINAL DIAGNOSIS       A. Right breast calcifications, stereotactic guided core biopsy:   --  Ductal carcinoma in situ, intermediate nuclear grade, solid and cribriform patterns with necrosis and calcifications, see note.    Note:  ER will be reported in a synoptic report.                 By the signature on this report, the individual or group listed as making the Final Interpretation/Diagnosis certifies that they have reviewed this case.       Comment       The case was reviewed at [facility] via Zoom with concordance.      Case Summary Report       Breast Biomarker Reporting Template   BREAST BIOMARKER REPORTING TEMPLATE - All Specimens   Protocol posted: 12/13/2023         Test(s) Performed:            Estrogen Receptor (ER) Status:    Positive (greater than 10% of cells demonstrate nuclear positivity)           Percentage of Cells with Nuclear Positivity:    %           Average Intensity of Staining:    Strong         Test Type:    Food and Drug Administration (FDA) cleared (test / vendor): Roche CONFIRM anti-(ER) (SP1) Rabbit Monoclonal Primary Antibody, Roche Multimer Detection         Primary Antibody:    SP1         Scoring System:    No separate scoring system used       Cold  "Ischemia and Fixation Times:    Meet requirements specified in latest version of the ASCO / CAP Guidelines       Testing Performed on Block Number(s):    A1       METHODS      Fixative:    Formalin       Image Analysis:    Not performed          Comment(s):    Analysis is performed on ductal carcinoma in situ.       Block for Additional Biomarkers/Molecular Studies       Tumor Block: A1      Clinical History       Right breast stereotactic vacuum assisted biopsy of calcifications for extent.      Gross Description       A: Received in formalin, labeled with the patient´s name and hospital number and \"right breast calcifications\", are multiple irregular segments of yellow-white fatty soft tissue aggregating to 3.0 x 2.5 x 0.5 cm.  The specimen is submitted in toto in 2 cassettes.  KE    NOTE:  Ischemia time: 6/25/2025  13:21  This specimen was placed into formalin at: 6/25/2025  13:23      Disclaimer       One or more of the reagents used to perform assays on this specimen MAY have contained components considered to be analyte specific reagents (ASR's).  ASR's have not been cleared or approved by the U.S. Food and Drug Administration.  These assays were developed and their performance characteristics determined by the Department of Pathology at [facility]. The FDA does not require this test to go through premarket FDA review. This test is used for clinical purposes. It should not be regarded as investigational or for research. This laboratory is certified under the Clinical Laboratory Improvement Amendments (CLIA) as qualified to perform high complexity clinical laboratory testing.  The assays were performed with appropriate positive and negative controls which stained appropriately.         3:16 PM      The patient was called with the pathology results.  A message was left on voicemail.  I left a message that the biopsy did show DCIS.  I will call her back tomorrow to confirm a " surgical plan.

## 2025-07-03 ENCOUNTER — PREP FOR PROCEDURE (OUTPATIENT)
Dept: SURGICAL ONCOLOGY | Facility: CLINIC | Age: 60
End: 2025-07-03
Payer: COMMERCIAL

## 2025-07-03 ENCOUNTER — TELEPHONE (OUTPATIENT)
Dept: SURGICAL ONCOLOGY | Facility: CLINIC | Age: 60
End: 2025-07-03
Payer: COMMERCIAL

## 2025-07-03 DIAGNOSIS — D05.11 DUCTAL CARCINOMA IN SITU (DCIS) OF RIGHT BREAST: Primary | ICD-10-CM

## 2025-07-03 NOTE — TELEPHONE ENCOUNTER
Result Communication    Resulted Orders   Surgical Pathology Exam   Result Value Ref Range    Case Report       Surgical Pathology                                Case: [id number]                                  Authorizing Provider:  Michelle Dupree MD            Collected:           06/25/2025 1321              Ordering Location:     [facility]       Received:            06/25/2025 1430                                     Center                                                                       Pathologist:           Oracio Jarrett MD                                                            Specimen:    BREAST CORE BIOPSY RIGHT, Right breast calcifications                                      FINAL DIAGNOSIS       A. Right breast calcifications, stereotactic guided core biopsy:   --  Ductal carcinoma in situ, intermediate nuclear grade, solid and cribriform patterns with necrosis and calcifications, see note.    Note:  ER will be reported in a synoptic report.                 By the signature on this report, the individual or group listed as making the Final Interpretation/Diagnosis certifies that they have reviewed this case.       Comment       The case was reviewed at [facility] Conference via Zoom with concordance.      Case Summary Report       Breast Biomarker Reporting Template   BREAST BIOMARKER REPORTING TEMPLATE - All Specimens   Protocol posted: 12/13/2023         Test(s) Performed:            Estrogen Receptor (ER) Status:    Positive (greater than 10% of cells demonstrate nuclear positivity)           Percentage of Cells with Nuclear Positivity:    %           Average Intensity of Staining:    Strong         Test Type:    Food and Drug Administration (FDA) cleared (test / vendor): Roche CONFIRM anti-(ER) (SP1) Rabbit Monoclonal Primary Antibody, Roche Multimer Detection         Primary Antibody:    SP1         Scoring System:    No separate scoring system used       Cold  "Ischemia and Fixation Times:    Meet requirements specified in latest version of the ASCO / CAP Guidelines       Testing Performed on Block Number(s):    A1       METHODS      Fixative:    Formalin       Image Analysis:    Not performed          Comment(s):    Analysis is performed on ductal carcinoma in situ.       Block for Additional Biomarkers/Molecular Studies       Tumor Block: A1      Clinical History       Right breast stereotactic vacuum assisted biopsy of calcifications for extent.      Gross Description       A: Received in formalin, labeled with the patient´s name and hospital number and \"right breast calcifications\", are multiple irregular segments of yellow-white fatty soft tissue aggregating to 3.0 x 2.5 x 0.5 cm.  The specimen is submitted in toto in 2 cassettes.  KE    NOTE:  Ischemia time: 6/25/2025  13:21  This specimen was placed into formalin at: 6/25/2025  13:23      Disclaimer       One or more of the reagents used to perform assays on this specimen MAY have contained components considered to be analyte specific reagents (ASR's).  ASR's have not been cleared or approved by the U.S. Food and Drug Administration.  These assays were developed and their performance characteristics determined by the Department of Pathology at [facility]. The FDA does not require this test to go through premarket FDA review. This test is used for clinical purposes. It should not be regarded as investigational or for research. This laboratory is certified under the Clinical Laboratory Improvement Amendments (CLIA) as qualified to perform high complexity clinical laboratory testing.  The assays were performed with appropriate positive and negative controls which stained appropriately.         9:13 AM      Results were successfully communicated with the patient and they acknowledged their understanding.  Spoke with pt. She is still interested in lumpectomy. Understands risk of " deformity of the breast. Will schedule right bracketed magseed lumpectomy

## 2025-07-07 DIAGNOSIS — D05.11 DUCTAL CARCINOMA IN SITU (DCIS) OF RIGHT BREAST: ICD-10-CM

## 2025-07-09 ENCOUNTER — TELEMEDICINE (OUTPATIENT)
Dept: SURGERY | Facility: CLINIC | Age: 60
End: 2025-07-09
Payer: COMMERCIAL

## 2025-07-09 DIAGNOSIS — Z98.84 BARIATRIC SURGERY STATUS: ICD-10-CM

## 2025-07-09 DIAGNOSIS — K44.9 PARAESOPHAGEAL HERNIA WITH GASTROESOPHAGEAL REFLUX: Primary | ICD-10-CM

## 2025-07-09 DIAGNOSIS — K21.9 PARAESOPHAGEAL HERNIA WITH GASTROESOPHAGEAL REFLUX: Primary | ICD-10-CM

## 2025-07-09 PROCEDURE — 99215 OFFICE O/P EST HI 40 MIN: CPT | Mod: 95 | Performed by: SURGERY

## 2025-07-09 PROCEDURE — 99215 OFFICE O/P EST HI 40 MIN: CPT | Performed by: SURGERY

## 2025-07-09 NOTE — Clinical Note
Michelle vance,april let me know if the timing of the hiatal hernia repair is an issue.  Will do a few weeks after the lumpectomy and before the radiation

## 2025-07-10 ENCOUNTER — OFFICE VISIT (OUTPATIENT)
Dept: OTOLARYNGOLOGY | Facility: CLINIC | Age: 60
End: 2025-07-10
Payer: COMMERCIAL

## 2025-07-10 VITALS
DIASTOLIC BLOOD PRESSURE: 65 MMHG | BODY MASS INDEX: 30.82 KG/M2 | HEART RATE: 66 BPM | SYSTOLIC BLOOD PRESSURE: 95 MMHG | HEIGHT: 60 IN | WEIGHT: 157 LBS | TEMPERATURE: 97.9 F

## 2025-07-10 DIAGNOSIS — H93.8X3 SENSATION OF PLUGGED EAR ON BOTH SIDES: ICD-10-CM

## 2025-07-10 DIAGNOSIS — H61.23 BILATERAL IMPACTED CERUMEN: ICD-10-CM

## 2025-07-10 DIAGNOSIS — J30.9 ALLERGIC RHINITIS, UNSPECIFIED SEASONALITY, UNSPECIFIED TRIGGER: Primary | ICD-10-CM

## 2025-07-10 PROBLEM — K44.9 PARAESOPHAGEAL HERNIA WITH GASTROESOPHAGEAL REFLUX: Status: ACTIVE | Noted: 2025-07-09

## 2025-07-10 PROBLEM — K21.9 PARAESOPHAGEAL HERNIA WITH GASTROESOPHAGEAL REFLUX: Status: ACTIVE | Noted: 2025-07-09

## 2025-07-10 PROCEDURE — 69210 REMOVE IMPACTED EAR WAX UNI: CPT | Performed by: NURSE PRACTITIONER

## 2025-07-10 PROCEDURE — 69210 REMOVE IMPACTED EAR WAX UNI: CPT | Mod: 50 | Performed by: NURSE PRACTITIONER

## 2025-07-10 PROCEDURE — 99213 OFFICE O/P EST LOW 20 MIN: CPT | Mod: 25 | Performed by: NURSE PRACTITIONER

## 2025-07-10 PROCEDURE — 99203 OFFICE O/P NEW LOW 30 MIN: CPT | Performed by: NURSE PRACTITIONER

## 2025-07-10 PROCEDURE — 1036F TOBACCO NON-USER: CPT | Performed by: NURSE PRACTITIONER

## 2025-07-10 PROCEDURE — 3008F BODY MASS INDEX DOCD: CPT | Performed by: NURSE PRACTITIONER

## 2025-07-10 SDOH — ECONOMIC STABILITY: FOOD INSECURITY: WITHIN THE PAST 12 MONTHS, YOU WORRIED THAT YOUR FOOD WOULD RUN OUT BEFORE YOU GOT MONEY TO BUY MORE.: NEVER TRUE

## 2025-07-10 SDOH — ECONOMIC STABILITY: FOOD INSECURITY: WITHIN THE PAST 12 MONTHS, THE FOOD YOU BOUGHT JUST DIDN'T LAST AND YOU DIDN'T HAVE MONEY TO GET MORE.: NEVER TRUE

## 2025-07-10 ASSESSMENT — ENCOUNTER SYMPTOMS
DEPRESSION: 0
LOSS OF SENSATION IN FEET: 0
OCCASIONAL FEELINGS OF UNSTEADINESS: 0

## 2025-07-10 ASSESSMENT — PATIENT HEALTH QUESTIONNAIRE - PHQ9
2. FEELING DOWN, DEPRESSED OR HOPELESS: NOT AT ALL
1. LITTLE INTEREST OR PLEASURE IN DOING THINGS: NOT AT ALL
SUM OF ALL RESPONSES TO PHQ9 QUESTIONS 1 AND 2: 0

## 2025-07-10 ASSESSMENT — PAIN SCALES - GENERAL: PAINLEVEL_OUTOF10: 0-NO PAIN

## 2025-07-10 ASSESSMENT — LIFESTYLE VARIABLES
HOW OFTEN DO YOU HAVE A DRINK CONTAINING ALCOHOL: 2-3 TIMES A WEEK
HOW MANY STANDARD DRINKS CONTAINING ALCOHOL DO YOU HAVE ON A TYPICAL DAY: 1 OR 2
SKIP TO QUESTIONS 9-10: 1
HOW OFTEN DO YOU HAVE SIX OR MORE DRINKS ON ONE OCCASION: NEVER
AUDIT-C TOTAL SCORE: 3

## 2025-07-10 ASSESSMENT — COLUMBIA-SUICIDE SEVERITY RATING SCALE - C-SSRS
2. HAVE YOU ACTUALLY HAD ANY THOUGHTS OF KILLING YOURSELF?: NO
1. IN THE PAST MONTH, HAVE YOU WISHED YOU WERE DEAD OR WISHED YOU COULD GO TO SLEEP AND NOT WAKE UP?: NO
6. HAVE YOU EVER DONE ANYTHING, STARTED TO DO ANYTHING, OR PREPARED TO DO ANYTHING TO END YOUR LIFE?: NO

## 2025-07-10 NOTE — PROGRESS NOTES
Subjective   Patient ID: Kasey Duarte is a 60 y.o. female who presents for ear cleaning.    HPI  Patient here for ear cleaning. They feel blocked. Denies ear pain and drainage. The ears are itching.   Last cleaning was 6/15/2022 by Marli Langley.     She is also requesting another allergy referral, Marli ordered this in 2022 but patient was unable to see them at that time.     I reviewed patient's past medical and surgical history.  Problem List[1]  Surgical History[2]    Review of Systems    All other systems have been reviewed and are negative for complaints except for those mentioned in history of present illness, past medical history and problem list.    Objective   Physical Exam    Constitutional: No fever, chills, weight loss or weight gain  General appearance: Appears well, well-nourished, well groomed. No acute distress.    Communication: Normal communication    Psychiatric: Oriented to person, place and time. Normal mood and affect.    Neurologic: Cranial nerves II-XII grossly intact and symmetric bilaterally.    Head and Face:  Head: Atraumatic with no masses, lesions or scarring.  Face: Normal symmetry. No scars or deformities.  TMJ: Normal, no trismus.    Eyes: Conjunctiva not edematous or erythematous.     Right Ear: External inspection of ear with no deformity, scars, or masses. EAC is impacted with cerumen, TM not visible.     Left Ear: External inspection of ear with no deformity, scars, or masses. EAC is impacted with cerumen, TM not visible.     Nose: External inspection of nose: No nasal lesions, lacerations or scars. Anterior rhinoscopy with limited visualization past the inferior turbinates. No tenderness on frontal or maxillary sinus palpation.    Oral Cavity/Mouth: Oral cavity and oropharynx mucosa moist and pink. No lesions or masses. Tonsils appear surgically removed. Uvula is midline. Tongue with no masses or lesions. Tongue with good mobility. The oropharynx is clear.    Neck: Normal appearing,  symmetric, trachea midline.     Cardiovascular: Examination of peripheral vascular system shows no clubbing or cyanosis.    Respiratory: No respiratory distress increased work of breathing. Inspection of the chest with symmetric chest expansion and normal respiratory effort.    Skin: No head and neck rashes.    Lymph nodes: No adenopathy.    Procedure: Cerumen Removal  Indication: Cerumen Impaction  Risks, benefits, alternatives, and expectations discussed with patient and patient wishes to proceed.    Bilateral canals with cerumen impaction.  Using the microscope, suction, and alligator forceps, large amounts of soft brown cerumen removed bilaterally. Both TMs intact. No effusions or retractions noted.  Patient tolerated procedure well.     Assessment/Plan   Diagnoses and all orders for this visit:  Allergic rhinitis, unspecified seasonality, unspecified trigger  -     Referral to Allergy; Future  Bilateral impacted cerumen  Sensation of plugged ear on both sides    Ears were successfully cleaned. She will follow up with Marli Merrill at [place] in 6 months for ear cleaning. I let her know if she feels blocked sooner to call the office.     Allergy referral placed.     All questions answered to patient satisfaction.     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    Chronic rhinitis    Bilateral impacted cerumen    Decreased sex drive    Dysuria    Hypoactive sexual desire disorder    Myopia of both eyes with astigmatism and presbyopia    Nasal turbinate hypertrophy    Pyelonephritis    Rhinorrhea    Vaginal atrophy    Vitamin D deficiency    History of hysterectomy, supracervical    Back pain    Erosion of implanted vaginal mesh and prosthetic materials    STEF (obstructive sleep apnea)    Bariatric surgery status    Hiatal hernia    Ductal carcinoma in situ (DCIS) of right breast    Paraesophageal hernia with  gastroesophageal reflux   [2]   Past Surgical History:  Procedure Laterality Date     SECTION, CLASSIC  2014     Section    HYSTERECTOMY  2014    Open supracervical hyst w/ Dr. Tanner     OTHER SURGICAL HISTORY  2020    OTHER SURGICAL HISTORY  2014    Arthrotomy Of Knee    OTHER SURGICAL HISTORY  2014    Laparoscopic Sling Operation For Stress Incontinence    OTHER SURGICAL HISTORY  2014    Foot Surgery Left    OTHER SURGICAL HISTORY  2014    Ovarian Cystectomy Right    TONSILLECTOMY  2014    Tonsillectomy    UMBILICAL HERNIA REPAIR  2014    with mesh

## 2025-07-11 ENCOUNTER — HOSPITAL ENCOUNTER (OUTPATIENT)
Dept: RADIOLOGY | Facility: CLINIC | Age: 60
Discharge: HOME | End: 2025-07-11
Payer: COMMERCIAL

## 2025-07-11 DIAGNOSIS — C50.919 BREAST CANCER: ICD-10-CM

## 2025-07-11 DIAGNOSIS — D05.11 DUCTAL CARCINOMA IN SITU (DCIS) OF RIGHT BREAST: ICD-10-CM

## 2025-07-11 PROCEDURE — 19282 PERQ DEVICE BREAST EA IMAG: CPT | Mod: RT

## 2025-07-11 PROCEDURE — 19281 PERQ DEVICE BREAST 1ST IMAG: CPT | Mod: RT

## 2025-07-11 PROCEDURE — 2500000004 HC RX 250 GENERAL PHARMACY W/ HCPCS (ALT 636 FOR OP/ED): Performed by: STUDENT IN AN ORGANIZED HEALTH CARE EDUCATION/TRAINING PROGRAM

## 2025-07-11 PROCEDURE — 77065 DX MAMMO INCL CAD UNI: CPT | Mod: RIGHT SIDE | Performed by: STUDENT IN AN ORGANIZED HEALTH CARE EDUCATION/TRAINING PROGRAM

## 2025-07-11 PROCEDURE — C1739 HC OR 278 NO HCPCS: HCPCS

## 2025-07-11 PROCEDURE — 2780000003 HC OR 278 NO HCPCS

## 2025-07-11 RX ADMIN — Medication 22 ML: at 10:35

## 2025-07-11 ASSESSMENT — PAIN SCALES - GENERAL
PAINLEVEL_OUTOF10: 4
PAINLEVEL_OUTOF10: 6
PAINLEVEL_OUTOF10: 8
PAINLEVEL_OUTOF10: 0 - NO PAIN

## 2025-07-11 ASSESSMENT — PAIN - FUNCTIONAL ASSESSMENT: PAIN_FUNCTIONAL_ASSESSMENT: 0-10

## 2025-07-11 NOTE — PATIENT INSTRUCTIONS
PLAN:  We will plan to fix your hiatal hernia after your lumpectomy is completed  You will be meeting with anesthesia prior to the procedure  He will meet with the dietitian to go over dietary instructions after the procedure  You will need to stop the semaglutide 2 weeks before the procedure        Dr. Darlene Blount M.D., MPH  Director of Bariatric & Minimally Invasive Surgery  [place] [address]  T:  [phone]  F:  [phone]     Shannon Zavaleta RN Assistant Nurse Manager, Care Coordinator Patient Nursing Contact  T: [phone] F: [phone]

## 2025-07-11 NOTE — Clinical Note
Third magseed placement completed.  Bandaids applied to all 3 sites.  Post procedure care reviewed with patient, ok to resume normal activity with no restrictions. Patient verbalized understanding and will follow up surgery as planned.

## 2025-07-11 NOTE — DISCHARGE INSTRUCTIONS
1030:Procedural steps explained and patient given opportunity to verbalize concerns and seek clarification.  Post procedure self-care and potential for bruising , hematoma, and pain reviewed.  Patient verbalizes understanding.      1030:Patient offered aromatherapy, warm blankets and music.   Guided imagery, touch and relaxation breathing to be used throughout the procedure.        1130:Post procedure care reviewed with patient, ok to resume normal activity with no restrictions. Patient verbalized understanding and will follow up surgery as planned.    1130:  Patient discharged ambulatory

## 2025-07-15 ENCOUNTER — TELEPHONE (OUTPATIENT)
Dept: CARDIAC SURGERY | Facility: HOSPITAL | Age: 60
End: 2025-07-15
Payer: COMMERCIAL

## 2025-07-17 ENCOUNTER — TELEPHONE (OUTPATIENT)
Dept: CARDIAC SURGERY | Facility: HOSPITAL | Age: 60
End: 2025-07-17
Payer: COMMERCIAL

## 2025-07-18 ENCOUNTER — DOCUMENTATION (OUTPATIENT)
Dept: SURGICAL ONCOLOGY | Facility: CLINIC | Age: 60
End: 2025-07-18
Payer: COMMERCIAL

## 2025-07-21 DIAGNOSIS — Z01.818 PREOPERATIVE CLEARANCE: ICD-10-CM

## 2025-07-21 DIAGNOSIS — Z98.84 BARIATRIC SURGERY STATUS: Primary | ICD-10-CM

## 2025-07-25 ENCOUNTER — APPOINTMENT (OUTPATIENT)
Dept: OBSTETRICS AND GYNECOLOGY | Facility: CLINIC | Age: 60
End: 2025-07-25
Payer: COMMERCIAL

## 2025-07-29 DIAGNOSIS — L29.9 EAR ITCHING: Primary | ICD-10-CM

## 2025-07-29 DIAGNOSIS — H93.8X3 SENSATION OF PLUGGED EAR ON BOTH SIDES: ICD-10-CM

## 2025-07-29 RX ORDER — FLUOCINOLONE ACETONIDE 0.11 MG/ML
OIL AURICULAR (OTIC)
Qty: 20 ML | Refills: 0 | Status: SHIPPED | OUTPATIENT
Start: 2025-07-29

## 2025-07-29 NOTE — PROGRESS NOTES
Pt 's HHR surgery set for 8/21. Spent this appointment reviewing the post op diet progression. Sent pt an email with diet information and answered all questions. Encouraged the pt to reach out to this RD if they have an additional questions.     Basia Andrade MS, RD, LD  Phone: [phone]

## 2025-07-30 ENCOUNTER — APPOINTMENT (OUTPATIENT)
Dept: SURGERY | Facility: CLINIC | Age: 60
End: 2025-07-30
Payer: COMMERCIAL

## 2025-07-30 NOTE — CPM/PAT H&P
CPM/PAT Evaluation       Name: Kasey Duarte (Kasey Duarte)  /Age: 1965/60 y.o.     Visit Type:   In-Person       Chief Complaint: Paraesophageal hernia with GERD    HPI 61 y/o female scheduled for diaphragmatic hernia repair on 2025 with  Dr. Blount secondary to Paraesophageal hernia with GERD  PMHX includes STEF, breast cancer.  PAT is consulted today for perioperative risk stratification and optimization.      Medical History[1]    Surgical History[2]    Patient Sexual activity questions deferred to the physician.    Family History[3]    Allergies[4]    Prior to Admission medications   Medication Sig Start Date End Date Taking? Authorizing Provider   ascorbic acid (Vitamin C) 250 mg tablet Take 1 tablet (250 mg) by mouth once daily in the morning. Take before meals. Take with iron tablet daily 25  Warren Lorenzo MD   cholecalciferol (Vitamin D-3) 50 mcg (2,000 units) tablet Take 1 tablet (50 mcg) by mouth once daily. 25  Warren Lorenzo MD   ferrous sulfate (Iron, ferrous sulfate,) 325 mg (65 mg elemental) tablet Take 1 tablet by mouth once daily with breakfast. 25  Warren Lorenzo MD   fluocinolone (DermOtic Oil) 0.01 % ear drops Administer 4 drops to each ear twice daily for the next 5 days and then as needed for itching 25   Mansi Parham, APRN-CNP   Intrarosa 6.5 mg insert INSERT 1 INSERT VAGINALLY EVERY NIGHT AT BEDTIME 25   Megan Tucker MD MPH   minoxidil (Loniten) 2.5 mg tablet Take 1 tablet (2.5 mg) by mouth once daily.    Historical Provider, MD   semaglutide 0.5 mg/0.1 mL syringe Inject 0.5 mg under the skin 1 (one) time per week.    Historical Provider, MD RAMIREZ ROS:   Constitutional:   neg    Neuro/Psych:   neg    Eyes:    use of corrective lenses  Ears:   neg    Nose:   Mouth:   Throat:   Neck:   Cardio:   neg    Respiratory:   neg    Endocrine:   GI:   neg    :   neg    Musculoskeletal:   neg    Hematologic:    history of blood  transfusion  Skin:      Physical Exam  Vitals reviewed.   Constitutional:       Appearance: Normal appearance.   HENT:      Head: Normocephalic and atraumatic.      Mouth/Throat:      Mouth: Mucous membranes are moist.     Eyes:      Extraocular Movements: Extraocular movements intact.       Cardiovascular:      Rate and Rhythm: Normal rate and regular rhythm.   Pulmonary:      Effort: Pulmonary effort is normal.      Breath sounds: Normal breath sounds.     Musculoskeletal:         General: Normal range of motion.      Cervical back: Normal range of motion.     Skin:     General: Skin is warm and dry.     Neurological:      General: No focal deficit present.      Mental Status: She is alert and oriented to person, place, and time.     Psychiatric:         Mood and Affect: Mood normal.         Behavior: Behavior normal.          Airway    Testing/Diagnostic:     Patient Specialist/PCP:     Visit Vitals  /71   Pulse 53   Temp 35.9 °C (96.6 °F) (Tympanic)   Resp 16   Ht (!) 1.524 m (5')   Wt 72 kg (158 lb 11.7 oz)   SpO2 100%   BMI 31.00 kg/m²   OB Status Hysterectomy   Smoking Status Never   BSA 1.75 m²       DASI Risk Score      Flowsheet Row Pre-Admission Testing from 7/31/2025 in [facility]   Can you take care of yourself (eat, dress, bathe, or use toilet)?  2.75 filed at 07/31/2025 0817   Can you walk indoors, such as around your house? 1.75 filed at 07/31/2025 0817   Can you walk a block or two on level ground?  2.75 filed at 07/31/2025 0817   Can you climb a flight of stairs or walk up a hill? 5.5 filed at 07/31/2025 0817   Can you run a short distance? 8 filed at 07/31/2025 0817   Can you do light work around the house like dusting or washing dishes? 2.7 filed at 07/31/2025 0817   Can you do moderate work around the house like vacuuming, sweeping floors or carrying groceries? 3.5 filed at 07/31/2025 0817   Can you do heavy work around the house like scrubbing floors or lifting and moving  heavy furniture?  8 filed at 07/31/2025 0817   Can you do yard work like raking leaves, weeding or pushing a mower? 4.5 filed at 07/31/2025 0817   Can you have sexual relations? 5.25 filed at 07/31/2025 0817   Can you participate in moderate recreational activities like golf, bowling, dancing, doubles tennis or throwing a baseball or football? 0 filed at 07/31/2025 0817   Can you participate in strenous sports like swimming, singles tennis, football, basketball, or skiing? 0 filed at 07/31/2025 0817   DASI SCORE 44.7 filed at 07/31/2025 0817   METS Score (Will be calculated only when all the questions are answered) 8.2 filed at 07/31/2025 0817     Caprini DVT Assessment      Flowsheet Row Pre-Admission Testing from 7/31/2025 in [facility]   DVT Score (IF A SCORE IS NOT CALCULATING, MUST SELECT A BMI TO COMPLETE) 7 filed at 07/31/2025 0835   Surgical Factors Major surgery planned, including arthroscopic and laproscopic (1-2 hours) filed at 07/31/2025 0835   BMI (BMI MUST BE CHOSEN) 31-40 (Obesity) filed at 07/31/2025 0835     Modified Frailty Index    No data to display  [id number] Stroke Risk Points  Current as of just now        N/A 0 to 9 Points:      Last Change: N/A          The [id number] risk score (Lip GH, et al. 2009. © 2010 [employer]) quantifies the risk of stroke for a patient with atrial fibrillation. For patients without atrial fibrillation or under the age of 18 this score appears as N/A. Higher score values generally indicate higher risk of stroke.        This score is not applicable to this patient. Components are not calculated.          Revised Cardiac Risk Index      Flowsheet Row Pre-Admission Testing from 7/31/2025 in [facility]   High-Risk Surgery (Intraperitoneal, Intrathoracic,Suprainguinal vascular) 0 filed at 07/31/2025 0841   History of ischemic heart disease (History of MI, History of positive exercuse test, Current chest paint  considered due to myocardial ischemia, Use of nitrate therapy, ECG with pathological Q Waves) 0 filed at 07/31/2025 0841   History of congestive heart failure (pulmonary edemia, bilateral rales or S3 gallop, Paroxysmal nocturnal dyspnea, CXR showing pulmonary vascular redistribution) 0 filed at 07/31/2025 0841   History of cerebrovascular disease (Prior TIA or stroke) 0 filed at 07/31/2025 0841   Pre-operative insulin treatment 0 filed at 07/31/2025 0841   Pre-operative creatinine>2 mg/dl 0 filed at 07/31/2025 0841   Revised Cardiac Risk Calculator 0 filed at 07/31/2025 0841     Apfel Simplified Score      Flowsheet Row Pre-Admission Testing from 7/31/2025 in [facility]   Smoking status 1 filed at 07/31/2025 0845   History of motion sickness or PONV  1 filed at 07/31/2025 0845   Use of postoperative opioids 1 filed at 07/31/2025 0845   Gender - Female 1=Yes filed at 07/31/2025 0845   Apfel Simplified Score Calculator 4 filed at 07/31/2025 0845     Risk Analysis Index Results This Encounter    No data found in the last 10 encounters.       Stop Bang Score      Flowsheet Row Pre-Admission Testing from 7/31/2025 in [facility]   Do you snore loudly? 0 filed at 07/31/2025 0816   Do you often feel tired or fatigued after your sleep? 0 filed at 07/31/2025 0816   Has anyone ever observed you stop breathing in your sleep? 0 filed at 07/31/2025 0816   Do you have or are you being treated for high blood pressure? 0 filed at 07/31/2025 0816   Recent BMI (Calculated) 30.7 filed at 07/31/2025 0816   Is BMI greater than 35 kg/m2? 0=No filed at 07/31/2025 0816   Age older than 50 years old? 1=Yes filed at 07/31/2025 0816   Is your neck circumference greater than 17 inches (Male) or 16 inches (Female)? 0 filed at 07/31/2025 0816   Gender - Male 0=No filed at 07/31/2025 0816   STOP-BANG Total Score 1 filed at 07/31/2025 0816     Prodigy: High Risk  Total Score: 8              Prodigy Age Score            ARISCAT Score for Postoperative Pulmonary Complications      Flowsheet Row Pre-Admission Testing from 7/31/2025 in [facility]   Age Calculated Score 3 filed at 07/31/2025 0846   Preoperative SpO2 0 filed at 07/31/2025 0846   Respiratory infection in the last month Either upper or lower (i.e., URI, bronchitis, pneumonia), with fever and antibiotic treatment 0 filed at 07/31/2025 0846   Preoperative anemia (Hgb less than 10 g/dl) 0 filed at 07/31/2025 0846   Surgical incision  15 filed at 07/31/2025 0846   Duration of surgery  16 filed at 07/31/2025 0846   Emergency Procedure  0 filed at 07/31/2025 0846   ARISCAT Total Score  34 filed at 07/31/2025 0846     Philipp Perioperative Risk for Myocardial Infarction or Cardiac Arrest (CRISSY)      Flowsheet Row Pre-Admission Testing from 7/31/2025 in [facility]   Calculated Age Score 1.22 filed at 07/31/2025 0846   Functional Status  0 filed at 07/31/2025 0846   ASA Class  -3.29 filed at 07/31/2025 0846   Creatinine 0 filed at 07/31/2025 0846   Type of Procedure  1.13 filed at 07/31/2025 0846   CRISSY Total Score  -6.19 filed at 07/31/2025 0846   CRISSY % 0.2 filed at 07/31/2025 0846           Assessment and Plan:       HPI 61 y/o female scheduled for diaphragmatic hernia repair on 8/21/2025 with  Dr. Blount secondary to Paraesophageal hernia with GERD  PMHX includes STEF, breast cancer.  PAT is consulted today for perioperative risk stratification and optimization.    Neuro:  No neurologic diagnosis, however, the patient is at increased risk for perioperative delirium secondary to  polypharmacy  Patient is not at increased risk for perioperative CVA      HEENT:  No HEENT diagnosis or significant findings on chart review or clinical presentation and evaluation. No further preoperative testing/intervention indicated at this time.    Cardiovascular:  No CV diagnosis or significant findings on chart review or clinical presentation and evaluation. No  further preoperative testing or intervention is indicated at this time.  METS: 8.2  RCRI: 0 points, 3.9%  risk for postoperative MACE       Pulmonary:  No pulmonary diagnosis, however patient is at increased risk of perioperative complications secondary to  age > 60  Stop Bang score is 1 placing patient at low risk for STEF  PRODIGY: Low risk for opioid induced respiratory depression  Pumonary education discussed, patient also provided deep breathing exerciseswith educational handout    Renal:   No renal diagnosis, however patient is at increase risk for perioperative renal complications secondary to  Age equal to or greater than 56      Endocrine:  No endocrine diagnosis or significant findings on chart review or clinical presentation and evaluation. No further testing or intervention is indicated at this time.    Hematologic:  No hematologic diagnosis, however patient is at an increased risk for DVT  Caprini Score 7, patient at High risk for perioperative DVT.  Patient provided with VTE education/handout.      Gastrointestinal:   Telemedicine visit with Dr. Blount on 7/9/2025 for paraesophageal hernia with GERD  Ozempic Held 1 day prior to procedure  GLP fasting instructions given to pt  Plan for diaphragmatic hernia repair   Apfel 4    Infectious disease:   No infectious diagnosis or significant findings on chart review or clinical presentation and evaluation.   Prescription provided for CHG body wash and dental rinse. CHG use instructions reviewed and provided to patient.  Staph screen collected    Musculoskeletal:   No diagnosis or significant findings on chart review or clinical presentation and evaluation.     Anesthesia/Airway:  PONV      Medication instructions and NPO guidelines reviewed with the patient.  All questions or concerns discussed and addressed.      Labs and EKG ordered                  [1]   Past Medical History:  Diagnosis Date    Anemia     Arthropathy, unspecified 05/07/2018    Arthropathy     Breast cancer     right    Erosion of implanted vaginal mesh and prosthetic materials     Esophageal dysphagia     Hiatal hernia     History of blood transfusion     total of 4 units with hysterectomy    Hypoactive sexual desire disorder 2020    Hypoactive sexual desire disorder    Iron deficiency     PONV (postoperative nausea and vomiting)     with hysterectomy    Postmenopausal atrophic vaginitis 2020    Vaginal atrophy    Status post abdominal supracervical subtotal hysterectomy     Status post bariatric surgery    [2]   Past Surgical History:  Procedure Laterality Date    BARIATRIC SURGERY      s/p sleeve, at [facility]     SECTION, CLASSIC  2014     Section    ESOPHAGOSCOPY / EGD  2025    5 cm HH, Random BiopsyErythematous Mucosa Antrum @ [facility] [place] w/Dr. JEANNIE Blount    HYSTERECTOMY  2014    Open supracervical hyst w/ Dr. Tanner , discharged then readmitted with sepsis for approx 5 days    OTHER SURGICAL HISTORY Bilateral 2014    Arthrotomy Of Knee    OTHER SURGICAL HISTORY  2014    Laparoscopic Sling Operation For Stress Incontinence    OTHER SURGICAL HISTORY  2014    Foot Surgery Left    OTHER SURGICAL HISTORY  2014    Ovarian Cystectomy Right    TONSILLECTOMY  2014    Tonsillectomy    UMBILICAL HERNIA REPAIR  2014    with mesh   [3]   Family History  Problem Relation Name Age of Onset    Coronary artery disease Mother      Diabetes Mother      Hypertension Mother          essential    Kidney disease Mother      Other (myocardial infarction) Mother      Diabetes type II Mother      Prostate cancer Father      Coronary artery disease Father      Hypertension Father          essential    Stroke Father      Hypertension Sister          essential    Throat cancer Sister      Hypertension Brother Isai 20 - 29    Hypertension Daughter      Other (malignant neoplasm of tonsil) Daughter      Diabetes Maternal Grandmother      Other (malignant  "neoplasm of stomach) Maternal Grandmother      Lymphoma Paternal Grandfather     [4]   Allergies  Allergen Reactions    Nsaids (Non-Steroidal Anti-Inflammatory Drug) Hives     Cannot take due to bariatric surgery    Penicillins Anaphylaxis    Propoxyphene N-Acetaminophen Other     Violent migraine per pt    \"Darvocet\"     "

## 2025-07-31 ENCOUNTER — PRE-ADMISSION TESTING (OUTPATIENT)
Dept: PREADMISSION TESTING | Facility: HOSPITAL | Age: 60
End: 2025-07-31
Payer: COMMERCIAL

## 2025-07-31 ENCOUNTER — ANESTHESIA EVENT (OUTPATIENT)
Dept: OPERATING ROOM | Facility: HOSPITAL | Age: 60
End: 2025-07-31
Payer: COMMERCIAL

## 2025-07-31 ENCOUNTER — HOSPITAL ENCOUNTER (OUTPATIENT)
Dept: RADIOLOGY | Facility: HOSPITAL | Age: 60
Discharge: HOME | End: 2025-07-31
Payer: COMMERCIAL

## 2025-07-31 ENCOUNTER — CLINICAL SUPPORT (OUTPATIENT)
Dept: PREADMISSION TESTING | Facility: HOSPITAL | Age: 60
End: 2025-07-31
Payer: COMMERCIAL

## 2025-07-31 VITALS
SYSTOLIC BLOOD PRESSURE: 106 MMHG | HEART RATE: 53 BPM | DIASTOLIC BLOOD PRESSURE: 71 MMHG | RESPIRATION RATE: 16 BRPM | BODY MASS INDEX: 31.16 KG/M2 | OXYGEN SATURATION: 100 % | HEIGHT: 60 IN | WEIGHT: 158.73 LBS | TEMPERATURE: 96.6 F

## 2025-07-31 DIAGNOSIS — Z01.818 PREOPERATIVE EXAMINATION: Primary | ICD-10-CM

## 2025-07-31 DIAGNOSIS — K44.9 PARAESOPHAGEAL HERNIA WITH GASTROESOPHAGEAL REFLUX: ICD-10-CM

## 2025-07-31 DIAGNOSIS — Z01.818 PREOPERATIVE CLEARANCE: ICD-10-CM

## 2025-07-31 DIAGNOSIS — Z98.84 BARIATRIC SURGERY STATUS: ICD-10-CM

## 2025-07-31 DIAGNOSIS — K21.9 PARAESOPHAGEAL HERNIA WITH GASTROESOPHAGEAL REFLUX: ICD-10-CM

## 2025-07-31 LAB
ABO GROUP (TYPE) IN BLOOD: NORMAL
ALBUMIN SERPL BCP-MCNC: 4 G/DL (ref 3.4–5)
ALP SERPL-CCNC: 40 U/L (ref 33–136)
ALT SERPL W P-5'-P-CCNC: 11 U/L (ref 7–45)
ANION GAP SERPL CALC-SCNC: 8 MMOL/L (ref 10–20)
ANTIBODY SCREEN: NORMAL
APPEARANCE UR: CLEAR
AST SERPL W P-5'-P-CCNC: 14 U/L (ref 9–39)
BASOPHILS # BLD AUTO: 0.04 X10*3/UL (ref 0–0.1)
BASOPHILS NFR BLD AUTO: 0.8 %
BILIRUB SERPL-MCNC: 0.4 MG/DL (ref 0–1.2)
BILIRUB UR STRIP.AUTO-MCNC: NEGATIVE MG/DL
BUN SERPL-MCNC: 11 MG/DL (ref 6–23)
CALCIUM SERPL-MCNC: 8.7 MG/DL (ref 8.6–10.3)
CHLORIDE SERPL-SCNC: 103 MMOL/L (ref 98–107)
CO2 SERPL-SCNC: 30 MMOL/L (ref 21–32)
COLOR UR: NORMAL
CREAT SERPL-MCNC: 0.85 MG/DL (ref 0.5–1.05)
EGFRCR SERPLBLD CKD-EPI 2021: 78 ML/MIN/1.73M*2
EOSINOPHIL # BLD AUTO: 0.07 X10*3/UL (ref 0–0.7)
EOSINOPHIL NFR BLD AUTO: 1.4 %
ERYTHROCYTE [DISTWIDTH] IN BLOOD BY AUTOMATED COUNT: 14.7 % (ref 11.5–14.5)
GLUCOSE SERPL-MCNC: 88 MG/DL (ref 74–99)
GLUCOSE UR STRIP.AUTO-MCNC: NORMAL MG/DL
HCT VFR BLD AUTO: 40.1 % (ref 36–46)
HGB BLD-MCNC: 12.8 G/DL (ref 12–16)
IMM GRANULOCYTES # BLD AUTO: 0.01 X10*3/UL (ref 0–0.7)
IMM GRANULOCYTES NFR BLD AUTO: 0.2 % (ref 0–0.9)
INR PPP: 1.1 (ref 0.9–1.1)
KETONES UR STRIP.AUTO-MCNC: NEGATIVE MG/DL
LEUKOCYTE ESTERASE UR QL STRIP.AUTO: NEGATIVE
LYMPHOCYTES # BLD AUTO: 1.98 X10*3/UL (ref 1.2–4.8)
LYMPHOCYTES NFR BLD AUTO: 39.8 %
MCH RBC QN AUTO: 27.2 PG (ref 26–34)
MCHC RBC AUTO-ENTMCNC: 31.9 G/DL (ref 32–36)
MCV RBC AUTO: 85 FL (ref 80–100)
MONOCYTES # BLD AUTO: 0.38 X10*3/UL (ref 0.1–1)
MONOCYTES NFR BLD AUTO: 7.6 %
NEUTROPHILS # BLD AUTO: 2.5 X10*3/UL (ref 1.2–7.7)
NEUTROPHILS NFR BLD AUTO: 50.2 %
NITRITE UR QL STRIP.AUTO: NEGATIVE
NRBC BLD-RTO: 0 /100 WBCS (ref 0–0)
PH UR STRIP.AUTO: 7 [PH]
PLATELET # BLD AUTO: 300 X10*3/UL (ref 150–450)
POTASSIUM SERPL-SCNC: 4.1 MMOL/L (ref 3.5–5.3)
PROT SERPL-MCNC: 6.3 G/DL (ref 6.4–8.2)
PROT UR STRIP.AUTO-MCNC: NEGATIVE MG/DL
PROTHROMBIN TIME: 11.8 SECONDS (ref 9.8–12.4)
RBC # BLD AUTO: 4.71 X10*6/UL (ref 4–5.2)
RBC # UR STRIP.AUTO: NEGATIVE MG/DL
RH FACTOR (ANTIGEN D): NORMAL
SODIUM SERPL-SCNC: 137 MMOL/L (ref 136–145)
SP GR UR STRIP.AUTO: 1.02
UROBILINOGEN UR STRIP.AUTO-MCNC: NORMAL MG/DL
WBC # BLD AUTO: 5 X10*3/UL (ref 4.4–11.3)

## 2025-07-31 PROCEDURE — 81003 URINALYSIS AUTO W/O SCOPE: CPT | Mod: 59

## 2025-07-31 PROCEDURE — 86901 BLOOD TYPING SEROLOGIC RH(D): CPT

## 2025-07-31 PROCEDURE — 85025 COMPLETE CBC W/AUTO DIFF WBC: CPT | Performed by: STUDENT IN AN ORGANIZED HEALTH CARE EDUCATION/TRAINING PROGRAM

## 2025-07-31 PROCEDURE — 80323 ALKALOIDS NOS: CPT

## 2025-07-31 PROCEDURE — 80053 COMPREHEN METABOLIC PANEL: CPT | Performed by: STUDENT IN AN ORGANIZED HEALTH CARE EDUCATION/TRAINING PROGRAM

## 2025-07-31 PROCEDURE — 71045 X-RAY EXAM CHEST 1 VIEW: CPT

## 2025-07-31 PROCEDURE — 99205 OFFICE O/P NEW HI 60 MIN: CPT | Performed by: NURSE PRACTITIONER

## 2025-07-31 PROCEDURE — 85610 PROTHROMBIN TIME: CPT | Performed by: STUDENT IN AN ORGANIZED HEALTH CARE EDUCATION/TRAINING PROGRAM

## 2025-07-31 RX ORDER — CHLORHEXIDINE GLUCONATE 40 MG/ML
1 SOLUTION TOPICAL DAILY
Start: 2025-07-31 | End: 2025-07-31 | Stop reason: WASHOUT

## 2025-07-31 RX ORDER — CHLORHEXIDINE GLUCONATE ORAL RINSE 1.2 MG/ML
15 SOLUTION DENTAL DAILY
Qty: 30 ML | Refills: 0 | Status: SHIPPED | OUTPATIENT
Start: 2025-07-31 | End: 2025-07-31 | Stop reason: WASHOUT

## 2025-07-31 ASSESSMENT — DUKE ACTIVITY SCORE INDEX (DASI)
CAN YOU WALK INDOORS, SUCH AS AROUND YOUR HOUSE: YES
CAN YOU DO LIGHT WORK AROUND THE HOUSE LIKE DUSTING OR WASHING DISHES: YES
CAN YOU DO MODERATE WORK AROUND THE HOUSE LIKE VACUUMING, SWEEPING FLOORS OR CARRYING GROCERIES: YES
CAN YOU RUN A SHORT DISTANCE: YES
CAN YOU TAKE CARE OF YOURSELF (EAT, DRESS, BATHE, OR USE TOILET): YES
CAN YOU CLIMB A FLIGHT OF STAIRS OR WALK UP A HILL: YES
CAN YOU DO YARD WORK LIKE RAKING LEAVES, WEEDING OR PUSHING A MOWER: YES
CAN YOU WALK A BLOCK OR TWO ON LEVEL GROUND: YES
DASI METS SCORE: 8.2
CAN YOU HAVE SEXUAL RELATIONS: YES
TOTAL_SCORE: 44.7
CAN YOU PARTICIPATE IN STRENOUS SPORTS LIKE SWIMMING, SINGLES TENNIS, FOOTBALL, BASKETBALL, OR SKIING: NO
CAN YOU PARTICIPATE IN MODERATE RECREATIONAL ACTIVITIES LIKE GOLF, BOWLING, DANCING, DOUBLES TENNIS OR THROWING A BASEBALL OR FOOTBALL: NO
CAN YOU DO HEAVY WORK AROUND THE HOUSE LIKE SCRUBBING FLOORS OR LIFTING AND MOVING HEAVY FURNITURE: YES

## 2025-07-31 ASSESSMENT — ENCOUNTER SYMPTOMS
CONSTITUTIONAL NEGATIVE: 1
CARDIOVASCULAR NEGATIVE: 1
MUSCULOSKELETAL NEGATIVE: 1
RESPIRATORY NEGATIVE: 1
GASTROINTESTINAL NEGATIVE: 1
NEUROLOGICAL NEGATIVE: 1

## 2025-07-31 ASSESSMENT — PAIN - FUNCTIONAL ASSESSMENT: PAIN_FUNCTIONAL_ASSESSMENT: 0-10

## 2025-07-31 ASSESSMENT — PAIN SCALES - GENERAL: PAINLEVEL_OUTOF10: 0 - NO PAIN

## 2025-07-31 ASSESSMENT — LIFESTYLE VARIABLES: SMOKING_STATUS: NONSMOKER

## 2025-07-31 NOTE — PREPROCEDURE INSTRUCTIONS
Thank you for visiting Preadmission Testing (PAT) today for your pre-procedure evaluation, you were seen by     Sudha Brown CNP  Pre Admission Testing  [facility]  [phone]    This summary includes instructions and information to aid you during your perioperative period.  Please read carefully. If you have any questions about your visit today, please call the number listed above.    If you become ill or have any changes to your health before your surgery, please contact your primary care provider and alert your surgeon.      Preparing for your Surgery       Exercises  Preoperative Deep Breathing Exercises  Why it is important to do deep breathing exercises before my surgery?  Deep breathing exercises strengthen your breathing muscles.  This helps you to recover after your surgery and decreases the chance of breathing complications.  How are the deep breathing exercises done?  Sit straight with your back supported.  Breathe in deeply and slowly through your nose. Your lower rib cage should expand and your abdomen may move forward.  Hold that breath for 3 to 5 seconds.  Breathe out through pursed lips, slowly and completely.  Rest and repeat 10 times every hour while awake.  Rest longer if you become dizzy or lightheaded.       Preoperative Brain Exercises    What are brain exercises?  A brain exercise is any activity that engages your thinking (cognitive) skills.    What types of activities are considered brain exercises?  Jigsaw puzzles, crossword puzzles, word jumble, memory games, word search, and many more.  Many can be found free online or on your phone via a mobile kailey.    Why should I do brain exercises before my surgery?  More recent research has shown brain exercise before surgery can lower the risk of postoperative delirium (confusion) which can be especially important for older adults.  Patients who did brain exercises for 5 to 10 hours the days before surgery, cut their  risk of postoperative delirium in half up to 1 week after surgery.    Sit-to-Stand Exercise    What is the sit-to-stand exercise?  The sit-to-stand exercise strengthens the muscles of your lower body and muscles in the center of your body (core muscles for stability) helping to maintain and improve your strength and mobility.  How do I do the sit-to-stand exercise?  The goal is to do this exercise without using your arms or hands.  If this is too difficult, use your arms and hands or a chair with armrests to help slowly push yourself to the standing position and lower yourself back to the sitting position. As the movement becomes easier use your arms and hands less.    Steps to the sit-to-stand exercise  Sit up tall in a sturdy chair, knees bent, feet flat on the floor shoulder-width apart.  Shift your hips/pelvis forward in the chair to correctly position yourself for the next movement.  Lean forward at your hips.  Stand up straight putting equal weight on both feet.  Check to be sure you are properly aligned with the chair, in a slow controlled movement sit back down.  Repeat this exercise 10-15 times.  If needed you can do it fewer times until your strength improves.  Rest for 1 minute.  Do another 10-15 sit-to-stand exercises.  Try to do this in the morning and evening.        Instructions    Preoperative Fasting Guidelines    Why must I stop eating and drinking near surgery time?  With sedation, food or liquid in your stomach can enter your lungs causing serious complications  Food can increase nausea and vomiting  When do I need to stop eating and drinking before my surgery?     Why must I stop eating and drinking before surgery?     With anesthesia, food or liquid in your stomach can enter your lungs causing serious complications     GLP-1 medications can slow the movement of food through your stomach and intestines.  This further increases the risk of food entering your lungs with anesthesia     When do I  need to stop eating and drinking before my surgery?     To help ensure food has passed out of your stomach, START a clear liquid diet 24 hours before your surgery     On the day of your surgery/procedure, STOP all clear liquids 2 hours before your arrival time to the hospital/facility      A clear liquid diet consists of clear liquids and foods that melt into a clear liquid (i.e. gelatin) and excludes solid foods and liquids you cannot see through (i.e. milk). Clears can and should contain sugar to obtain a sufficient number of calories.  A clear liquid diet includes     Clear, fat-free broth     Clear nutritional drinks     Pulp-free popsicles, vegetable and fruit juice     Gelatin     Coffee and tea without creamer or milk     Clear soda and sports drinks       Diabetic Patients     Clear liquids should not be sugar-free      Check your blood glucose levels as you normally do     If you have symptoms of low blood glucose (shakiness, sweating, dizziness, confusion) or high blood glucose (dry mouth, excessive thirst, frequent urination, blurry vision), check your blood glucose level     For low blood glucose increase your consumption of sugar-containing clear liquid      For high blood glucose, decrease your consumption of sugar-containing clears and treat as you normally would     If symptoms persist seek medical attention                 Simple things you can do to help prevent blood clots     Blood clots are blockages that can form in the body's veins. When a blood clot forms in your deep veins, it may be called a deep vein thrombosis, or DVT for short. Blood clots can happen in any part of the body where blood flows, but they are most common in the arms and legs. If a piece of a blood clot breaks free and travels to the lungs, it is called a pulmonary embolus (PE). A PE can be a very serious problem.         Being in the hospital or having surgery can raise your chances of getting a blood clot because you may  not be well enough to move around as much as you normally do.         Ways you can help prevent blood clots in the hospital       Wearing SCDs  SCDs stands for Sequential Compression Devices.   SCDs are special sleeves that wrap around your legs. They attach to a pump that fills them with air to gently squeeze your legs every few minutes.  This helps return the blood in your legs to your heart.   SCDs should only be taken off when walking or bathing. SCDs may not be comfortable, but they can help save your life.              Pump SCD leg sleeves  Wearing compression stockings - if your doctor orders them. These special snug-fitting stockings gently squeeze your legs to help blood flow.       Walking. Walking helps move the blood in your legs.   If your doctor says it is ok, try walking the halls at least   5 times a day. Ask us to help you get up, so you don't fall.      Taking any blood-thinning medicines your doctor orders.              Ways you can help prevent blood clots at home         Wearing compression stockings - if your doctor orders them.   Walking - to help move the blood in your legs.    Taking any blood-thinning medicines your doctor orders.      Signs of a blood clot or PE    Tell your doctor or nurse right away if you have any of the problems listed below.         If you are at home, seek medical care right away. Call 911 for chest pain or problems breathing.            Signs of a blood clot (DVT) - such as pain, swelling, redness, or warmth in your arm or legs.  Signs of a pulmonary embolism (PE) - such as chest pain or feeling short of breath      Tobacco and Alcohol;  Do not drink alcohol or smoke within 24 hours of surgery.  It is best to quit smoking for as long as possible before any surgery or procedure.    Other Instructions  Why did I have my nose, under my arms, and groin swabbed? The purpose of the swab is to identify Staphylococcus aureus inside your nose or on your skin.  The swab was  "sent to the laboratory for culture.  A positive swab/culture for Staphylococcus aureus is called colonization or carriage.     What is Staphylococcus aureus? Staphylococcus aureus, also known as \"staph\", is a germ found on the skin or in the nose of healthy people.  Sometimes Staphylococcus aureus can get into the body and cause an infection.  This can be minor (such as pimples, boils, or other skin problems).  It might also be serious (such as a blood infection, pneumonia, or a surgical site infection).     What is Staphylococcus aureus colonization or carriage? Colonization or carriage means that a person has the germ but is not sick from it.  These bacteria can be spread on the hands or when breathing or sneezing.   How is Staphylococcus aureus spread? It is most often spread by close contact with a person or item that carries it.   What happens if my culture is positive for Staphylococcus aureus? Your doctor/medical team will use this information to guide any antibiotic treatment which may be necessary.  Regardless of the culture results, we will clean the inside of your nose with a betadine swab just before you have your surgery.   Will I get an infection if I have Staphylococcus aureus in my nose or on my skin? Anyone can get an infection with Staphylococcus aureus.  However, the best way to reduce your risk of infection is to follow the instructions provided to you for the use of your CHG soap and dental rinse.      Body Wash:     What is a home preoperative antibacterial shower? This shower is a way of cleaning the skin with a germ-killing solution before surgery.  The solution contains chlorhexidine, commonly known as CHG.  CHG is a skin cleanser with germ-killing ability.  Let your doctor know if you are allergic to chlorhexidine.   Why do I need to take a preoperative antibacterial shower? Skin is not sterile.  It is best to try to make your skin as free of germs as possible before surgery.  Proper " cleansing with a germ-killing soap before surgery can lower the number of germs on your skin.  This helps to reduce the risk of infection at the surgical site.    Following the instructions listed below will help you prepare your skin for surgery.   How do I use the solution?  If you plan to wash your hair, use your regular shampoo; then rinse your hair and body thoroughly to remove any shampoo residue  Wash your face with your regular soap or water only  Thoroughly rinse your body with water from the neck down  Apply Hibiclens directly on your skin or on a wet washcloth and wash gently; move away from the shower stream when applying Hibiclens to avoid rinsing it off too soon  Rinse thoroughly with warm water and keep out of eyes, ears and mouth; if Hibiclens comes in contact with these areas, rinse out promptly  Dry your skin with a towel  Do not use your regular soap after applying and rinsing with Hibiclens  Do not apply lotions or deodorants to the cleaned body area      Oral/Dental Rinse:     What is oral/dental rinse?  It is a mouthwash. It is a way of cleaning the mouth with a germ-killing solution before your surgery.  The solution contains chlorhexidine, commonly known as CHG. It is used inside the mouth to kill a bacteria known as Staphylococcus aureus.  Let your doctor know if you are allergic to Chlorhexidine.   Why do I need to use CHG oral/dental rinse? The CHG oral/dental rinse helps to kill a bacteria in your mouth known as Staphylococcus aureus.  This reduces the risk of infection at the surgical site.    Using your CHG oral/dental rinse STEPS: Use your CHG oral/dental rinse after you brush your teeth the night before (at bedtime) and the morning of your surgery.  Follow all directions on your prescription label.    Use the cap on the container to measure 15 ml.  Swish (gargle if you can) the mouthwash in your mouth for at least 30 seconds, (do not swallow) and spit out.  After you use your CHG  rinse, do not rinse your mouth with water, drink or eat.    Please refer to the prescription label for the appropriate time to resume oral intake   What side effects might I have using the CHG oral/dental rinse? CHG rinse will stick to plaque on the teeth.  Brush and floss just before use.  Teeth brushing will help avoid staining of plaque during use.      The Week before Surgery        Seven days before Surgery  Check your PAT medication instructions  Do the exercises provided to you by PAT  Arrange for a responsible, adult licensed  to take you home after surgery and stay with you for 24 hours.  You will not be permitted to drive yourself home if you have received any anesthetic/sedation  Six days before surgery  Check your PAT medication instructions  Do the exercises provided to you by PAT  Start using Chlorhexidene (CHG) body wash if prescribed  Five days before surgery  Check your PAT medication instructions  Do the exercises provided to you by PAT  Continue to use CHG body wash if prescribed  Three days before surgery  Check your PAT medication instructions  Do the exercises provided to you by PAT  Continue to use CHG body wash if prescribed  Two days before surgery  Check your PAT medication instructions  Do the exercises provided to you by PAT  Continue to use CHG body wash if prescribed    The Day before Surgery       Check your PAT medication and all other PAT instructions including when to stop eating and drinking  You will be called with your arrival time for surgery in the late afternoon.  If you do not receive a call please reach out to [facility] Pre-Op. [phone]  Do not smoke or drink 24 hours before surgery  Prepare items to bring with you to the hospital  Shower with your chlorhexidine wash if prescribed  Brush your teeth and use your chlorhexidine dental rinse if prescribed    The Day of Surgery       Check your PAT medication instructions  Ensure you follow the instructions for when to  stop eating and drinking  Shower, if prescribed use CHG.  Do not apply any lotions, creams, moisturizers, perfume or deodorant  Brush your teeth and use your CHG dental rinse if prescribed  Wear loose comfortable clothing  Avoid make-up  Remove  jewelry and piercings, consider professional piercing removal with a plastic spacer if needed  Bring photo ID and Insurance card  Bring an accurate medication list that includes medication dose, frequency and allergies  Bring a copy of your advanced directives (will, health care power of )  Bring any devices and controllers as well as medical devices you have been provided with for surgery (CPAP, slings, braces, etc.)  Dentures, eyeglasses, and contacts will be removed before surgery, please bring cases for contacts or glasses

## 2025-08-01 LAB — HOLD SPECIMEN: NORMAL

## 2025-08-04 ENCOUNTER — PRE-ADMISSION TESTING (OUTPATIENT)
Dept: PREADMISSION TESTING | Facility: HOSPITAL | Age: 60
End: 2025-08-04
Payer: COMMERCIAL

## 2025-08-04 ENCOUNTER — DOCUMENTATION (OUTPATIENT)
Dept: PREADMISSION TESTING | Facility: HOSPITAL | Age: 60
End: 2025-08-04

## 2025-08-04 VITALS
DIASTOLIC BLOOD PRESSURE: 66 MMHG | BODY MASS INDEX: 29.14 KG/M2 | HEART RATE: 69 BPM | SYSTOLIC BLOOD PRESSURE: 113 MMHG | RESPIRATION RATE: 16 BRPM | HEIGHT: 61 IN | TEMPERATURE: 97.5 F | WEIGHT: 154.32 LBS | OXYGEN SATURATION: 99 %

## 2025-08-04 ASSESSMENT — ENCOUNTER SYMPTOMS
ENDOCRINE NEGATIVE: 1
CONSTITUTIONAL NEGATIVE: 1
RESPIRATORY NEGATIVE: 1
NECK NEGATIVE: 1
EYES NEGATIVE: 1
MUSCULOSKELETAL NEGATIVE: 1
NEUROLOGICAL NEGATIVE: 1
GASTROINTESTINAL NEGATIVE: 1

## 2025-08-04 NOTE — H&P (VIEW-ONLY)
[facility] Evaluation       Name: Kasey Duarte (Kasey Duarte)  /Age: 1965/60 y.o.     In-Person           HPI        Date of Consult: 25    Referring Provider: Dr. Dupree    Surgery, Date, and Length: Right Breast Bracketed Lumpectomy with Magseed Localization - Right 25    Kasey Duarte is a 60 y.o. year-old female who presents to the [facility] for perioperative risk assessment prior to surgery.    Patient presents with a primary diagnosis of right breast cancer. She denies breast pain, breast lumps or nipple discharge. She denies any change in the skin of the breast or the contour of the breast.     She had a stereotactic core biopsy of a 1.9 cm area of calcifications in the right breast, upper inner aspect at anterior depth on 2025.   Pathology showed:   FINAL DIAGNOSIS   A. BREAST CALCIFICATIONS, RIGHT, UPPER INNER QUADRANT, CORE BIOPSY:      -- Ductal carcinoma in situ, intermediate to high nuclear grade, solid, cribriform, papillary and micropapillary patterns with comedonecrosis and microcalcifications       This note was created in part upon personal review of patient's medical records.      Patient is scheduled to have Right Breast Bracketed Lumpectomy with Magseed Localization - Right 25      Pt denies any past history of anesthetic complications such as PONV, awareness, prolonged sedation, dental damage, aspiration, cardiac arrest, difficult intubation, difficult I.V. access or unexpected hospital admissions.  NO malignant hyperthermia and or pseudocholinesterase deficiency.   history of blood transfusions     PONV-2    The patient is not a Rastafarian and will accept blood and blood products if medically indicated.   Type and screen not sent.     Past Medical History:   Diagnosis Date    Anemia     Arthropathy, unspecified 2018    Arthropathy    Breast cancer     right    Erosion of implanted vaginal mesh and prosthetic materials     Esophageal dysphagia     Hiatal hernia      History of blood transfusion     total of 4 units with hysterectomy    Hypoactive sexual desire disorder 2020    Hypoactive sexual desire disorder    Iron deficiency     Obesity     PONV (postoperative nausea and vomiting)     with hysterectomy    Postmenopausal atrophic vaginitis 2020    Vaginal atrophy    Status post abdominal supracervical subtotal hysterectomy     Status post bariatric surgery        Past Surgical History:   Procedure Laterality Date    BARIATRIC SURGERY      [facility]     SECTION, CLASSIC  2014     Section    ESOPHAGOSCOPY / EGD  2025    5 cm HH, [place] [facility] [place]    HYSTERECTOMY  2014    Open supracervical hyst w/ Dr. Tanner , discharged then readmitted with sepsis for approx 5 days    OTHER SURGICAL HISTORY Bilateral 2014    Arthrotomy Of Knee    OTHER SURGICAL HISTORY  2014    Laparoscopic Sling Operation For Stress Incontinence    OTHER SURGICAL HISTORY  2014    Foot Surgery Left    OTHER SURGICAL HISTORY  2014    Ovarian Cystectomy Right    TONSILLECTOMY  2014    Tonsillectomy    UMBILICAL HERNIA REPAIR  2014    with mesh       Social History     Tobacco Use    Smoking status: Never     Passive exposure: Never    Smokeless tobacco: Never   Vaping Use    Vaping status: Never Used   Substance Use Topics    Alcohol use: Yes     Alcohol/week: 4.0 standard drinks of alcohol     Types: 4 Glasses of wine per week     Comment: 4 glasses of wine a week per pt    Drug use: Never      Family History   Problem Relation Name Age of Onset    Coronary artery disease Mother      Diabetes Mother      Hypertension Mother          essential    Kidney disease Mother      Other (myocardial infarction) Mother      Diabetes type II Mother      Prostate cancer Father      Coronary artery disease Father      Hypertension Father          essential    Stroke Father       "Hypertension Sister          essential    Throat cancer Sister      Hypertension Brother Isai 20 - 29    Hypertension Daughter      Other (malignant neoplasm of tonsil) Daughter      Diabetes Maternal Grandmother      Other (malignant neoplasm of stomach) Maternal Grandmother      Lymphoma Paternal Grandfather         Allergies   Allergen Reactions    Nsaids (Non-Steroidal Anti-Inflammatory Drug) Other     Cannot take due to bariatric surgery    Penicillins Anaphylaxis    Propoxyphene N-Acetaminophen Other     Violent migraine per pt    \"Darvocet\"       Current Outpatient Medications:     ascorbic acid (Vitamin C) 250 mg tablet, Take 1 tablet (250 mg) by mouth once daily in the morning. Take before meals. Take with iron tablet daily, Disp: 30 tablet, Rfl: 2    [place] 6.5 mg insert, INSERT 1 INSERT VAGINALLY EVERY NIGHT AT BEDTIME, Disp: 28 each, Rfl: 3    minoxidil (Loniten) 2.5 mg tablet, Take 1 tablet (2.5 mg) by mouth once daily., Disp: , Rfl:     cholecalciferol (Vitamin D-3) 50 mcg (2,000 units) tablet, Take 1 tablet (50 mcg) by mouth once daily., Disp: 30 tablet, Rfl: 2    ferrous sulfate (Iron, ferrous sulfate,) 325 mg (65 mg elemental) tablet, Take 1 tablet by mouth once daily with breakfast., Disp: 30 tablet, Rfl: 2    semaglutide 0.5 mg/0.1 mL syringe, Inject 0.5 mg under the skin 1 (one) time per week. Tuesdays, Disp: , Rfl:         PAT ROS:   Constitutional:   neg    Neuro/Psych:   neg    Eyes:   neg    Ears:   neg    Nose:   neg    Mouth:   neg    Throat:   neg    Neck:   neg    Cardio:    FUNCTIONAL 4 METS. DENIES SOB WALKING FROM [facility] TO PARKING LOT  Respiratory:   neg    Endocrine:   neg    GI:   neg    :   neg    Musculoskeletal:   neg    Hematologic:   neg    Skin:  neg        Physical Exam  Vitals reviewed. Physical exam within normal limits.          PAT AIRWAY:   Airway:     Mallampati::  III    Neck ROM::  Full  normal        Lab Results   Component Value Date    GLUCOSE 88 07/31/2025    " "CALCIUM 8.7 07/31/2025     07/31/2025    K 4.1 07/31/2025    CO2 30 07/31/2025     07/31/2025    BUN 11 07/31/2025    CREATININE 0.85 07/31/2025      Lab Results   Component Value Date    WBC 5.0 07/31/2025    HGB 12.8 07/31/2025    HCT 40.1 07/31/2025    MCV 85 07/31/2025     07/31/2025     Pulse 69   Temp 36.4 °C (97.6 °F) (Temporal)   Resp 16   Ht (!) 1.549 m (5' 1\")   Wt 70 kg (154 lb 5.2 oz)   SpO2 99%   BMI 29.16 kg/m²         Assessment and Plan:           60 y.o.  female  scheduled for Right Breast Bracketed Lumpectomy with Magseed Localization - Right on 8/7/25 with Dr. Dupree .   Presents to CPM today for perioperative risk stratification and optimization             Cardiovascular:  Patient has no active cardiac symptoms.   Patient denies any chest pain, tightness, heaviness, pressure, radiating pain, palpitations, irregular heartbeats, lightheadedness, cough, congestion, shortness of breath, OWENS, PND, near syncope, weight loss or gain.    METS: 4  RCRI: 0 points, 3.9%  risk for postoperative MACE         Pulmonary:  No pulmonary diagnosis or significant findings on chart review or clinical presentation.  No further preoperative testing is indicated at this time.  age > 60, types of anesthetic  Stop Bang score is 2 placing patient at low risk for STEF  ARISCAT: <26 points, 1.6% risk of in-hospital postoperative pulmonary complication  PRODIGY: Low risk for opioid induced respiratory depression      Hematology:  Patient instructed to ambulate as soon as possible postoperatively to decrease thromboembolic risk.   Initiate mechanical DVT prophylaxis as soon as possible and initiate chemical prophylaxis when deemed safe from a bleeding standpoint post surgery.       Caprini: 4    Charge not submitted as CPM/PAT visit within 72 hours of scheduled surgery.      Risk assessment complete.  Patient is scheduled for a low surgical risk procedure.        Preoperative medication instructions " were provided and reviewed with the patient.  Any additional testing or evaluation was explained to the patient.  Nothing by mouth instructions were discussed and patient's questions were answered prior to conclusion to this encounter.  Patient verbalized understanding of preoperative instructions given in preadmission testing; discharge instructions available in EMR.    This note was dictated by a speech recognition.  Minor errors may have been detected in a speech recognition.

## 2025-08-04 NOTE — PREPROCEDURE INSTRUCTIONS
Medication List            Accurate as of August 4, 2025  8:14 AM. Always use your most recent med list.                ascorbic acid 250 mg tablet  Commonly known as: Vitamin C  Take 1 tablet (250 mg) by mouth once daily in the morning. Take before meals. Take with iron tablet daily  Additional Medication Adjustments for Surgery: Other (Comment)  Notes to patient: Stop now     cholecalciferol 50 mcg (2,000 units) tablet  Commonly known as: Vitamin D-3  Take 1 tablet (50 mcg) by mouth once daily.  Medication Adjustments for Surgery: Do Not take on the morning of surgery     ferrous sulfate 325 mg (65 mg elemental) tablet  Commonly known as: Iron (ferrous sulfate)  Take 1 tablet by mouth once daily with breakfast.  Medication Adjustments for Surgery: Do Not take on the morning of surgery     Intrarosa 6.5 mg insert  Generic drug: prasterone (DHEA)  INSERT 1 INSERT VAGINALLY EVERY NIGHT AT BEDTIME  Medication Adjustments for Surgery: Do Not take on the morning of surgery     minoxidil 2.5 mg tablet  Commonly known as: Loniten  Medication Adjustments for Surgery: Do Not take on the morning of surgery     semaglutide 0.5 mg/0.1 mL syringe  Medication Adjustments for Surgery: Do Not take on the morning of surgery                              NPO Instructions:    Preoperative Fasting Guidelines      Why must I stop eating and drinking before surgery?   With anesthesia, food or liquid in your stomach can enter your lungs causing serious complications   GLP-1 medications can slow the movement of food through your stomach and intestines.  This further increases the risk of food entering your lungs with anesthesia     When do I need to stop eating and drinking before my surgery?   To help ensure food has passed out of your stomach, START a clear liquid diet 24 hours before your surgery   On the day of your surgery/procedure, STOP all clear liquids 2 hours before your arrival time to the hospital/facility      A clear  liquid diet consists of clear liquids and foods that melt into a clear liquid (i.e. gelatin) and excludes solid foods and liquids you cannot see through (i.e. milk). Clears can and should contain sugar to obtain a sufficient number of calories.  A clear liquid diet includes   Clear, fat-free broth   Clear nutritional drinks   Pulp-free popsicles, vegetable and fruit juice   Gelatin   Coffee and tea without creamer or milk   Clear soda and sports drinks      Diabetic Patients   Clear liquids should not be sugar-free    Check your blood glucose levels as you normally do   If you have symptoms of low blood glucose (shakiness, sweating, dizziness, confusion) or high blood glucose (dry mouth, excessive thirst, frequent urination, blurry vision), check your blood glucose level   For low blood glucose increase your consumption of sugar-containing clear liquid    For high blood glucose, decrease your consumption of sugar-containing clears and treat as you normally would   If symptoms persist seek medical attention      Examples of GLP-1 Medications   Trulicity   Ozempic   Mounjaro   Zepbound   Malissa Daniel         CONTACT SURGEON'S OFFICE IF YOU DEVELOP:  * Fever = 100.4 F   * New respiratory symptoms (e.g. cough, shortness of breath, respiratory distress, sore throat)  * Recent loss of taste or smell  *Flu like symptoms such as headache, fatigue or gastrointestinal symptoms  * You develop any open sores, shingles, burning or painful urination   AND/OR:  * You no longer wish to have the surgery.  * Any other personal circumstances change that may lead to the need to cancel or defer this surgery.  *You were admitted to any hospital within one week of your planned procedure.    SMOKING:  *Quitting smoking can make a huge difference to your health and recovery from surgery.    *If you need help with quitting, call 800-QUIT-NOW.    SURGICAL TIME:  *You will be contacted between 2  p.m. and 6 p.m. the business day before your surgery with your arrival time.  *If you haven't received a call by 6pm, call [phone].  *Scheduled surgery times may change and you will be notified if this occurs-check your personal voicemail for any updates.    ON THE MORNING OF SURGERY:  *Wear comfortable, loose fitting clothing.   *Do not use moisturizers, creams, lotions or perfume.  *All jewelry and valuables should be left at home.  *Prosthetic devices such as contact lenses, hearing aids, dentures, eyelash extensions, hairpins and body piercing must be removed before surgery.    BRING WITH YOU:  *Photo ID and insurance card  *Current list of medications and allergies  *Pacemaker/Defibrillator/Heart stent cards  *CPAP machine and mask  *Slings/splints/crutches  *Copy of your complete Advanced Directive/DHPOA-if applicable  *Neurostimulator implant remote    PARKING AND ARRIVAL:  *Check in at the Main Entrance desk and let them know you are here for surgery.  *You will be directed to the 2nd floor surgical waiting area.    IF YOU ARE HAVING OUTPATIENT/SAME DAY SURGERY:  *A responsible adult MUST accompany you at the time of discharge and stay with you for 24 hours after your surgery.  *You may NOT drive yourself home after surgery.  *You may use a taxi or ride sharing service ([employer], [employer]) to return home ONLY if you are accompanied by a friend or family member.  *Instructions for resuming your medications will be provided by your surgeon.

## 2025-08-04 NOTE — CPM/PAT H&P
[facility] Evaluation       Name: Kasey Duarte (Kasey Duarte)  /Age: 1965/60 y.o.     In-Person           HPI        Date of Consult: 25    Referring Provider: Dr. Dupree    Surgery, Date, and Length: Right Breast Bracketed Lumpectomy with Magseed Localization - Right 25    Kasey Duarte is a 60 y.o. year-old female who presents to the [facility] for perioperative risk assessment prior to surgery.    Patient presents with a primary diagnosis of right breast cancer. She denies breast pain, breast lumps or nipple discharge. She denies any change in the skin of the breast or the contour of the breast.     She had a stereotactic core biopsy of a 1.9 cm area of calcifications in the right breast, upper inner aspect at anterior depth on 2025.   Pathology showed:   FINAL DIAGNOSIS   A. BREAST CALCIFICATIONS, RIGHT, UPPER INNER QUADRANT, CORE BIOPSY:      -- Ductal carcinoma in situ, intermediate to high nuclear grade, solid, cribriform, papillary and micropapillary patterns with comedonecrosis and microcalcifications       This note was created in part upon personal review of patient's medical records.      Patient is scheduled to have Right Breast Bracketed Lumpectomy with Magseed Localization - Right 25      Pt denies any past history of anesthetic complications such as PONV, awareness, prolonged sedation, dental damage, aspiration, cardiac arrest, difficult intubation, difficult I.V. access or unexpected hospital admissions.  NO malignant hyperthermia and or pseudocholinesterase deficiency.   history of blood transfusions     PONV-2    The patient is not a [employer] and will accept blood and blood products if medically indicated.   Type and screen not sent.     Past Medical History:   Diagnosis Date    Anemia     Arthropathy, unspecified 2018    Arthropathy    Breast cancer     right    Erosion of implanted vaginal mesh and prosthetic materials     Esophageal dysphagia     Hiatal hernia      History of blood transfusion     total of 4 units with hysterectomy    Hypoactive sexual desire disorder 2020    Hypoactive sexual desire disorder    Iron deficiency     Obesity     PONV (postoperative nausea and vomiting)     with hysterectomy    Postmenopausal atrophic vaginitis 2020    Vaginal atrophy    Status post abdominal supracervical subtotal hysterectomy     Status post bariatric surgery        Past Surgical History:   Procedure Laterality Date    BARIATRIC SURGERY      s/p sleeve, at [facility]     SECTION, CLASSIC  2014     Section    ESOPHAGOSCOPY / EGD  2025    5 cm HH, Random BiopsyErythematous Mucosa Antrum @ [facility] w/Dr. JEANNIE Blount    HYSTERECTOMY  2014    Open supracervical hyst w/ Dr. Tanner , discharged then readmitted with sepsis for approx 5 days    OTHER SURGICAL HISTORY Bilateral 2014    Arthrotomy Of Knee    OTHER SURGICAL HISTORY  2014    Laparoscopic Sling Operation For Stress Incontinence    OTHER SURGICAL HISTORY  2014    Foot Surgery Left    OTHER SURGICAL HISTORY  2014    Ovarian Cystectomy Right    TONSILLECTOMY  2014    Tonsillectomy    UMBILICAL HERNIA REPAIR  2014    with mesh       Social History     Tobacco Use    Smoking status: Never     Passive exposure: Never    Smokeless tobacco: Never   Vaping Use    Vaping status: Never Used   Substance Use Topics    Alcohol use: Yes     Alcohol/week: 4.0 standard drinks of alcohol     Types: 4 Glasses of wine per week     Comment: 4 glasses of wine a week per pt    Drug use: Never      Family History   Problem Relation Name Age of Onset    Coronary artery disease Mother      Diabetes Mother      Hypertension Mother          essential    Kidney disease Mother      Other (myocardial infarction) Mother      Diabetes type II Mother      Prostate cancer Father      Coronary artery disease Father      Hypertension Father          essential    Stroke Father       "Hypertension Sister          essential    Throat cancer Sister      Hypertension Brother Isai 20 - 29    Hypertension Daughter      Other (malignant neoplasm of tonsil) Daughter      Diabetes Maternal Grandmother      Other (malignant neoplasm of stomach) Maternal Grandmother      Lymphoma Paternal Grandfather         Allergies   Allergen Reactions    Nsaids (Non-Steroidal Anti-Inflammatory Drug) Other     Cannot take due to bariatric surgery    Penicillins Anaphylaxis    Propoxyphene N-Acetaminophen Other     Violent migraine per pt    \"Darvocet\"       Current Outpatient Medications:     ascorbic acid (Vitamin C) 250 mg tablet, Take 1 tablet (250 mg) by mouth once daily in the morning. Take before meals. Take with iron tablet daily, Disp: 30 tablet, Rfl: 2    Intrarosa 6.5 mg insert, INSERT 1 INSERT VAGINALLY EVERY NIGHT AT BEDTIME, Disp: 28 each, Rfl: 3    minoxidil (Loniten) 2.5 mg tablet, Take 1 tablet (2.5 mg) by mouth once daily., Disp: , Rfl:     cholecalciferol (Vitamin D-3) 50 mcg (2,000 units) tablet, Take 1 tablet (50 mcg) by mouth once daily., Disp: 30 tablet, Rfl: 2    ferrous sulfate (Iron, ferrous sulfate,) 325 mg (65 mg elemental) tablet, Take 1 tablet by mouth once daily with breakfast., Disp: 30 tablet, Rfl: 2    semaglutide 0.5 mg/0.1 mL syringe, Inject 0.5 mg under the skin 1 (one) time per week. Tuesdays, Disp: , Rfl:         PAT ROS:   Constitutional:   neg    Neuro/Psych:   neg    Eyes:   neg    Ears:   neg    Nose:   neg    Mouth:   neg    Throat:   neg    Neck:   neg    Cardio:    FUNCTIONAL 4 METS. DENIES SOB WALKING FROM [facility] TO PARKING LOT  Respiratory:   neg    Endocrine:   neg    GI:   neg    :   neg    Musculoskeletal:   neg    Hematologic:   neg    Skin:  neg        Physical Exam  Vitals reviewed. Physical exam within normal limits.          PAT AIRWAY:   Airway:     Mallampati::  III    Neck ROM::  Full  normal        Lab Results   Component Value Date    GLUCOSE 88 07/31/2025    " "CALCIUM 8.7 07/31/2025     07/31/2025    K 4.1 07/31/2025    CO2 30 07/31/2025     07/31/2025    BUN 11 07/31/2025    CREATININE 0.85 07/31/2025      Lab Results   Component Value Date    WBC 5.0 07/31/2025    HGB 12.8 07/31/2025    HCT 40.1 07/31/2025    MCV 85 07/31/2025     07/31/2025     Pulse 69   Temp 36.4 °C (97.6 °F) (Temporal)   Resp 16   Ht (!) 1.549 m (5' 1\")   Wt 70 kg (154 lb 5.2 oz)   SpO2 99%   BMI 29.16 kg/m²         Assessment and Plan:           60 y.o.  female  scheduled for Right Breast Bracketed Lumpectomy with Magseed Localization - Right on 8/7/25 with Dr. Dupree .   Presents to CPM today for perioperative risk stratification and optimization             Cardiovascular:  Patient has no active cardiac symptoms.   Patient denies any chest pain, tightness, heaviness, pressure, radiating pain, palpitations, irregular heartbeats, lightheadedness, cough, congestion, shortness of breath, OWENS, PND, near syncope, weight loss or gain.    METS: 4  RCRI: 0 points, 3.9%  risk for postoperative MACE         Pulmonary:  No pulmonary diagnosis or significant findings on chart review or clinical presentation.  No further preoperative testing is indicated at this time.  age > 60, types of anesthetic  Stop Bang score is 2 placing patient at low risk for STEF  ARISCAT: <26 points, 1.6% risk of in-hospital postoperative pulmonary complication  PRODIGY: Low risk for opioid induced respiratory depression      Hematology:  Patient instructed to ambulate as soon as possible postoperatively to decrease thromboembolic risk.   Initiate mechanical DVT prophylaxis as soon as possible and initiate chemical prophylaxis when deemed safe from a bleeding standpoint post surgery.       Caprini: 4    Charge not submitted as CPM/PAT visit within 72 hours of scheduled surgery.      Risk assessment complete.  Patient is scheduled for a low surgical risk procedure.        Preoperative medication instructions " were provided and reviewed with the patient.  Any additional testing or evaluation was explained to the patient.  Nothing by mouth instructions were discussed and patient's questions were answered prior to conclusion to this encounter.  Patient verbalized understanding of preoperative instructions given in preadmission testing; discharge instructions available in EMR.    This note was dictated by a speech recognition.  Minor errors may have been detected in a speech recognition.

## 2025-08-06 ENCOUNTER — ANESTHESIA EVENT (OUTPATIENT)
Dept: OPERATING ROOM | Facility: HOSPITAL | Age: 60
End: 2025-08-06
Payer: COMMERCIAL

## 2025-08-06 PROBLEM — Z92.89 HISTORY OF BLOOD TRANSFUSION: Status: ACTIVE | Noted: 2025-08-06

## 2025-08-06 PROBLEM — C50.919 BREAST CANCER: Status: ACTIVE | Noted: 2025-08-06

## 2025-08-06 PROBLEM — R13.10 DYSPHAGIA: Status: ACTIVE | Noted: 2025-08-06

## 2025-08-06 PROBLEM — Z98.890 PONV (POSTOPERATIVE NAUSEA AND VOMITING): Status: ACTIVE | Noted: 2025-08-06

## 2025-08-06 PROBLEM — D64.9 ANEMIA: Status: ACTIVE | Noted: 2025-08-06

## 2025-08-06 PROBLEM — R11.2 PONV (POSTOPERATIVE NAUSEA AND VOMITING): Status: ACTIVE | Noted: 2025-08-06

## 2025-08-06 PROBLEM — E66.9 OBESITY: Status: ACTIVE | Noted: 2025-08-06

## 2025-08-06 LAB
ANABASINE UR-MCNC: <5 NG/ML
COTININE UR-MCNC: <15 NG/ML
NICOTINE UR-MCNC: <15 NG/ML
TRANS-3-OH-COTININE UR-MCNC: <50 NG/ML

## 2025-08-06 NOTE — ANESTHESIA PREPROCEDURE EVALUATION
Patient: Kasey Duarte    Procedure Information       Date/Time: 08/07/25 0705    Procedure: Right Breast Bracketed Lumpectomy with Magseed Localization (Right: Breast)    Location: [facility] OR 04 / [facility] OR    Surgeons: Michelle Dupree MD            Relevant Problems   Anesthesia   (+) PONV (postoperative nausea and vomiting)      Pulmonary   (+) STEF (obstructive sleep apnea)      GI   (+) Dysphagia   (+) Hiatal hernia   (+) Paraesophageal hernia with gastroesophageal reflux      /Renal   (+) Pyelonephritis      Endocrine   (+) Obesity      Hematology   (+) Anemia   (+) History of blood transfusion      ID   (+) Pyelonephritis      GYN   (+) Breast cancer                                                         Pre-Anesthesia Evaluation                                         Kasey Duarte is a 60 y.o. female who presents for the above mentioned procedure due to Ductal carcinoma in situ (DCIS) of right breast [D05.11]    Medical History[1]  Surgical History[2]  Social History[3]  RX Allergies[4]  Current Medications[5]  Prior to Admission medications   Medication Sig Start Date End Date Taking? Authorizing Provider   ascorbic acid (Vitamin C) 250 mg tablet Take 1 tablet (250 mg) by mouth once daily in the morning. Take before meals. Take with iron tablet daily 6/4/25 9/2/25  Warren Lorenzo MD   cholecalciferol (Vitamin D-3) 50 mcg (2,000 units) tablet Take 1 tablet (50 mcg) by mouth once daily. 6/4/25 9/2/25  Warren Lorenzo MD   ferrous sulfate (Iron, ferrous sulfate,) 325 mg (65 mg elemental) tablet Take 1 tablet by mouth once daily with breakfast. 6/4/25 9/2/25  Warren Lorenzo MD   Intrarosa 6.5 mg insert INSERT 1 INSERT VAGINALLY EVERY NIGHT AT BEDTIME 1/6/25   Megan Tucker MD MPH   minoxidil (Loniten) 2.5 mg tablet Take 1 tablet (2.5 mg) by mouth once daily.    Historical Provider, MD   semaglutide 0.5 mg/0.1 mL syringe Inject 0.5 mg under the skin 1 (one) time per week. Tuesdays    Historical Provider, MD  "  chlorhexidine (Hibiclens) 4 % external liquid Apply 1 Application topically once daily for 5 days. Use per CPM/PAT provided instructions 7/31/25 7/31/25  QUINTON Marie   chlorhexidine (Peridex) 0.12 % solution Use 15 mL in the mouth or throat once daily for 2 doses. 7/31/25 7/31/25  QUINTON Marie   fluocinolone (DermOtic Oil) 0.01 % ear drops Administer 4 drops to each ear twice daily for the next 5 days and then as needed for itching  Patient not taking: Reported on 8/4/2025 7/29/25 8/4/25  QUINTON Puga     No medication comments found.    Visit Vitals  OB Status Hysterectomy   Smoking Status Never                         BP Readings from Last 4 Encounters:   08/04/25 113/66   07/31/25 106/71   07/10/25 95/65   06/20/25 102/57      If applicable:  No results found for: \"PREGTESTUR\", \"PREGSERUM\", \"HCG\", \"HCGQUANT\"        Recent Labs     07/31/25  0831 05/30/25  0856   WBC 5.0 6.0   HGB 12.8 13.1   HCT 40.1 41.2    289   MCV 85 83.6     Recent Labs     07/31/25  0831 01/27/25  0950 09/29/22  1035   EGFR 78 77  --    ANIONGAP 8*  --  13   BUN 11 16 12   CREATININE 0.85 0.86 0.96    141 141   K 4.1 4.3 4.0    107 103   CO2 30 27 29   GLUCOSE 88 96 91   CALCIUM 8.7 9.0 9.2     Recent Labs     05/30/25  0856 01/27/25  0950 09/29/22  1035   TSH  --  0.56 0.93   PTH 49  --   --      Recent Labs     07/31/25  0831 01/27/25  0950   BILITOT 0.4 0.5   PROT 6.3* 6.6   ALBUMIN 4.0 4.2   ALKPHOS 40 40   ALT 11 12   AST 14 14     Recent Labs     07/31/25  0831   PROTIME 11.8   INR 1.1     Lab Results   Component Value Date    ABO B 07/31/2025    LABRH POS 07/31/2025    ABSCRN NEG 07/31/2025     Lab Results   Component Value Date    FERRITIN 75 05/30/2025    TIBC 293 05/30/2025    IRONSAT 7 (L) 05/30/2025     No results found for: \"STAPHMRSASCR\"  No results for input(s): \"AMPHETAMINE\", \"MAMPHBLDS\", \"BARBITURATE\", \"BENZODIAZ\", \"BUPRENBLDS\", \"CANNABBLDS\", \"COCBLDS\", " "\"METHABLDS\", \"OXYBLDS\", \"PCPBLDS\", \"OPIATBLDS\", \"DRBLDCOMM\" in the last 27621 hours.  No results for input(s): \"PHART\", \"DRZ6WXW\", \"PO2ART\", \"DXG1NPC\", \"F1CYPWFV\", \"BEART\", \"Y5QIMEFM\" in the last 47654 hours.    EKG No results found for this or any previous visit (from the past 4464 hours).   Ejection Fractions:No results found for: \"EF\"  Echocardiogram     Stress TestingNo results found for this or any previous visit from the past 02732 days.   No results found for this or any previous visit from the past 365 days.   No results found for this or any previous visit from the past 365 days.     Cardiac Catheterization  No results found for this or any previous visit from the past 365 days.   No results found for this or any previous visit from the past 365 days.   No results found for this or any previous visit from the past 365 days.     Right Heart Cath No results found for this or any previous visit from the past 365 days.    Cardiac Scoring The patient's coronary artery calcium score on No results found for this or any previous visit from the past 365 days.     AAA screen No results found for this or any previous visit from the past 365 days.     Carotid DopplerNo results found for this or any previous visit from the past 50010 days.      X Ray === 07/31/25 ===    XR CHEST 1 VIEW    - Impression -  No acute osseous abnormality    MACRO:  None.    Signed by: Aaliyah Rashid 8/1/2025 8:04 AM  Dictation workstation:   [id number]  Pulmonary Function Tests   No results found for this or any previous visit from the past 365 days.    No results found for: \"FEV1\", \"FVC\", \"XOC6TCA\", \"TLC\", \"DLCO\"   OTHER: No results found for this or any previous visit from the past 1825 days.    The 10-year ASCVD risk score (Luis Manuel BAUMANN, et al., 2019) is: 2.7%    Values used to calculate the score:      Age: 60 years      Clincally relevant sex: Female      Is Non- : No      Diabetic: No      Tobacco smoker: No     "  Systolic Blood Pressure: 113 mmHg      Is BP treated: No      HDL Cholesterol: 57 mg/dL      Total Cholesterol: 188 mg/dL    Code Status: Full Code                   Clinical information reviewed:                   NPO Detail:  No data recorded     Physical Exam    Airway  Mallampati: II  TM distance: >3 FB  Mouth openin finger widths     Cardiovascular   Rhythm: regular  Rate: normal     Dental        Pulmonary Breath sounds clear to auscultation     Abdominal            Anesthesia Plan    History of general anesthesia?: yes  History of complications of general anesthesia?: no    ASA 2     general     intravenous induction   Anesthetic plan and risks discussed with patient.    Plan discussed with CRNA.    H/o PONV after hysterectomy - Emend, scop patch prophylaxis         [1]   Past Medical History:  Diagnosis Date    Anemia     Arthropathy, unspecified 2018    Arthropathy    Breast cancer     right    Erosion of implanted vaginal mesh and prosthetic materials     Esophageal dysphagia     Hiatal hernia     History of blood transfusion     total of 4 units with hysterectomy    Hypoactive sexual desire disorder 2020    Hypoactive sexual desire disorder    Iron deficiency     Obesity     PONV (postoperative nausea and vomiting)     with hysterectomy    Postmenopausal atrophic vaginitis 2020    Vaginal atrophy    Status post abdominal supracervical subtotal hysterectomy     Status post bariatric surgery    [2]   Past Surgical History:  Procedure Laterality Date    BARIATRIC SURGERY      s/p sleeve, at [facility] [place]     SECTION, CLASSIC  2014     Section    ESOPHAGOSCOPY / EGD  2025    5 cm HH, Random BiopsyErythematous Mucosa Antrum @ [facility] [place] w/Dr. JEANNIE Blount    HYSTERECTOMY  2014    Open supracervical hyst w/ Dr. Tanner , discharged then readmitted with sepsis for approx 5 days    OTHER SURGICAL HISTORY Bilateral 2014    Arthrotomy Of Knee    OTHER SURGICAL HISTORY   "06/17/2014    Laparoscopic Sling Operation For Stress Incontinence    OTHER SURGICAL HISTORY  06/17/2014    Foot Surgery Left    OTHER SURGICAL HISTORY  06/17/2014    Ovarian Cystectomy Right    TONSILLECTOMY  06/17/2014    Tonsillectomy    UMBILICAL HERNIA REPAIR  06/17/2014    with mesh   [3]   Social History  Tobacco Use    Smoking status: Never     Passive exposure: Never    Smokeless tobacco: Never   Vaping Use    Vaping status: Never Used   Substance Use Topics    Alcohol use: Yes     Alcohol/week: 4.0 standard drinks of alcohol     Types: 4 Glasses of wine per week     Comment: 4 glasses of wine a week per pt    Drug use: Never   [4]   Allergies  Allergen Reactions    Nsaids (Non-Steroidal Anti-Inflammatory Drug) Other     Cannot take due to bariatric surgery    Penicillins Anaphylaxis    Propoxyphene N-Acetaminophen Other     Violent migraine per pt    \"Darvocet\"   [5] No current facility-administered medications for this encounter.    Current Outpatient Medications:     ascorbic acid (Vitamin C) 250 mg tablet, Take 1 tablet (250 mg) by mouth once daily in the morning. Take before meals. Take with iron tablet daily, Disp: 30 tablet, Rfl: 2    cholecalciferol (Vitamin D-3) 50 mcg (2,000 units) tablet, Take 1 tablet (50 mcg) by mouth once daily., Disp: 30 tablet, Rfl: 2    ferrous sulfate (Iron, ferrous sulfate,) 325 mg (65 mg elemental) tablet, Take 1 tablet by mouth once daily with breakfast., Disp: 30 tablet, Rfl: 2    Intrarosa 6.5 mg insert, INSERT 1 INSERT VAGINALLY EVERY NIGHT AT BEDTIME, Disp: 28 each, Rfl: 3    minoxidil (Loniten) 2.5 mg tablet, Take 1 tablet (2.5 mg) by mouth once daily., Disp: , Rfl:     semaglutide 0.5 mg/0.1 mL syringe, Inject 0.5 mg under the skin 1 (one) time per week. Tuesdays, Disp: , Rfl:     "

## 2025-08-07 ENCOUNTER — HOSPITAL ENCOUNTER (OUTPATIENT)
Facility: HOSPITAL | Age: 60
Setting detail: OUTPATIENT SURGERY
Discharge: HOME | End: 2025-08-07
Attending: SURGERY | Admitting: SURGERY
Payer: COMMERCIAL

## 2025-08-07 ENCOUNTER — APPOINTMENT (OUTPATIENT)
Dept: RADIOLOGY | Facility: CLINIC | Age: 60
End: 2025-08-07
Payer: COMMERCIAL

## 2025-08-07 ENCOUNTER — PHARMACY VISIT (OUTPATIENT)
Dept: PHARMACY | Facility: CLINIC | Age: 60
End: 2025-08-07
Payer: MEDICARE

## 2025-08-07 ENCOUNTER — ANESTHESIA (OUTPATIENT)
Dept: OPERATING ROOM | Facility: HOSPITAL | Age: 60
End: 2025-08-07
Payer: COMMERCIAL

## 2025-08-07 PROCEDURE — RXMED WILLOW AMBULATORY MEDICATION CHARGE

## 2025-08-07 PROCEDURE — 76098 X-RAY EXAM SURGICAL SPECIMEN: CPT | Mod: RT

## 2025-08-07 PROCEDURE — 2500000004 HC RX 250 GENERAL PHARMACY W/ HCPCS (ALT 636 FOR OP/ED)

## 2025-08-07 RX ORDER — FENTANYL CITRATE 50 UG/ML
INJECTION, SOLUTION INTRAMUSCULAR; INTRAVENOUS AS NEEDED
Status: DISCONTINUED | OUTPATIENT
Start: 2025-08-07 | End: 2025-08-07

## 2025-08-07 RX ORDER — SODIUM CHLORIDE, SODIUM LACTATE, POTASSIUM CHLORIDE, CALCIUM CHLORIDE 600; 310; 30; 20 MG/100ML; MG/100ML; MG/100ML; MG/100ML
INJECTION, SOLUTION INTRAVENOUS CONTINUOUS PRN
Status: DISCONTINUED | OUTPATIENT
Start: 2025-08-07 | End: 2025-08-07

## 2025-08-07 RX ORDER — LIDOCAINE HYDROCHLORIDE 20 MG/ML
INJECTION, SOLUTION EPIDURAL; INFILTRATION; INTRACAUDAL; PERINEURAL AS NEEDED
Status: DISCONTINUED | OUTPATIENT
Start: 2025-08-07 | End: 2025-08-07

## 2025-08-07 RX ORDER — PHENYLEPHRINE HCL IN 0.9% NACL 1 MG/10 ML
SYRINGE (ML) INTRAVENOUS AS NEEDED
Status: DISCONTINUED | OUTPATIENT
Start: 2025-08-07 | End: 2025-08-07

## 2025-08-07 RX ORDER — ONDANSETRON HYDROCHLORIDE 2 MG/ML
INJECTION, SOLUTION INTRAVENOUS AS NEEDED
Status: DISCONTINUED | OUTPATIENT
Start: 2025-08-07 | End: 2025-08-07

## 2025-08-07 RX ORDER — PROPOFOL 10 MG/ML
INJECTION, EMULSION INTRAVENOUS AS NEEDED
Status: DISCONTINUED | OUTPATIENT
Start: 2025-08-07 | End: 2025-08-07

## 2025-08-07 RX ORDER — MIDAZOLAM HYDROCHLORIDE 2 MG/2ML
INJECTION, SOLUTION INTRAMUSCULAR; INTRAVENOUS AS NEEDED
Status: DISCONTINUED | OUTPATIENT
Start: 2025-08-07 | End: 2025-08-07

## 2025-08-07 RX ADMIN — PROPOFOL 150 MG: 10 INJECTION, EMULSION INTRAVENOUS at 07:35

## 2025-08-07 RX ADMIN — Medication 50 MCG: at 08:20

## 2025-08-07 RX ADMIN — DEXAMETHASONE SODIUM PHOSPHATE 4 MG: 4 INJECTION, SOLUTION INTRAMUSCULAR; INTRAVENOUS at 07:46

## 2025-08-07 RX ADMIN — LIDOCAINE HYDROCHLORIDE 60 MG: 20 INJECTION, SOLUTION EPIDURAL; INFILTRATION; INTRACAUDAL; PERINEURAL at 07:35

## 2025-08-07 RX ADMIN — ONDANSETRON 4 MG: 2 INJECTION, SOLUTION INTRAMUSCULAR; INTRAVENOUS at 08:51

## 2025-08-07 RX ADMIN — FENTANYL CITRATE 50 MCG: 50 INJECTION, SOLUTION INTRAMUSCULAR; INTRAVENOUS at 08:24

## 2025-08-07 RX ADMIN — SODIUM CHLORIDE, SODIUM LACTATE, POTASSIUM CHLORIDE, AND CALCIUM CHLORIDE: .6; .31; .03; .02 INJECTION, SOLUTION INTRAVENOUS at 07:30

## 2025-08-07 RX ADMIN — MIDAZOLAM HYDROCHLORIDE 2 MG: 1 INJECTION, SOLUTION INTRAMUSCULAR; INTRAVENOUS at 07:29

## 2025-08-07 RX ADMIN — PROPOFOL 50 MG: 10 INJECTION, EMULSION INTRAVENOUS at 08:11

## 2025-08-07 RX ADMIN — FENTANYL CITRATE 50 MCG: 50 INJECTION, SOLUTION INTRAMUSCULAR; INTRAVENOUS at 07:35

## 2025-08-07 ASSESSMENT — PAIN DESCRIPTION - ORIENTATION
ORIENTATION: RIGHT

## 2025-08-07 ASSESSMENT — PAIN SCALES - GENERAL
PAINLEVEL_OUTOF10: 7
PAINLEVEL_OUTOF10: 5 - MODERATE PAIN
PAINLEVEL_OUTOF10: 8
PAINLEVEL_OUTOF10: 6
PAINLEVEL_OUTOF10: 6
PAINLEVEL_OUTOF10: 9
PAINLEVEL_OUTOF10: 6
PAINLEVEL_OUTOF10: 5 - MODERATE PAIN
PAINLEVEL_OUTOF10: 7
PAINLEVEL_OUTOF10: 5 - MODERATE PAIN
PAINLEVEL_OUTOF10: 0 - NO PAIN
PAINLEVEL_OUTOF10: 10 - WORST POSSIBLE PAIN

## 2025-08-07 ASSESSMENT — PAIN - FUNCTIONAL ASSESSMENT
PAIN_FUNCTIONAL_ASSESSMENT: 0-10
PAIN_FUNCTIONAL_ASSESSMENT: UNABLE TO SELF-REPORT
PAIN_FUNCTIONAL_ASSESSMENT: 0-10

## 2025-08-07 ASSESSMENT — PAIN DESCRIPTION - LOCATION
LOCATION: BREAST

## 2025-08-07 ASSESSMENT — COLUMBIA-SUICIDE SEVERITY RATING SCALE - C-SSRS
6. HAVE YOU EVER DONE ANYTHING, STARTED TO DO ANYTHING, OR PREPARED TO DO ANYTHING TO END YOUR LIFE?: NO
2. HAVE YOU ACTUALLY HAD ANY THOUGHTS OF KILLING YOURSELF?: NO
1. IN THE PAST MONTH, HAVE YOU WISHED YOU WERE DEAD OR WISHED YOU COULD GO TO SLEEP AND NOT WAKE UP?: NO

## 2025-08-07 NOTE — ANESTHESIA POSTPROCEDURE EVALUATION
Patient: Kasey Duarte    Procedure Summary       Date: 08/07/25 Room / Location: [facility] A OR 04 / Virtual [facility] A OR    Anesthesia Start: 0730 Anesthesia Stop: 0904    Procedure: Right Breast Bracketed Lumpectomy with Magseed Localization (Right: Breast) Diagnosis:       Ductal carcinoma in situ (DCIS) of right breast      (Ductal carcinoma in situ (DCIS) of right breast [D05.11])    Surgeons: Michelle Dupree MD Responsible Provider: Reynaldo Frazier MD    Anesthesia Type: general ASA Status: 2            Anesthesia Type: general    Vitals Value Taken Time   /64 08/07/25 09:16   Temp 36.3 °C (97.3 °F) 08/07/25 09:00   Pulse 63 08/07/25 09:16   Resp 15 08/07/25 09:00   SpO2 100 % 08/07/25 09:16   Vitals shown include unfiled device data.    Anesthesia Post Evaluation    Patient participation: complete - patient participated  Level of consciousness: awake and alert  Pain management: adequate  Airway patency: patent  Cardiovascular status: acceptable  Respiratory status: acceptable  Hydration status: acceptable  Postoperative Nausea and Vomiting: none        No notable events documented.

## 2025-08-07 NOTE — ANESTHESIA PROCEDURE NOTES
Airway  Date/Time: 8/7/2025 7:39 AM  Reason: elective    Airway not difficult    Staffing  Performed: JAYLON   Authorized by: Reynaldo Frazier MD    Performed by: JAYLON Layne  Patient location during procedure: OR    Patient Condition  Indications for airway management: anesthesia  Patient position: sniffing  Planned trial extubation  Sedation level: minimal     Final Airway Details   Preoxygenated: yes  Final airway type: supraglottic airway  Successful airway:   Size: 4  Number of attempts at approach: 1    Additional Comments  Igel 4

## 2025-08-07 NOTE — OP NOTE
Right Breast Bracketed Lumpectomy with Magseed Localization (R) Operative Note     Date: 2025  OR Location: [facility] A OR    Name: Kasey Duarte, : 1965, Age: 60 y.o., MRN: [id number], Sex: female    Diagnosis  Pre-op Diagnosis      * Ductal carcinoma in situ (DCIS) of right breast [D05.11] Post-op Diagnosis     * Ductal carcinoma in situ (DCIS) of right breast [D05.11]     Procedures  Right Breast Bracketed Lumpectomy with Magseed Localization  76044 - [place] MASTECTOMY PARTIAL      Surgeons      * Michelle Dupree - Primary    Resident/Fellow/Other Assistant:  SA KATIE Hoyt, M3    Staff:   Circulator: Shona  Scrclaire Person: Vicky  Surgical Assistant: Ferdinand    Anesthesia Staff: Anesthesiologist: Reynaldo Frazier MD  C-AA: JAYLON Layne    Procedure Summary  Anesthesia: General  ASA: II  Estimated Blood Loss: 5mL  Intra-op Medications:   Administrations occurring from 705 to 0855 on 25:   Medication Name Total Dose   sodium chloride 0.9 % irrigation solution 1,000 mL   bupivacaine PF 0.25 % (Marcaine) 0.25 % (2.5 mg/mL) 30 mL, lidocaine-epinephrine (Xylocaine W/EPI) 1 %-1:100,000 30 mL syringe 10 mL   dexAMETHasone (Decadron) 4 mg/mL IV Syringe 2 mL 4 mg   fentaNYL (Sublimaze) injection 50 mcg/mL 100 mcg   lactated Ringer's infusion Cannot be calculated   lidocaine PF (Xylocaine-MPF) local injection 2 % 60 mg   midazolam PF (Versed) injection 1 mg/mL 2 mg   ondansetron (Zofran) 2 mg/mL injection 4 mg   phenylephrine 100 mcg/mL syringe 10 mL (prefilled) 50 mcg   propofol (Diprivan) injection 10 mg/mL 200 mg              Anesthesia Record               Intraprocedure I/O Totals       None           Specimen:   ID Type Source Tests Collected by Time   1 : Right Breast Lumpectomy upper inner quadrant stitches beth short superior long lateral Tissue BREAST LUMPECTOMY RIGHT SURGICAL PATHOLOGY EXAM Shona ALFARO RN 2025 0828   2 : Lateral margin - new lateral margin marked with clip and painted  ORANGE Tissue BREAST MARGIN RIGHT SURGICAL PATHOLOGY EXAM Shona ALFARO RN 8/7/2025 0828   3 : Anterior margin - new anterior margin marked with clip and painted GREEN Tissue BREAST MARGIN RIGHT SURGICAL PATHOLOGY EXAM Shona Collins V, RN 8/7/2025 0828   4 : Posterior margin - new posterior margin marked with clip and painted BLACK Tissue BREAST MARGIN RIGHT SURGICAL PATHOLOGY EXAM Shona Collins V, RN 8/7/2025 0829   5 : Medial margin - new medial margin marked with clip and painted YELLOW Tissue BREAST MARGIN RIGHT SURGICAL PATHOLOGY EXAM Shona Collins V, KEELY 8/7/2025 0829   6 : Inferior margin - new inferior margin marked with clip and painted BLUE Tissue BREAST MARGIN RIGHT SURGICAL PATHOLOGY EXAM Shona Collins V, KEELY 8/7/2025 0829   7 : Superior margin - new superior margin marked with clip and painted RED Tissue BREAST MARGIN RIGHT SURGICAL PATHOLOGY EXAM Shona ALFARO RN 8/7/2025 0829        Indications: Kasey Duarte is an 60 y.o. female who is having surgery for Ductal carcinoma in situ (DCIS) of right breast [D05.11].     The patient was seen in the preoperative area. The risks, benefits, complications, treatment options, non-operative alternatives, expected recovery and outcomes were discussed with the patient. The possibilities of reaction to medication, pulmonary aspiration, injury to surrounding structures, bleeding, recurrent infection, the need for additional procedures, failure to diagnose a condition, and creating a complication requiring transfusion or operation were discussed with the patient. The patient concurred with the proposed plan, giving informed consent.  The site of surgery was properly noted/marked if necessary per policy. The patient has been actively warmed in preoperative area. Preoperative antibiotics are not indicated. Venous thrombosis prophylaxis have been ordered including bilateral sequential compression devices    Procedure Details:   Operative indications: The patient had a  mammogram showing several areas of calcifications in the right breast. A core biopsy of 2 areas showed DCIS. Surgical options were discussed with the patient and she chose to proceed with a right lumpectomy with magseed localization.  This would be a bracketed lumpectomy with 3 Magseed's placed.  The risks, benefits and procedure were discussed with the patient including bleeding, infection, scar tissue formation, deformity of the breast and anesthesia. We also discussed the possibility of finding an invasive cancer and the need for additional surgery to test lymph nodes or to clear margins. She understood all risks and agreed to proceed.     Operative report: The patient was taken to the operating room and placed on the table in the supine position. A timeout was performed. The site was marked preoperatively. She received general anesthesia without complication. The sentimag probe was used to identify the Magseeds in the breast. The patient was prepped and draped in the usual sterile fashion. Local anesthesia was obtained and then an incision was made in the upper inner aspect of the right breast. Dissection continued with Metzenbaum scissors and Bovie electrocautery. The sentimag probe again was used to identify the most inferior Magseed.  Dissection continued and the inferior lateral Magseed was then identified.  Dissection continued superiorly and medial to the area of the most medial and posterior Magseed.  Once all 3 Magseed's were identified, the area of tissue surrounding all of the Magseeds was grasped with a 2 Allis clamps. The tissue which included all 3 Magseeds was removed using Metzenbaum scissors.  One of the HydroMARK clips which was the most inferior one had been dislodged from the tissue.  It was retrieved.  The specimen was labeled with a short stitch superiorly and a long stitch laterally. The sentimag probe was used to confirm that all 3 of the Magseeds were present in the tissue specimen. The  cavity was swept with the sentimag probe and there was no activity noted. The specimen was labeled as right lumpectomy breast tissue upper inner quadrant. A specimen radiograph was done using the [employer] radiography system which confirmed that all 3 Magseeds, both tissue marker clips and additional calcifications were within the tissue specimen. The specimen was then sent to pathology. Shave margins were then taken from the lumpectomy cavity.  All tissue was excised in a similar fashion with an area of tissue grasped with an Allis clamp and dissection done with Metzenbaum scissors to excise the superior, lateral, inferior, medial, anterior and posterior margins.  Each margin was identified as a shave margin with the new true margin marked with a clip and painted with the appropriate color ink.  Each specimen was sent separately to pathology. The posterior aspect of dissection was at pectoralis muscle and pectoralis fascial was taken. Hemostasis was achieved with Bovie electrocautery. After adequate hemostasis, the wound was irrigated and the irrigation fluid was suctioned out.  Clips were used to beth the lumpectomy cavity.  A deep layer was reapproximated with interrupted 3-0 Vicryl stitches.  A superficial, subcutaneous layer was closed with interrupted 3-0 Vicryl stitches. The skin was closed with a running subcuticular 4-0 Biosyn stitch. Steri-Strips were placed followed by fluffs and a Surgi-Bra. She tolerated the procedure well and was transferred to PACU in stable condition.   Evidence of Infection: No   Complications:  None; patient tolerated the procedure well.    Disposition: PACU - hemodynamically stable.  Condition: stable         Task Performed by RNFA or Surgical Assistant:  retracting tissue, helping with visualization for proper surgical exposure of tissues and assistance with hemostasis and closure.      Additional Details: I personally reviewed the specimen radiograph  intra-operatively.      Attending Attestation: A qualified resident physician was not available.    Michelle Dupree  Phone Number: [phone]

## 2025-08-08 ASSESSMENT — PAIN SCALES - GENERAL: PAINLEVEL_OUTOF10: 2

## 2025-08-20 PROBLEM — K44.9 PARAESOPHAGEAL HERNIA: Status: ACTIVE | Noted: 2025-08-20

## 2025-08-21 ENCOUNTER — ANESTHESIA (OUTPATIENT)
Dept: OPERATING ROOM | Facility: HOSPITAL | Age: 60
End: 2025-08-21
Payer: COMMERCIAL

## 2025-08-21 ENCOUNTER — HOSPITAL ENCOUNTER (INPATIENT)
Facility: HOSPITAL | Age: 60
LOS: 1 days | Discharge: HOME | End: 2025-08-22
Attending: SURGERY | Admitting: SURGERY
Payer: COMMERCIAL

## 2025-08-21 DIAGNOSIS — R13.10 DYSPHAGIA, UNSPECIFIED TYPE: ICD-10-CM

## 2025-08-21 DIAGNOSIS — K21.9 PARAESOPHAGEAL HERNIA WITH GASTROESOPHAGEAL REFLUX: ICD-10-CM

## 2025-08-21 DIAGNOSIS — K21.9 GASTROESOPHAGEAL REFLUX DISEASE WITHOUT ESOPHAGITIS: ICD-10-CM

## 2025-08-21 DIAGNOSIS — K44.9 PARAESOPHAGEAL HERNIA: Primary | ICD-10-CM

## 2025-08-21 DIAGNOSIS — Z98.84 BARIATRIC SURGERY STATUS: ICD-10-CM

## 2025-08-21 DIAGNOSIS — K44.9 PARAESOPHAGEAL HERNIA WITH GASTROESOPHAGEAL REFLUX: ICD-10-CM

## 2025-08-21 LAB
ABO GROUP (TYPE) IN BLOOD: NORMAL
RH FACTOR (ANTIGEN D): NORMAL

## 2025-08-21 PROCEDURE — 2500000004 HC RX 250 GENERAL PHARMACY W/ HCPCS (ALT 636 FOR OP/ED): Performed by: SURGERY

## 2025-08-21 PROCEDURE — 3700000002 HC GENERAL ANESTHESIA TIME - EACH INCREMENTAL 1 MINUTE: Performed by: SURGERY

## 2025-08-21 PROCEDURE — C1768 GRAFT, VASCULAR: HCPCS | Performed by: SURGERY

## 2025-08-21 PROCEDURE — 2500000001 HC RX 250 WO HCPCS SELF ADMINISTERED DRUGS (ALT 637 FOR MEDICARE OP): Performed by: STUDENT IN AN ORGANIZED HEALTH CARE EDUCATION/TRAINING PROGRAM

## 2025-08-21 PROCEDURE — 36415 COLL VENOUS BLD VENIPUNCTURE: CPT | Performed by: SURGERY

## 2025-08-21 PROCEDURE — 2500000005 HC RX 250 GENERAL PHARMACY W/O HCPCS: Performed by: SURGERY

## 2025-08-21 PROCEDURE — 2500000002 HC RX 250 W HCPCS SELF ADMINISTERED DRUGS (ALT 637 FOR MEDICARE OP, ALT 636 FOR OP/ED): Performed by: ANESTHESIOLOGY

## 2025-08-21 PROCEDURE — 2720000007 HC OR 272 NO HCPCS: Performed by: SURGERY

## 2025-08-21 PROCEDURE — 2500000004 HC RX 250 GENERAL PHARMACY W/ HCPCS (ALT 636 FOR OP/ED): Performed by: ANESTHESIOLOGY

## 2025-08-21 PROCEDURE — 3600000009 HC OR TIME - EACH INCREMENTAL 1 MINUTE - PROCEDURE LEVEL FOUR: Performed by: SURGERY

## 2025-08-21 PROCEDURE — 0DN64ZZ RELEASE STOMACH, PERCUTANEOUS ENDOSCOPIC APPROACH: ICD-10-PCS | Performed by: SURGERY

## 2025-08-21 PROCEDURE — 0DV64ZZ RESTRICTION OF STOMACH, PERCUTANEOUS ENDOSCOPIC APPROACH: ICD-10-PCS | Performed by: SURGERY

## 2025-08-21 PROCEDURE — 0BUT4JZ SUPPLEMENT DIAPHRAGM WITH SYNTHETIC SUBSTITUTE, PERCUTANEOUS ENDOSCOPIC APPROACH: ICD-10-PCS | Performed by: SURGERY

## 2025-08-21 PROCEDURE — C1781 MESH (IMPLANTABLE): HCPCS | Performed by: SURGERY

## 2025-08-21 PROCEDURE — 43282 LAP PARAESOPH HER RPR W/MESH: CPT | Performed by: STUDENT IN AN ORGANIZED HEALTH CARE EDUCATION/TRAINING PROGRAM

## 2025-08-21 PROCEDURE — 7100000001 HC RECOVERY ROOM TIME - INITIAL BASE CHARGE: Performed by: SURGERY

## 2025-08-21 PROCEDURE — 2500000004 HC RX 250 GENERAL PHARMACY W/ HCPCS (ALT 636 FOR OP/ED)

## 2025-08-21 PROCEDURE — 3700000001 HC GENERAL ANESTHESIA TIME - INITIAL BASE CHARGE: Performed by: SURGERY

## 2025-08-21 PROCEDURE — 7100000002 HC RECOVERY ROOM TIME - EACH INCREMENTAL 1 MINUTE: Performed by: SURGERY

## 2025-08-21 PROCEDURE — 2500000004 HC RX 250 GENERAL PHARMACY W/ HCPCS (ALT 636 FOR OP/ED): Performed by: STUDENT IN AN ORGANIZED HEALTH CARE EDUCATION/TRAINING PROGRAM

## 2025-08-21 PROCEDURE — 1100000001 HC PRIVATE ROOM DAILY

## 2025-08-21 PROCEDURE — 2500000005 HC RX 250 GENERAL PHARMACY W/O HCPCS: Performed by: ANESTHESIOLOGY

## 2025-08-21 PROCEDURE — 0DJ08ZZ INSPECTION OF UPPER INTESTINAL TRACT, VIA NATURAL OR ARTIFICIAL OPENING ENDOSCOPIC: ICD-10-PCS | Performed by: SURGERY

## 2025-08-21 PROCEDURE — 2780000003 HC OR 278 NO HCPCS: Performed by: SURGERY

## 2025-08-21 PROCEDURE — 43282 LAP PARAESOPH HER RPR W/MESH: CPT | Performed by: SURGERY

## 2025-08-21 PROCEDURE — 99221 1ST HOSP IP/OBS SF/LOW 40: CPT | Performed by: STUDENT IN AN ORGANIZED HEALTH CARE EDUCATION/TRAINING PROGRAM

## 2025-08-21 PROCEDURE — 3600000004 HC OR TIME - INITIAL BASE CHARGE - PROCEDURE LEVEL FOUR: Performed by: SURGERY

## 2025-08-21 DEVICE — SHEET, FLAT, GORE BIO-A TISSUE REINFORCE, 7 X 10 CM: Type: IMPLANTABLE DEVICE | Site: DIAPHRAGM | Status: FUNCTIONAL

## 2025-08-21 RX ORDER — ONDANSETRON HYDROCHLORIDE 2 MG/ML
4 INJECTION, SOLUTION INTRAVENOUS ONCE AS NEEDED
Status: DISCONTINUED | OUTPATIENT
Start: 2025-08-21 | End: 2025-08-21 | Stop reason: HOSPADM

## 2025-08-21 RX ORDER — SIMETHICONE 80 MG
80 TABLET,CHEWABLE ORAL EVERY 4 HOURS PRN
Status: DISCONTINUED | OUTPATIENT
Start: 2025-08-21 | End: 2025-08-22 | Stop reason: HOSPADM

## 2025-08-21 RX ORDER — ADHESIVE BANDAGE
30 BANDAGE TOPICAL DAILY PRN
Status: DISCONTINUED | OUTPATIENT
Start: 2025-08-21 | End: 2025-08-22 | Stop reason: HOSPADM

## 2025-08-21 RX ORDER — HEPARIN SODIUM 5000 [USP'U]/ML
5000 INJECTION, SOLUTION INTRAVENOUS; SUBCUTANEOUS EVERY 8 HOURS
Status: DISCONTINUED | OUTPATIENT
Start: 2025-08-21 | End: 2025-08-22 | Stop reason: HOSPADM

## 2025-08-21 RX ORDER — SODIUM CHLORIDE, SODIUM LACTATE, POTASSIUM CHLORIDE, CALCIUM CHLORIDE 600; 310; 30; 20 MG/100ML; MG/100ML; MG/100ML; MG/100ML
75 INJECTION, SOLUTION INTRAVENOUS CONTINUOUS
Status: DISCONTINUED | OUTPATIENT
Start: 2025-08-21 | End: 2025-08-22 | Stop reason: HOSPADM

## 2025-08-21 RX ORDER — MEPERIDINE HYDROCHLORIDE 25 MG/ML
12.5 INJECTION INTRAMUSCULAR; INTRAVENOUS; SUBCUTANEOUS EVERY 10 MIN PRN
Status: DISCONTINUED | OUTPATIENT
Start: 2025-08-21 | End: 2025-08-21 | Stop reason: HOSPADM

## 2025-08-21 RX ORDER — DIPHENHYDRAMINE HYDROCHLORIDE 50 MG/ML
12.5 INJECTION, SOLUTION INTRAMUSCULAR; INTRAVENOUS ONCE AS NEEDED
Status: DISCONTINUED | OUTPATIENT
Start: 2025-08-21 | End: 2025-08-21 | Stop reason: HOSPADM

## 2025-08-21 RX ORDER — MINOXIDIL 2.5 MG/1
2.5 TABLET ORAL DAILY
Status: DISCONTINUED | OUTPATIENT
Start: 2025-08-22 | End: 2025-08-22 | Stop reason: HOSPADM

## 2025-08-21 RX ORDER — GABAPENTIN 300 MG/1
600 CAPSULE ORAL ONCE
Status: COMPLETED | OUTPATIENT
Start: 2025-08-21 | End: 2025-08-21

## 2025-08-21 RX ORDER — MIDAZOLAM HYDROCHLORIDE 1 MG/ML
INJECTION, SOLUTION INTRAMUSCULAR; INTRAVENOUS AS NEEDED
Status: DISCONTINUED | OUTPATIENT
Start: 2025-08-21 | End: 2025-08-21

## 2025-08-21 RX ORDER — SODIUM CHLORIDE, SODIUM LACTATE, POTASSIUM CHLORIDE, CALCIUM CHLORIDE 600; 310; 30; 20 MG/100ML; MG/100ML; MG/100ML; MG/100ML
20 INJECTION, SOLUTION INTRAVENOUS CONTINUOUS
Status: ACTIVE | OUTPATIENT
Start: 2025-08-21 | End: 2025-08-22

## 2025-08-21 RX ORDER — CEFAZOLIN 1 G/1
INJECTION, POWDER, FOR SOLUTION INTRAVENOUS AS NEEDED
Status: DISCONTINUED | OUTPATIENT
Start: 2025-08-21 | End: 2025-08-21

## 2025-08-21 RX ORDER — ACETAMINOPHEN 10 MG/ML
INJECTION, SOLUTION INTRAVENOUS AS NEEDED
Status: DISCONTINUED | OUTPATIENT
Start: 2025-08-21 | End: 2025-08-21

## 2025-08-21 RX ORDER — FENTANYL CITRATE 50 UG/ML
INJECTION, SOLUTION INTRAMUSCULAR; INTRAVENOUS AS NEEDED
Status: DISCONTINUED | OUTPATIENT
Start: 2025-08-21 | End: 2025-08-21

## 2025-08-21 RX ORDER — METOCLOPRAMIDE HYDROCHLORIDE 5 MG/ML
10 INJECTION INTRAMUSCULAR; INTRAVENOUS EVERY 6 HOURS PRN
Status: DISCONTINUED | OUTPATIENT
Start: 2025-08-21 | End: 2025-08-22 | Stop reason: HOSPADM

## 2025-08-21 RX ORDER — ACETAMINOPHEN 10 MG/ML
1000 INJECTION, SOLUTION INTRAVENOUS EVERY 6 HOURS
Status: DISCONTINUED | OUTPATIENT
Start: 2025-08-21 | End: 2025-08-22 | Stop reason: HOSPADM

## 2025-08-21 RX ORDER — HYDRALAZINE HYDROCHLORIDE 20 MG/ML
5 INJECTION INTRAMUSCULAR; INTRAVENOUS EVERY 4 HOURS PRN
Status: DISCONTINUED | OUTPATIENT
Start: 2025-08-21 | End: 2025-08-22 | Stop reason: HOSPADM

## 2025-08-21 RX ORDER — OXYCODONE HYDROCHLORIDE 5 MG/1
5 TABLET ORAL EVERY 4 HOURS PRN
Status: DISCONTINUED | OUTPATIENT
Start: 2025-08-21 | End: 2025-08-21 | Stop reason: HOSPADM

## 2025-08-21 RX ORDER — ONDANSETRON HYDROCHLORIDE 2 MG/ML
4 INJECTION, SOLUTION INTRAVENOUS EVERY 8 HOURS PRN
Status: DISCONTINUED | OUTPATIENT
Start: 2025-08-21 | End: 2025-08-22 | Stop reason: HOSPADM

## 2025-08-21 RX ORDER — LIDOCAINE 560 MG/1
1 PATCH PERCUTANEOUS; TOPICAL; TRANSDERMAL EVERY 24 HOURS
Status: DISCONTINUED | OUTPATIENT
Start: 2025-08-21 | End: 2025-08-22 | Stop reason: HOSPADM

## 2025-08-21 RX ORDER — NALOXONE HYDROCHLORIDE 0.4 MG/ML
0.2 INJECTION, SOLUTION INTRAMUSCULAR; INTRAVENOUS; SUBCUTANEOUS EVERY 5 MIN PRN
Status: DISCONTINUED | OUTPATIENT
Start: 2025-08-21 | End: 2025-08-22 | Stop reason: HOSPADM

## 2025-08-21 RX ORDER — SUCCINYLCHOLINE CHLORIDE 20 MG/ML
INJECTION INTRAMUSCULAR; INTRAVENOUS AS NEEDED
Status: DISCONTINUED | OUTPATIENT
Start: 2025-08-21 | End: 2025-08-21

## 2025-08-21 RX ORDER — NORETHINDRONE AND ETHINYL ESTRADIOL 0.5-0.035
KIT ORAL AS NEEDED
Status: DISCONTINUED | OUTPATIENT
Start: 2025-08-21 | End: 2025-08-21

## 2025-08-21 RX ORDER — LIDOCAINE HCL/PF 100 MG/5ML
SYRINGE (ML) INTRAVENOUS AS NEEDED
Status: DISCONTINUED | OUTPATIENT
Start: 2025-08-21 | End: 2025-08-21

## 2025-08-21 RX ORDER — SODIUM CHLORIDE 0.9 G/100ML
INJECTION, SOLUTION IRRIGATION AS NEEDED
Status: DISCONTINUED | OUTPATIENT
Start: 2025-08-21 | End: 2025-08-21 | Stop reason: HOSPADM

## 2025-08-21 RX ORDER — ALBUTEROL SULFATE 0.83 MG/ML
2.5 SOLUTION RESPIRATORY (INHALATION) ONCE AS NEEDED
Status: DISCONTINUED | OUTPATIENT
Start: 2025-08-21 | End: 2025-08-21 | Stop reason: HOSPADM

## 2025-08-21 RX ORDER — KETOROLAC TROMETHAMINE 15 MG/ML
15 INJECTION, SOLUTION INTRAMUSCULAR; INTRAVENOUS EVERY 6 HOURS SCHEDULED
Status: DISCONTINUED | OUTPATIENT
Start: 2025-08-21 | End: 2025-08-22 | Stop reason: HOSPADM

## 2025-08-21 RX ORDER — OXYCODONE HCL 5 MG/5 ML
5 SOLUTION, ORAL ORAL EVERY 4 HOURS PRN
Status: DISCONTINUED | OUTPATIENT
Start: 2025-08-21 | End: 2025-08-22 | Stop reason: HOSPADM

## 2025-08-21 RX ORDER — ROCURONIUM BROMIDE 10 MG/ML
INJECTION, SOLUTION INTRAVENOUS AS NEEDED
Status: DISCONTINUED | OUTPATIENT
Start: 2025-08-21 | End: 2025-08-21

## 2025-08-21 RX ORDER — PANTOPRAZOLE SODIUM 40 MG/10ML
40 INJECTION, POWDER, LYOPHILIZED, FOR SOLUTION INTRAVENOUS
Status: DISCONTINUED | OUTPATIENT
Start: 2025-08-22 | End: 2025-08-22 | Stop reason: HOSPADM

## 2025-08-21 RX ORDER — SODIUM CHLORIDE, SODIUM LACTATE, POTASSIUM CHLORIDE, CALCIUM CHLORIDE 600; 310; 30; 20 MG/100ML; MG/100ML; MG/100ML; MG/100ML
100 INJECTION, SOLUTION INTRAVENOUS CONTINUOUS
Status: DISCONTINUED | OUTPATIENT
Start: 2025-08-21 | End: 2025-08-21 | Stop reason: HOSPADM

## 2025-08-21 RX ORDER — OXYCODONE HCL 5 MG/5 ML
10 SOLUTION, ORAL ORAL EVERY 4 HOURS PRN
Status: DISCONTINUED | OUTPATIENT
Start: 2025-08-21 | End: 2025-08-22 | Stop reason: HOSPADM

## 2025-08-21 RX ORDER — PROPOFOL 10 MG/ML
INJECTION, EMULSION INTRAVENOUS CONTINUOUS PRN
Status: DISCONTINUED | OUTPATIENT
Start: 2025-08-21 | End: 2025-08-21

## 2025-08-21 RX ORDER — ONDANSETRON HYDROCHLORIDE 2 MG/ML
INJECTION, SOLUTION INTRAVENOUS AS NEEDED
Status: DISCONTINUED | OUTPATIENT
Start: 2025-08-21 | End: 2025-08-21

## 2025-08-21 RX ORDER — PHENYLEPHRINE HYDROCHLORIDE 10 MG/ML
INJECTION INTRAVENOUS AS NEEDED
Status: DISCONTINUED | OUTPATIENT
Start: 2025-08-21 | End: 2025-08-21

## 2025-08-21 RX ORDER — SCOPOLAMINE 1 MG/3D
1 PATCH, EXTENDED RELEASE TRANSDERMAL ONCE
Status: DISCONTINUED | OUTPATIENT
Start: 2025-08-21 | End: 2025-08-21

## 2025-08-21 RX ORDER — DROPERIDOL 2.5 MG/ML
0.62 INJECTION, SOLUTION INTRAMUSCULAR; INTRAVENOUS ONCE AS NEEDED
Status: DISCONTINUED | OUTPATIENT
Start: 2025-08-21 | End: 2025-08-21 | Stop reason: HOSPADM

## 2025-08-21 RX ORDER — APREPITANT 40 MG/1
40 CAPSULE ORAL ONCE
Status: COMPLETED | OUTPATIENT
Start: 2025-08-21 | End: 2025-08-21

## 2025-08-21 RX ORDER — KETOROLAC TROMETHAMINE 30 MG/ML
INJECTION, SOLUTION INTRAMUSCULAR; INTRAVENOUS AS NEEDED
Status: DISCONTINUED | OUTPATIENT
Start: 2025-08-21 | End: 2025-08-21

## 2025-08-21 RX ADMIN — HYDROMORPHONE HYDROCHLORIDE 0.2 MG: 2 INJECTION, SOLUTION INTRAMUSCULAR; INTRAVENOUS; SUBCUTANEOUS at 12:23

## 2025-08-21 RX ADMIN — APREPITANT 40 MG: 40 CAPSULE ORAL at 11:29

## 2025-08-21 RX ADMIN — HEPARIN SODIUM 5000 UNITS: 5000 INJECTION, SOLUTION INTRAVENOUS; SUBCUTANEOUS at 16:27

## 2025-08-21 RX ADMIN — SODIUM CHLORIDE, POTASSIUM CHLORIDE, SODIUM LACTATE AND CALCIUM CHLORIDE: 600; 310; 30; 20 INJECTION, SOLUTION INTRAVENOUS at 12:24

## 2025-08-21 RX ADMIN — KETOROLAC TROMETHAMINE 30 MG: 30 INJECTION, SOLUTION INTRAMUSCULAR at 13:22

## 2025-08-21 RX ADMIN — PHENYLEPHRINE HYDROCHLORIDE 200 MCG: 10 INJECTION INTRAVENOUS at 12:01

## 2025-08-21 RX ADMIN — ROCURONIUM BROMIDE 20 MG: 10 INJECTION, SOLUTION INTRAVENOUS at 11:52

## 2025-08-21 RX ADMIN — VANCOMYCIN HYDROCHLORIDE 1500 MG: 10 INJECTION, POWDER, LYOPHILIZED, FOR SOLUTION INTRAVENOUS at 11:33

## 2025-08-21 RX ADMIN — SODIUM CHLORIDE, POTASSIUM CHLORIDE, SODIUM LACTATE AND CALCIUM CHLORIDE 20 ML/HR: 600; 310; 30; 20 INJECTION, SOLUTION INTRAVENOUS at 11:10

## 2025-08-21 RX ADMIN — OXYCODONE HYDROCHLORIDE 10 MG: 5 SOLUTION ORAL at 16:36

## 2025-08-21 RX ADMIN — PHENYLEPHRINE HYDROCHLORIDE 80 MCG: 10 INJECTION INTRAVENOUS at 12:38

## 2025-08-21 RX ADMIN — SCOPOLAMINE 1 PATCH: 1.5 PATCH, EXTENDED RELEASE TRANSDERMAL at 11:10

## 2025-08-21 RX ADMIN — EPHEDRINE SULFATE 15 MG: 50 INJECTION, SOLUTION INTRAVENOUS at 12:05

## 2025-08-21 RX ADMIN — ONDANSETRON 4 MG: 2 INJECTION, SOLUTION INTRAMUSCULAR; INTRAVENOUS at 11:58

## 2025-08-21 RX ADMIN — PROPOFOL 25 MCG/KG/MIN: 10 INJECTION, EMULSION INTRAVENOUS at 12:02

## 2025-08-21 RX ADMIN — KETOROLAC TROMETHAMINE 15 MG: 15 INJECTION, SOLUTION INTRAMUSCULAR; INTRAVENOUS at 18:08

## 2025-08-21 RX ADMIN — EPHEDRINE SULFATE 5 MG: 50 INJECTION, SOLUTION INTRAVENOUS at 12:41

## 2025-08-21 RX ADMIN — ACETAMINOPHEN 1000 MG: 10 INJECTION INTRAVENOUS at 16:27

## 2025-08-21 RX ADMIN — ACETAMINOPHEN 1000 MG: 10 INJECTION INTRAVENOUS at 22:06

## 2025-08-21 RX ADMIN — ONDANSETRON 4 MG: 2 INJECTION, SOLUTION INTRAMUSCULAR; INTRAVENOUS at 16:36

## 2025-08-21 RX ADMIN — OXYCODONE HYDROCHLORIDE 10 MG: 5 SOLUTION ORAL at 20:41

## 2025-08-21 RX ADMIN — PHENYLEPHRINE HYDROCHLORIDE 40 MCG: 10 INJECTION INTRAVENOUS at 12:41

## 2025-08-21 RX ADMIN — ROCURONIUM BROMIDE 20 MG: 10 INJECTION, SOLUTION INTRAVENOUS at 12:25

## 2025-08-21 RX ADMIN — DEXAMETHASONE SODIUM PHOSPHATE 8 MG: 4 INJECTION INTRA-ARTICULAR; INTRALESIONAL; INTRAMUSCULAR; INTRAVENOUS; SOFT TISSUE at 11:57

## 2025-08-21 RX ADMIN — PROPOFOL 150 MG: 10 INJECTION, EMULSION INTRAVENOUS at 11:52

## 2025-08-21 RX ADMIN — ROCURONIUM BROMIDE 20 MG: 10 INJECTION, SOLUTION INTRAVENOUS at 12:34

## 2025-08-21 RX ADMIN — GABAPENTIN 600 MG: 300 CAPSULE ORAL at 11:09

## 2025-08-21 RX ADMIN — ROCURONIUM BROMIDE 30 MG: 10 INJECTION, SOLUTION INTRAVENOUS at 11:55

## 2025-08-21 RX ADMIN — CEFAZOLIN 2 G: 1 INJECTION, POWDER, FOR SOLUTION INTRAMUSCULAR; INTRAVENOUS at 11:56

## 2025-08-21 RX ADMIN — MIDAZOLAM 2 MG: 1 INJECTION INTRAMUSCULAR; INTRAVENOUS at 11:40

## 2025-08-21 RX ADMIN — ONDANSETRON 4 MG: 2 INJECTION, SOLUTION INTRAMUSCULAR; INTRAVENOUS at 13:16

## 2025-08-21 RX ADMIN — SUCCINYLCHOLINE CHLORIDE 200 MG: 20 INJECTION, SOLUTION INTRAMUSCULAR; INTRAVENOUS at 11:52

## 2025-08-21 RX ADMIN — FENTANYL CITRATE 50 MCG: 50 INJECTION, SOLUTION INTRAMUSCULAR; INTRAVENOUS at 12:16

## 2025-08-21 RX ADMIN — LIDOCAINE HYDROCHLORIDE 100 MG: 20 INJECTION INTRAVENOUS at 11:51

## 2025-08-21 RX ADMIN — SODIUM CHLORIDE, SODIUM LACTATE, POTASSIUM CHLORIDE, AND CALCIUM CHLORIDE 75 ML/HR: .6; .31; .03; .02 INJECTION, SOLUTION INTRAVENOUS at 20:45

## 2025-08-21 RX ADMIN — PHENYLEPHRINE HYDROCHLORIDE 80 MCG: 10 INJECTION INTRAVENOUS at 12:12

## 2025-08-21 RX ADMIN — PROPOFOL 20 MG: 10 INJECTION, EMULSION INTRAVENOUS at 13:30

## 2025-08-21 RX ADMIN — HYDROMORPHONE HYDROCHLORIDE 0.5 MG: 1 INJECTION, SOLUTION INTRAMUSCULAR; INTRAVENOUS; SUBCUTANEOUS at 14:01

## 2025-08-21 RX ADMIN — HYDROMORPHONE HYDROCHLORIDE 0.2 MG: 2 INJECTION, SOLUTION INTRAMUSCULAR; INTRAVENOUS; SUBCUTANEOUS at 12:34

## 2025-08-21 RX ADMIN — SIMETHICONE 80 MG: 80 TABLET, CHEWABLE ORAL at 20:41

## 2025-08-21 RX ADMIN — ACETAMINOPHEN 1000 MG: 1000 INJECTION, SOLUTION INTRAVENOUS at 13:13

## 2025-08-21 RX ADMIN — HYDROMORPHONE HYDROCHLORIDE 0.25 MG: 1 INJECTION, SOLUTION INTRAMUSCULAR; INTRAVENOUS; SUBCUTANEOUS at 14:19

## 2025-08-21 RX ADMIN — PHENYLEPHRINE HYDROCHLORIDE 40 MCG: 10 INJECTION INTRAVENOUS at 12:45

## 2025-08-21 RX ADMIN — FENTANYL CITRATE 50 MCG: 50 INJECTION, SOLUTION INTRAMUSCULAR; INTRAVENOUS at 11:51

## 2025-08-21 SDOH — SOCIAL STABILITY: SOCIAL INSECURITY
WITHIN THE LAST YEAR, HAVE YOU BEEN RAPED OR FORCED TO HAVE ANY KIND OF SEXUAL ACTIVITY BY YOUR PARTNER OR EX-PARTNER?: NO

## 2025-08-21 SDOH — SOCIAL STABILITY: SOCIAL INSECURITY: DO YOU FEEL ANYONE HAS EXPLOITED OR TAKEN ADVANTAGE OF YOU FINANCIALLY OR OF YOUR PERSONAL PROPERTY?: NO

## 2025-08-21 SDOH — ECONOMIC STABILITY: FOOD INSECURITY: WITHIN THE PAST 12 MONTHS, THE FOOD YOU BOUGHT JUST DIDN'T LAST AND YOU DIDN'T HAVE MONEY TO GET MORE.: NEVER TRUE

## 2025-08-21 SDOH — SOCIAL STABILITY: SOCIAL INSECURITY: ARE THERE ANY APPARENT SIGNS OF INJURIES/BEHAVIORS THAT COULD BE RELATED TO ABUSE/NEGLECT?: NO

## 2025-08-21 SDOH — SOCIAL STABILITY: SOCIAL INSECURITY
WITHIN THE LAST YEAR, HAVE YOU BEEN KICKED, HIT, SLAPPED, OR OTHERWISE PHYSICALLY HURT BY YOUR PARTNER OR EX-PARTNER?: NO

## 2025-08-21 SDOH — SOCIAL STABILITY: SOCIAL INSECURITY: HAVE YOU HAD THOUGHTS OF HARMING ANYONE ELSE?: NO

## 2025-08-21 SDOH — ECONOMIC STABILITY: FOOD INSECURITY: HOW HARD IS IT FOR YOU TO PAY FOR THE VERY BASICS LIKE FOOD, HOUSING, MEDICAL CARE, AND HEATING?: NOT VERY HARD

## 2025-08-21 SDOH — SOCIAL STABILITY: SOCIAL INSECURITY: WITHIN THE LAST YEAR, HAVE YOU BEEN AFRAID OF YOUR PARTNER OR EX-PARTNER?: NO

## 2025-08-21 SDOH — SOCIAL STABILITY: SOCIAL INSECURITY: WITHIN THE LAST YEAR, HAVE YOU BEEN HUMILIATED OR EMOTIONALLY ABUSED IN OTHER WAYS BY YOUR PARTNER OR EX-PARTNER?: NO

## 2025-08-21 SDOH — HEALTH STABILITY: MENTAL HEALTH
DO YOU FEEL STRESS - TENSE, RESTLESS, NERVOUS, OR ANXIOUS, OR UNABLE TO SLEEP AT NIGHT BECAUSE YOUR MIND IS TROUBLED ALL THE TIME - THESE DAYS?: NOT AT ALL

## 2025-08-21 SDOH — ECONOMIC STABILITY: INCOME INSECURITY: IN THE PAST 12 MONTHS HAS THE ELECTRIC, GAS, OIL, OR WATER COMPANY THREATENED TO SHUT OFF SERVICES IN YOUR HOME?: NO

## 2025-08-21 SDOH — ECONOMIC STABILITY: HOUSING INSECURITY: IN THE LAST 12 MONTHS, WAS THERE A TIME WHEN YOU WERE NOT ABLE TO PAY THE MORTGAGE OR RENT ON TIME?: NO

## 2025-08-21 SDOH — SOCIAL STABILITY: SOCIAL INSECURITY: WERE YOU ABLE TO COMPLETE ALL THE BEHAVIORAL HEALTH SCREENINGS?: YES

## 2025-08-21 SDOH — ECONOMIC STABILITY: HOUSING INSECURITY: AT ANY TIME IN THE PAST 12 MONTHS, WERE YOU HOMELESS OR LIVING IN A SHELTER (INCLUDING NOW)?: NO

## 2025-08-21 SDOH — SOCIAL STABILITY: SOCIAL INSECURITY: DO YOU FEEL UNSAFE GOING BACK TO THE PLACE WHERE YOU ARE LIVING?: NO

## 2025-08-21 SDOH — ECONOMIC STABILITY: FOOD INSECURITY: WITHIN THE PAST 12 MONTHS, YOU WORRIED THAT YOUR FOOD WOULD RUN OUT BEFORE YOU GOT THE MONEY TO BUY MORE.: NEVER TRUE

## 2025-08-21 SDOH — ECONOMIC STABILITY: HOUSING INSECURITY: IN THE PAST 12 MONTHS, HOW MANY TIMES HAVE YOU MOVED WHERE YOU WERE LIVING?: 0

## 2025-08-21 SDOH — SOCIAL STABILITY: SOCIAL INSECURITY: DOES ANYONE TRY TO KEEP YOU FROM HAVING/CONTACTING OTHER FRIENDS OR DOING THINGS OUTSIDE YOUR HOME?: NO

## 2025-08-21 SDOH — SOCIAL STABILITY: SOCIAL INSECURITY: ABUSE: ADULT

## 2025-08-21 SDOH — SOCIAL STABILITY: SOCIAL INSECURITY: HAS ANYONE EVER THREATENED TO HURT YOUR FAMILY OR YOUR PETS?: NO

## 2025-08-21 SDOH — ECONOMIC STABILITY: TRANSPORTATION INSECURITY: IN THE PAST 12 MONTHS, HAS LACK OF TRANSPORTATION KEPT YOU FROM MEDICAL APPOINTMENTS OR FROM GETTING MEDICATIONS?: NO

## 2025-08-21 SDOH — SOCIAL STABILITY: SOCIAL INSECURITY: ARE YOU OR HAVE YOU BEEN THREATENED OR ABUSED PHYSICALLY, EMOTIONALLY, OR SEXUALLY BY ANYONE?: NO

## 2025-08-21 SDOH — HEALTH STABILITY: MENTAL HEALTH: CURRENT SMOKER: 0

## 2025-08-21 SDOH — SOCIAL STABILITY: SOCIAL INSECURITY: HAVE YOU HAD ANY THOUGHTS OF HARMING ANYONE ELSE?: NO

## 2025-08-21 ASSESSMENT — ACTIVITIES OF DAILY LIVING (ADL)
PATIENT'S MEMORY ADEQUATE TO SAFELY COMPLETE DAILY ACTIVITIES?: YES
BATHING: INDEPENDENT
LACK_OF_TRANSPORTATION: NO
DRESSING YOURSELF: INDEPENDENT
JUDGMENT_ADEQUATE_SAFELY_COMPLETE_DAILY_ACTIVITIES: YES
ADEQUATE_TO_COMPLETE_ADL: YES
HEARING - RIGHT EAR: FUNCTIONAL
HEARING - LEFT EAR: FUNCTIONAL
TOILETING: INDEPENDENT
LACK_OF_TRANSPORTATION: NO
GROOMING: INDEPENDENT
LACK_OF_TRANSPORTATION: NO
WALKS IN HOME: INDEPENDENT
FEEDING YOURSELF: INDEPENDENT

## 2025-08-21 ASSESSMENT — COGNITIVE AND FUNCTIONAL STATUS - GENERAL
MOBILITY SCORE: 24
MOBILITY SCORE: 24
DAILY ACTIVITIY SCORE: 24
DAILY ACTIVITIY SCORE: 24
PATIENT BASELINE BEDBOUND: NO

## 2025-08-21 ASSESSMENT — PAIN SCALES - GENERAL
PAINLEVEL_OUTOF10: 6
PAIN_LEVEL: 2
PAINLEVEL_OUTOF10: 7
PAINLEVEL_OUTOF10: 4
PAINLEVEL_OUTOF10: 8
PAINLEVEL_OUTOF10: 10 - WORST POSSIBLE PAIN

## 2025-08-21 ASSESSMENT — PAIN - FUNCTIONAL ASSESSMENT
PAIN_FUNCTIONAL_ASSESSMENT: 0-10
PAIN_FUNCTIONAL_ASSESSMENT: 0-10
PAIN_FUNCTIONAL_ASSESSMENT: UNABLE TO SELF-REPORT
PAIN_FUNCTIONAL_ASSESSMENT: 0-10
PAIN_FUNCTIONAL_ASSESSMENT: UNABLE TO SELF-REPORT

## 2025-08-21 ASSESSMENT — PATIENT HEALTH QUESTIONNAIRE - PHQ9
2. FEELING DOWN, DEPRESSED OR HOPELESS: NOT AT ALL
SUM OF ALL RESPONSES TO PHQ9 QUESTIONS 1 & 2: 0
1. LITTLE INTEREST OR PLEASURE IN DOING THINGS: NOT AT ALL

## 2025-08-21 ASSESSMENT — PAIN DESCRIPTION - LOCATION: LOCATION: ABDOMEN

## 2025-08-21 ASSESSMENT — LIFESTYLE VARIABLES
HOW OFTEN DO YOU HAVE 6 OR MORE DRINKS ON ONE OCCASION: NEVER
HOW MANY STANDARD DRINKS CONTAINING ALCOHOL DO YOU HAVE ON A TYPICAL DAY: PATIENT DOES NOT DRINK
AUDIT-C TOTAL SCORE: 0
HOW OFTEN DO YOU HAVE A DRINK CONTAINING ALCOHOL: NEVER
SKIP TO QUESTIONS 9-10: 1
AUDIT-C TOTAL SCORE: 0

## 2025-08-21 ASSESSMENT — PAIN DESCRIPTION - DESCRIPTORS: DESCRIPTORS: SORE

## 2025-08-22 ENCOUNTER — PHARMACY VISIT (OUTPATIENT)
Dept: PHARMACY | Facility: CLINIC | Age: 60
End: 2025-08-22
Payer: COMMERCIAL

## 2025-08-22 ENCOUNTER — APPOINTMENT (OUTPATIENT)
Dept: RADIOLOGY | Facility: HOSPITAL | Age: 60
End: 2025-08-22
Payer: COMMERCIAL

## 2025-08-22 VITALS
HEART RATE: 59 BPM | BODY MASS INDEX: 30.26 KG/M2 | RESPIRATION RATE: 15 BRPM | HEIGHT: 61 IN | SYSTOLIC BLOOD PRESSURE: 107 MMHG | DIASTOLIC BLOOD PRESSURE: 54 MMHG | WEIGHT: 160.3 LBS | TEMPERATURE: 97.7 F | OXYGEN SATURATION: 95 %

## 2025-08-22 LAB
ALBUMIN SERPL BCP-MCNC: 3.3 G/DL (ref 3.4–5)
ALP SERPL-CCNC: 32 U/L (ref 33–136)
ALT SERPL W P-5'-P-CCNC: 32 U/L (ref 7–45)
ANION GAP SERPL CALC-SCNC: 10 MMOL/L (ref 10–20)
AST SERPL W P-5'-P-CCNC: 33 U/L (ref 9–39)
BASOPHILS # BLD AUTO: 0.03 X10*3/UL (ref 0–0.1)
BASOPHILS NFR BLD AUTO: 0.3 %
BILIRUB SERPL-MCNC: 0.4 MG/DL (ref 0–1.2)
BUN SERPL-MCNC: 8 MG/DL (ref 6–23)
CALCIUM SERPL-MCNC: 8.4 MG/DL (ref 8.6–10.3)
CHLORIDE SERPL-SCNC: 101 MMOL/L (ref 98–107)
CO2 SERPL-SCNC: 29 MMOL/L (ref 21–32)
CREAT SERPL-MCNC: 0.73 MG/DL (ref 0.5–1.05)
EGFRCR SERPLBLD CKD-EPI 2021: >90 ML/MIN/1.73M*2
EOSINOPHIL # BLD AUTO: 0.05 X10*3/UL (ref 0–0.7)
EOSINOPHIL NFR BLD AUTO: 0.5 %
ERYTHROCYTE [DISTWIDTH] IN BLOOD BY AUTOMATED COUNT: 14.6 % (ref 11.5–14.5)
GLUCOSE SERPL-MCNC: 94 MG/DL (ref 74–99)
HCT VFR BLD AUTO: 35.3 % (ref 36–46)
HGB BLD-MCNC: 11.5 G/DL (ref 12–16)
IMM GRANULOCYTES # BLD AUTO: 0.02 X10*3/UL (ref 0–0.7)
IMM GRANULOCYTES NFR BLD AUTO: 0.2 % (ref 0–0.9)
LYMPHOCYTES # BLD AUTO: 1.76 X10*3/UL (ref 1.2–4.8)
LYMPHOCYTES NFR BLD AUTO: 16.6 %
MCH RBC QN AUTO: 27.2 PG (ref 26–34)
MCHC RBC AUTO-ENTMCNC: 32.6 G/DL (ref 32–36)
MCV RBC AUTO: 84 FL (ref 80–100)
MONOCYTES # BLD AUTO: 0.71 X10*3/UL (ref 0.1–1)
MONOCYTES NFR BLD AUTO: 6.7 %
NEUTROPHILS # BLD AUTO: 8.02 X10*3/UL (ref 1.2–7.7)
NEUTROPHILS NFR BLD AUTO: 75.7 %
NRBC BLD-RTO: 0 /100 WBCS (ref 0–0)
PLATELET # BLD AUTO: 300 X10*3/UL (ref 150–450)
POTASSIUM SERPL-SCNC: 4.1 MMOL/L (ref 3.5–5.3)
PROT SERPL-MCNC: 5.4 G/DL (ref 6.4–8.2)
RBC # BLD AUTO: 4.23 X10*6/UL (ref 4–5.2)
SODIUM SERPL-SCNC: 136 MMOL/L (ref 136–145)
WBC # BLD AUTO: 10.6 X10*3/UL (ref 4.4–11.3)

## 2025-08-22 PROCEDURE — 74220 X-RAY XM ESOPHAGUS 1CNTRST: CPT

## 2025-08-22 PROCEDURE — 2500000001 HC RX 250 WO HCPCS SELF ADMINISTERED DRUGS (ALT 637 FOR MEDICARE OP): Performed by: STUDENT IN AN ORGANIZED HEALTH CARE EDUCATION/TRAINING PROGRAM

## 2025-08-22 PROCEDURE — 74220 X-RAY XM ESOPHAGUS 1CNTRST: CPT | Performed by: STUDENT IN AN ORGANIZED HEALTH CARE EDUCATION/TRAINING PROGRAM

## 2025-08-22 PROCEDURE — 36415 COLL VENOUS BLD VENIPUNCTURE: CPT | Performed by: STUDENT IN AN ORGANIZED HEALTH CARE EDUCATION/TRAINING PROGRAM

## 2025-08-22 PROCEDURE — 2550000001 HC RX 255 CONTRASTS: Performed by: SURGERY

## 2025-08-22 PROCEDURE — RXMED WILLOW AMBULATORY MEDICATION CHARGE

## 2025-08-22 PROCEDURE — 85025 COMPLETE CBC W/AUTO DIFF WBC: CPT | Performed by: STUDENT IN AN ORGANIZED HEALTH CARE EDUCATION/TRAINING PROGRAM

## 2025-08-22 PROCEDURE — 84075 ASSAY ALKALINE PHOSPHATASE: CPT | Performed by: STUDENT IN AN ORGANIZED HEALTH CARE EDUCATION/TRAINING PROGRAM

## 2025-08-22 PROCEDURE — 2500000004 HC RX 250 GENERAL PHARMACY W/ HCPCS (ALT 636 FOR OP/ED): Performed by: STUDENT IN AN ORGANIZED HEALTH CARE EDUCATION/TRAINING PROGRAM

## 2025-08-22 RX ORDER — OXYCODONE HYDROCHLORIDE 5 MG/1
5 TABLET ORAL EVERY 6 HOURS PRN
Qty: 15 TABLET | Refills: 0 | Status: SHIPPED | OUTPATIENT
Start: 2025-08-22 | End: 2025-08-22 | Stop reason: HOSPADM

## 2025-08-22 RX ORDER — OXYCODONE HCL 5 MG/5 ML
5 SOLUTION, ORAL ORAL EVERY 6 HOURS PRN
Qty: 100 ML | Refills: 0 | Status: SHIPPED | OUTPATIENT
Start: 2025-08-22 | End: 2025-08-27

## 2025-08-22 RX ORDER — ONDANSETRON 4 MG/1
4 TABLET, ORALLY DISINTEGRATING ORAL EVERY 8 HOURS PRN
Qty: 30 TABLET | Refills: 0 | Status: SHIPPED | OUTPATIENT
Start: 2025-08-22 | End: 2025-09-05

## 2025-08-22 RX ORDER — DIATRIZOATE MEGLUMINE AND DIATRIZOATE SODIUM 660; 100 MG/ML; MG/ML
30 SOLUTION ORAL; RECTAL ONCE
Status: COMPLETED | OUTPATIENT
Start: 2025-08-22 | End: 2025-08-22

## 2025-08-22 RX ADMIN — HEPARIN SODIUM 5000 UNITS: 5000 INJECTION, SOLUTION INTRAVENOUS; SUBCUTANEOUS at 00:02

## 2025-08-22 RX ADMIN — OXYCODONE HYDROCHLORIDE 10 MG: 5 SOLUTION ORAL at 09:55

## 2025-08-22 RX ADMIN — SIMETHICONE 80 MG: 80 TABLET, CHEWABLE ORAL at 09:59

## 2025-08-22 RX ADMIN — ACETAMINOPHEN 1000 MG: 10 INJECTION INTRAVENOUS at 09:47

## 2025-08-22 RX ADMIN — OXYCODONE HYDROCHLORIDE 10 MG: 5 SOLUTION ORAL at 05:08

## 2025-08-22 RX ADMIN — OXYCODONE HYDROCHLORIDE 10 MG: 5 SOLUTION ORAL at 00:45

## 2025-08-22 RX ADMIN — DIATRIZOATE MEGLUMINE AND DIATRIZOATE SODIUM 30 ML: 660; 100 LIQUID ORAL; RECTAL at 09:35

## 2025-08-22 RX ADMIN — PANTOPRAZOLE SODIUM 40 MG: 40 INJECTION, POWDER, FOR SOLUTION INTRAVENOUS at 06:04

## 2025-08-22 RX ADMIN — SODIUM CHLORIDE, SODIUM LACTATE, POTASSIUM CHLORIDE, AND CALCIUM CHLORIDE 75 ML/HR: .6; .31; .03; .02 INJECTION, SOLUTION INTRAVENOUS at 12:00

## 2025-08-22 RX ADMIN — SIMETHICONE 80 MG: 80 TABLET, CHEWABLE ORAL at 05:08

## 2025-08-22 RX ADMIN — KETOROLAC TROMETHAMINE 15 MG: 15 INJECTION, SOLUTION INTRAMUSCULAR; INTRAVENOUS at 00:02

## 2025-08-22 RX ADMIN — ACETAMINOPHEN 1000 MG: 10 INJECTION INTRAVENOUS at 04:10

## 2025-08-22 RX ADMIN — ACETAMINOPHEN 1000 MG: 10 INJECTION INTRAVENOUS at 15:45

## 2025-08-22 RX ADMIN — OXYCODONE HYDROCHLORIDE 5 MG: 5 SOLUTION ORAL at 15:46

## 2025-08-22 RX ADMIN — SIMETHICONE 80 MG: 80 TABLET, CHEWABLE ORAL at 00:40

## 2025-08-22 RX ADMIN — KETOROLAC TROMETHAMINE 15 MG: 15 INJECTION, SOLUTION INTRAMUSCULAR; INTRAVENOUS at 12:02

## 2025-08-22 RX ADMIN — HEPARIN SODIUM 5000 UNITS: 5000 INJECTION, SOLUTION INTRAVENOUS; SUBCUTANEOUS at 08:50

## 2025-08-22 RX ADMIN — HEPARIN SODIUM 5000 UNITS: 5000 INJECTION, SOLUTION INTRAVENOUS; SUBCUTANEOUS at 15:46

## 2025-08-22 RX ADMIN — KETOROLAC TROMETHAMINE 15 MG: 15 INJECTION, SOLUTION INTRAMUSCULAR; INTRAVENOUS at 06:04

## 2025-08-22 ASSESSMENT — PAIN DESCRIPTION - DESCRIPTORS
DESCRIPTORS: DISCOMFORT
DESCRIPTORS: ACHING;DISCOMFORT
DESCRIPTORS: DISCOMFORT

## 2025-08-22 ASSESSMENT — PAIN - FUNCTIONAL ASSESSMENT
PAIN_FUNCTIONAL_ASSESSMENT: UNABLE TO SELF-REPORT
PAIN_FUNCTIONAL_ASSESSMENT: 0-10

## 2025-08-22 ASSESSMENT — PAIN SCALES - GENERAL
PAINLEVEL_OUTOF10: 8
PAINLEVEL_OUTOF10: 8
PAINLEVEL_OUTOF10: 6
PAINLEVEL_OUTOF10: 6
PAINLEVEL_OUTOF10: 8
PAINLEVEL_OUTOF10: 6
PAINLEVEL_OUTOF10: 6
PAINLEVEL_OUTOF10: 7

## 2025-08-22 ASSESSMENT — COGNITIVE AND FUNCTIONAL STATUS - GENERAL
DAILY ACTIVITIY SCORE: 24
MOBILITY SCORE: 24

## 2025-08-22 ASSESSMENT — PAIN DESCRIPTION - LOCATION
LOCATION: ABDOMEN

## 2025-08-22 ASSESSMENT — PAIN DESCRIPTION - ORIENTATION
ORIENTATION: MID;LOWER
ORIENTATION: LOWER

## 2025-08-22 ASSESSMENT — ACTIVITIES OF DAILY LIVING (ADL): LACK_OF_TRANSPORTATION: NO

## 2025-08-25 ENCOUNTER — TELEPHONE (OUTPATIENT)
Dept: SURGERY | Facility: HOSPITAL | Age: 60
End: 2025-08-25
Payer: COMMERCIAL

## 2025-08-26 ENCOUNTER — TELEPHONE (OUTPATIENT)
Dept: SURGERY | Facility: CLINIC | Age: 60
End: 2025-08-26
Payer: COMMERCIAL

## 2025-08-27 ENCOUNTER — OFFICE VISIT (OUTPATIENT)
Dept: SURGERY | Facility: CLINIC | Age: 60
End: 2025-08-27
Payer: COMMERCIAL

## 2025-08-27 VITALS
WEIGHT: 161 LBS | HEART RATE: 66 BPM | BODY MASS INDEX: 30.42 KG/M2 | SYSTOLIC BLOOD PRESSURE: 114 MMHG | DIASTOLIC BLOOD PRESSURE: 76 MMHG

## 2025-08-27 DIAGNOSIS — K44.9 HIATAL HERNIA: Primary | ICD-10-CM

## 2025-08-27 DIAGNOSIS — R13.19 ESOPHAGEAL DYSPHAGIA: ICD-10-CM

## 2025-08-27 DIAGNOSIS — K21.9 PARAESOPHAGEAL HERNIA WITH GASTROESOPHAGEAL REFLUX: ICD-10-CM

## 2025-08-27 DIAGNOSIS — K44.9 PARAESOPHAGEAL HERNIA WITH GASTROESOPHAGEAL REFLUX: ICD-10-CM

## 2025-08-27 PROCEDURE — 99211 OFF/OP EST MAY X REQ PHY/QHP: CPT | Performed by: SURGERY

## 2025-08-27 ASSESSMENT — PAIN SCALES - GENERAL: PAINLEVEL_OUTOF10: 6

## 2025-08-29 ENCOUNTER — OFFICE VISIT (OUTPATIENT)
Dept: SURGICAL ONCOLOGY | Facility: CLINIC | Age: 60
End: 2025-08-29
Payer: COMMERCIAL

## 2025-08-29 VITALS
WEIGHT: 159.2 LBS | BODY MASS INDEX: 30.08 KG/M2 | OXYGEN SATURATION: 98 % | SYSTOLIC BLOOD PRESSURE: 102 MMHG | TEMPERATURE: 97.4 F | HEART RATE: 70 BPM | DIASTOLIC BLOOD PRESSURE: 67 MMHG

## 2025-08-29 DIAGNOSIS — D05.11 DUCTAL CARCINOMA IN SITU (DCIS) OF RIGHT BREAST: Primary | ICD-10-CM

## 2025-08-29 PROCEDURE — 1036F TOBACCO NON-USER: CPT | Performed by: SURGERY

## 2025-08-29 PROCEDURE — 99211 OFF/OP EST MAY X REQ PHY/QHP: CPT | Performed by: SURGERY

## 2025-08-29 ASSESSMENT — PAIN SCALES - GENERAL: PAINLEVEL_OUTOF10: 4

## 2025-09-04 ENCOUNTER — RESULTS FOLLOW-UP (OUTPATIENT)
Dept: SURGICAL ONCOLOGY | Facility: CLINIC | Age: 60
End: 2025-09-04
Payer: COMMERCIAL

## 2025-09-04 DIAGNOSIS — D05.11 DUCTAL CARCINOMA IN SITU (DCIS) OF RIGHT BREAST: ICD-10-CM

## 2025-09-10 ENCOUNTER — APPOINTMENT (OUTPATIENT)
Dept: SURGERY | Facility: CLINIC | Age: 60
End: 2025-09-10
Payer: COMMERCIAL

## 2025-09-30 ENCOUNTER — APPOINTMENT (OUTPATIENT)
Dept: ALLERGY | Facility: CLINIC | Age: 60
End: 2025-09-30
Payer: COMMERCIAL

## 2026-01-08 ENCOUNTER — APPOINTMENT (OUTPATIENT)
Dept: OTOLARYNGOLOGY | Facility: CLINIC | Age: 61
End: 2026-01-08
Payer: COMMERCIAL

## 2026-01-22 ENCOUNTER — APPOINTMENT (OUTPATIENT)
Dept: PRIMARY CARE | Facility: CLINIC | Age: 61
End: 2026-01-22
Payer: COMMERCIAL

## (undated) DEVICE — PAD, SANITARY, OBSTETRICAL, W/ADHSV STRIP,11 IN,LF

## (undated) DEVICE — SUTURE, CTD, VICRYL 3-0, UND, BR, SH-1

## (undated) DEVICE — KIT, MINOR, DOUBLE BASIN

## (undated) DEVICE — SUTURE, VICRYL, 2-0, 27 IN, BR/SH 27, VIOLET

## (undated) DEVICE — POSITIONING, THE PINK PAD, PIGAZZI SYSTEM

## (undated) DEVICE — SUTURE, SILK, 2-0, 30 IN, SH, BLACK

## (undated) DEVICE — STAY SET, SURGICAL , 5MM SHARP HOOK, CS/ 50

## (undated) DEVICE — DRAPE PACK, LAVH, W/ATTACHED LEGGINGS, W/POUCH, 100 X 114 IN, LF, STERILE

## (undated) DEVICE — SUTURE, PDS, 0, 18 IN, LIGATING LOOP, VIOLET

## (undated) DEVICE — SUTURE, VICRYL, 4-0, 18 IN, PS2, UNDYED

## (undated) DEVICE — LIGASURE, L-HOOK 37CM SEALER/DIVIDER LAP, MARYLAND

## (undated) DEVICE — GOWN, SURGICAL, IMPLT, BACK, DISPOSABLE, XLARGE, STERILE

## (undated) DEVICE — CLEAN KIT, ANTIFOG SCOPE, SEE SHARP 150MM

## (undated) DEVICE — SYRINGE, 12 CC, SAFETY, MONOJECT, LF

## (undated) DEVICE — SOLUTION, IRRIGATION, SODIUM CHLORIDE 0.9%, 1000 ML, POUR BOTTLE

## (undated) DEVICE — DRAPE, SHEET, FAN FOLDED, HALF, 44 X 58 IN, DISPOSABLE, LF, STERILE

## (undated) DEVICE — PREP TRAY, VAGINAL

## (undated) DEVICE — DRAPE, INSTRUMENT, W/POUCH, STERI DRAPE, 7 X 11 IN, DISPOSABLE, STERILE

## (undated) DEVICE — ADHESIVE, SKIN, DERMABOND ADVANCED, 15CM, PEN-STYLE

## (undated) DEVICE — TRAY, FOLEY, LUBRI-SIL, 16FR, COMPLETE CARE W/STATLOCK

## (undated) DEVICE — CATHETER, URETERAL, OPEN END, 5 FR, 70 CM

## (undated) DEVICE — ASSEMBLY, STRYKER FLOW 2, SUCTION IRRIGATOR, WITH TIP

## (undated) DEVICE — Device

## (undated) DEVICE — STRIP, SKIN CLOSURE, STERI STRIP, REINFORCED, 0.5 X 4 IN

## (undated) DEVICE — IRRIGATION SET, CYSTOSCOPY, REGULATING CLAMP, STRAIGHT, 81 IN

## (undated) DEVICE — SOLUTION, IV 0.9% NACL INJECTION USP 1000ML EXCEL PLUS, BAG

## (undated) DEVICE — ACCESS SYS, KII SHIELDED BLADED, Z-THREAD, 12X100CM

## (undated) DEVICE — DRESSING, ADHESIVE, ISLAND, TELFA, 2 X 3.75 IN, LF

## (undated) DEVICE — PLEDGET, TEFLON, CARDIAC, 5/16 X 5/16 IN, 7.9X7.9MM

## (undated) DEVICE — SUTURE, VICRYL PLUS, 0, 54IN, VIOLET, BRAIDED

## (undated) DEVICE — TUBE SET, PNEUMOCLEAR, SMOKE EVACU, HIGH-FLOW

## (undated) DEVICE — GLOVE, SURGEON, PREMIERPRO PI, UNDERGLV, SZ-7.0, PF, GRN

## (undated) DEVICE — SYRINGE, 20 CC, LUER LOCK, MONOJECT, W/O CAP, LF

## (undated) DEVICE — CAUTERY, PENCIL, PUSH BUTTON, SMOKE EVAC, 70MM

## (undated) DEVICE — ADHESIVE, SKIN, LIQUIBAND EXCEED

## (undated) DEVICE — SUTURE, SILK, 0, 30 IN, BR, BLACK

## (undated) DEVICE — SUTURE, VICRYL, 2-0, 27 IN, SH, UNDYED

## (undated) DEVICE — RETRACTOR, SURGICAL, RING, PLASTIC, DISPOSABLE

## (undated) DEVICE — SCISSOR, MINI ENDO CUT, TIPS, DISP

## (undated) DEVICE — GOWN, SURGICAL, SMARTGOWN, LARGE, STERILE

## (undated) DEVICE — TOWEL PACK, STERILE, 4/PACK, BLUE